# Patient Record
Sex: FEMALE | Race: WHITE | Employment: UNEMPLOYED | URBAN - METROPOLITAN AREA
[De-identification: names, ages, dates, MRNs, and addresses within clinical notes are randomized per-mention and may not be internally consistent; named-entity substitution may affect disease eponyms.]

---

## 2017-02-08 PROBLEM — R00.2 PALPITATIONS: Status: ACTIVE | Noted: 2017-02-08

## 2017-03-21 ENCOUNTER — HOSPITAL ENCOUNTER (OUTPATIENT)
Dept: MAMMOGRAPHY | Age: 52
Discharge: HOME | End: 2017-03-21
Attending: FAMILY MEDICINE
Payer: COMMERCIAL

## 2017-03-21 DIAGNOSIS — Z12.31 VISIT FOR SCREENING MAMMOGRAM: ICD-10-CM

## 2017-03-21 PROCEDURE — 77067 SCR MAMMO BI INCL CAD: CPT

## 2017-08-08 ENCOUNTER — HOSPITAL ENCOUNTER (OUTPATIENT)
Dept: PHYSICAL THERAPY | Age: 52
Discharge: HOME | End: 2017-08-08
Payer: COMMERCIAL

## 2017-08-08 PROCEDURE — 97162 PT EVAL MOD COMPLEX 30 MIN: CPT

## 2017-08-08 NOTE — PROGRESS NOTES
Arvind Ramírez  : [facility] [address]  [phone]   [phone]        OUTPATIENT PHYSICAL THERAPY:Initial Assessment and Daily Note 2017    ICD-10: Treatment Diagnosis: low back pain M54.5  ICD-10: Treatment Diagnosis: muscle spasm of back M62.830    Precautions/Allergies:   Penicillin; Aciphex [rabeprazole]; and Augmentin [amoxicillin-pot clavulanate]   Fall Risk Score: 1 (? 5 = High Risk)  MD Orders: self referred MEDICAL/REFERRING DIAGNOSIS:  Low back pain [M54.5]   DATE OF ONSET: 2017 flare up  REFERRING PHYSICIAN: Referred, Self, MD  RETURN PHYSICIAN APPOINTMENT: as needed     INITIAL ASSESSMENT:  Ms. Tal Brunner is a 49 y.o. female who presents to physical therapy with chronic low back pain with a flare up in 2017. She demonstrates increased soft tissue restrictions along her right side of thoracic and lumbar spine and demonstrates limited ROM through thoracic and lumbar spine flexion, extension and rotation. She demonstrates arthrokinematic dysfunctions throughout her lumbar spine and pelvis, soft tissue restrictions through anterior pelvis/hip right greater than left, decreased activation of core stability and weakness through hips and pelvis. She would benefit from skilled physical therapy to improve overall mobility and function. PROBLEM LIST (Impacting functional limitations):  1. Decreased Strength  2. Decreased ADL/Functional Activities  3. Decreased Ambulation Ability/Technique  4. Increased Pain  5. Decreased Activity Tolerance  6. Decreased Flexibility/Joint Mobility INTERVENTIONS PLANNED:  1. Home Exercise Program (HEP)  2. Manual Therapy  3. Neuromuscular Re-education/Strengthening  4. Range of Motion (ROM)  5. Therapeutic Exercise/Strengthening   TREATMENT PLAN:  Effective Dates: 17 TO 17.   Frequency/Duration: 1 time a week for 4 weeks (patient out of town for 2 weeks)  GOALS: (Goals have been discussed and agreed upon with patient.)    Discharge Goals: Time Frame: 4 weeks  1. Patient demonstrates independence with her HEP without verbal cueing from therapist.  2. Patient able to ambulate for 30 minutes without onset of low back pain. 3. Patient able to perform sit to stand transfers from various surfaces without onset of low back pain. 4. Improve Oswestry outcome measure score from 29/50 to 10/50 to perform ADLs  Rehabilitation Potential For Stated Goals: Excellent  Regarding Richi Haywood Malika's therapy, I certify that the treatment plan above will be carried out by a therapist or under their direction. Thank you for this referral,  Dillon Pittman, MARYAM     Referring Physician Signature: Referred, Self, MD              Date                    The information in this section was collected on 8/8/17 (except where otherwise noted). HISTORY:   History of Present Injury/Illness (Reason for Referral):  Chronic low back pain for years and she has been doing really well up until June 2017. Prior to the flare up she was exercising regularly, cycling and having little to no discomfort. Patient notes increased pain and stiffness with sit to stand transfers. She is challenged with bending and lifting, pain with walking and sitting. Past Medical History/Comorbidities: . Ms. Meng Elias  has a past medical history of Allergic rhinitis, cause unspecified; Esophageal reflux; Essential hypertension, benign; Extrinsic asthma, unspecified; Generalized anxiety disorder; Hypothyroidism; and Unspecified sleep apnea. Ms. Meng Elias  has a past surgical history that includes cholecystectomy (1/07); back surgery; and gyn (12/10). Social History/Living Environment:     Lives in a private home with her , no physical barriers present in home setting  Prior Level of Function/Work/Activity:   Independent, currently challenged with sitting prolonged at work and unable to perform normal workout routine secondary to pain.   Dominant Side:         RIGHT    Current Medications:       Current Outpatient Prescriptions:     lisinopril (PRINIVIL, ZESTRIL) 10 mg tablet, TAKE 1 TABLET BY MOUTH DAILY, Disp: 90 Tab, Rfl: 1    fluticasone-vilanterol (BREO ELLIPTA) 200-25 mcg/dose inhaler, Take 1 Puff by inhalation daily. , Disp: 1 Inhaler, Rfl: 5    montelukast (SINGULAIR) 10 mg tablet, TAKE ONE TABLET BY MOUTH EVERY DAY, Disp: 90 Tab, Rfl: 1    olopatadine (PATANOL) 0.1 % ophthalmic solution, Administer 2 Drops to both eyes two (2) times a day., Disp: 5 mL, Rfl: 5    B.infantis-B.ani-B.long-B.bifi (PROBIOTIC 4X) 10-15 mg TbEC, Take  by mouth., Disp: , Rfl:     omeprazole (PRILOSEC) 40 mg capsule, TAKE ONE CAPSULE BY MOUTH EVERY DAY, Disp: 30 Cap, Rfl: 5    PROAIR RESPICLICK 90 mcg/actuation aepb, INHALE ONE PUFF BY MOUTH EVERY 4 HOURS AS NEEDED, Disp: , Rfl: 5    fexofenadine (ALLEGRA) 180 mg tablet, Take  by mouth., Disp: , Rfl:     fluticasone (FLONASE) 50 mcg/actuation nasal spray, take 1 spray(s) intranasally once a day for 30 day(s), Disp: 1 Bottle, Rfl: 11    magnesium carbonate 54 mg/5 mL liqd, Take  by mouth., Disp: , Rfl:     albuterol (PROVENTIL HFA, VENTOLIN HFA, PROAIR HFA) 90 mcg/actuation inhaler, Take 2 Puffs by inhalation every four (4) hours as needed for Wheezing., Disp: 1 Inhaler, Rfl: 11   Date Last Reviewed:  8/8/2017   Number of Personal Factors/Comorbidities that affect the Plan of Care: 0: LOW COMPLEXITY   EXAMINATION:   Observation/Orthostatic Postural Assessment:          Patient denies any LE pain, paresthesia or symptoms. Patient denies any increase of symptoms with cough, sneeze or valsalva. Patient denies any saddle paresthesia or bowel/bladder deficits. Patient exhibits a decreased lumbar lordosis and slight increased thoracic kyphosis with convex right type I curve thoracic/lumbar spine. Lower extremity weight bearing is symmetrical. Shoulder symmetry exhibits bilateral protraction.  Observation of gait indicates decreased pelvic mobility (patient is a hip walker versus an efficient trunk walker). Lower quadrant bony landmarks are right iliac crest elevated compared to left, level greater trochanter, left greater trochanter anterior compared to right. Leg swing for innominate mobility indicates decreased bilaterally innominate extension. Vertical compression test (VCT) for alignment is 2. Elbow flexion test (EFT) for motor activation is 2. Soft tissue observation indicates restrictions in right iliopsoas, QL, thoracic and lumbar spine paraspinals, and piriformis bilaterally. Palpation: Myofascial assessment indicates restrictions through thoracolumbar fascia and around sacrococcygeal junction. Muscle length testing in modified Victor Manuel position exhibits restricted right psoas and rectus femoris. Hamstring flexibility tested supine with straight leg raise (SLR) is WFL. Piriformis length is restricted right greater than left. Quadriceps flexibility tested prone with heel to buttock is restricted right. Beather Richards test is positive right for rectus femoris tightness. Gastrocnemius and soleus flexibility are WFL. Sacrotuberous ligament is restricted right. Sacrococcygeal junction exhibits right rotated. Body of coccyx is flexed. ROM: Passive trunk rotation is 70% available to the R and 70% available to the L. Kinetic testing in standing including forward bend test is positive left. Marcher's test is positive right. Pelvic shear test is standing exhibits decreased excursion of bilateral shear with a hard end feel. Single plane active movements through lumbar spine in standing exhibit decreased lumbar flexion, extension hinge at L3 and restrictions in right side bending. Lumbar positional testing at transverse processes indicates FRS left L4-5 with limitations in extension, rotation and side bending right. Prone knee active flexion test is positive right.  Spring testing of lumbar spine exhibits decreased a/p mobilization at L4-5 and sacral base. AROM (PROM) Right Left   Hip flexion/Innominate flexion 90 90   Hip extension/Innominate extension 5 5   Hip external rotation (ER) 20 20   Hip internal rotation (IR) 15 20   Strength: Lumbar protective mechanism (LPM) are not tested today for initiation. LPM Strength */5   R anterior to posterior diagonal    L anterior to posterior diagonal    R posterior to anterior diagonal    L posterior to anterior diagonal      Manual Muscle Test (*/5) Right Left   Knee extension 5 5   Knee flexion 5 5   Hip flexion 4 4   Hip ER 4 4   Hip IR 4 4   Hip extension 4- 4-   Hip abduction 3+ 3+   Hip adduction 5 5   Ankle DF 5 5   Ankle PF 5 5   Core stability 3+/5     Special Tests:  Odessia Bang test is negative. Scours test is negative  Neurological Screen:  Myotomes: Key muscle strength testing through bilateral LE is intact. Dermatomes: Sensation testing through bilateral lower quadrants for light touch is intact. Reflexes: Patellar (L4) and Achilles (S1) are not tested today. Neural tension tests: Passive straight leg raise (SLR) test is negative. Slump test is negative. .  Functional Mobility:  Challenged with sit to stand transfers, ambulation and rotation movements through thoracic and lumbar spine. Body Structures Involved:  1. Nerves  2. Joints  3. Muscles Body Functions Affected:  1. Sensory/Pain  2. Neuromusculoskeletal  3. Movement Related Activities and Participation Affected:  1. General Tasks and Demands  2. Mobility  3. Community, Social and [place]   Number of elements (examined above) that affect the Plan of Care: 3: MODERATE COMPLEXITY   CLINICAL PRESENTATION:   Presentation: Evolving clinical presentation with changing clinical characteristics: MODERATE COMPLEXITY   CLINICAL DECISION MAKING:   Outcome Measure:    Tool Used: Modified Oswestry Low Back Pain Questionnaire  Score:  Initial: 29/50  Most Recent: X/50 (Date: -- )   Interpretation of Score: Each section is scored on a 0-5 scale, 5 representing the greatest disability. The scores of each section are added together for a total score of 50. Medical Necessity:   · Patient is expected to demonstrate progress in strength, range of motion, balance and coordination to increase independence with ADLs and ambulation without discomfort. .  Reason for Services/Other Comments:  · Patient continues to require skilled intervention due to increased pain with activity, challenged with daily tasks secondary to discomfort and limited mobility in lumbar spine and pelvis. .   Use of outcome tool(s) and clinical judgement create a POC that gives a: Questionable prediction of patient's progress: MODERATE COMPLEXITY            TREATMENT:   (In addition to Assessment/Re-Assessment sessions the following treatments were rendered)  Pre-treatment Symptoms/Complaints:  Patient notes increased pain over the past two months, is currently being seen by chiropractor. Patient will be out of town for 2 weeks. Pain: Initial: Pain Intensity 1: 8  Pain Location 1: Spine, lumbar  Pain Orientation 1: Right  Post Session:  5/10     Therapeutic Exercise: (5 Minutes):  Exercises per grid below to improve mobility, strength, balance and coordination. Required minimal verbal and manual cues to promote proper body alignment, promote proper body posture and promote proper body mechanics. Progressed resistance, range, repetitions and complexity of movement as indicated. Date:  8/8/2017   Activity/Exercise Parameters   Child pose X 5 reps, 10 sec holds   Abdominal bracing X 10, 5 sec holds                         Manual Therapy (    Soft Tissue Mobilization Duration  Duration: 5 Minutes): Manual techniques to facilitate improved motion and decreased pain.   · Supine anterior right iliopsoas release with FMP of lower trunk rotation  · Prone hip in axis right hip, with FMP and NMR into hip ER/IR  (Used abbreviations: MET - muscle energy technique; PNF - proprioceptive neuromuscular facilitation; NMR - neuromuscular re-education; a/p - anterior to posterior; p/a - posterior to anterior, FMP - functional movement patterns)    Treatment/Session Assessment:    · Response to Treatment:  Improved mobility noted at end of session decreased soft tissue restrictions in right anterior pelvis. .  · Compliance with Program/Exercises: compliant all of the time. · Recommendations/Intent for next treatment session: \"Next visit will focus on advancements to more challenging activities\".   Total Treatment Duration:  PT Patient Time In/Time Out  Time In: 0930  Time Out: [address] [place] Schaumann, PT

## 2017-08-08 NOTE — PROGRESS NOTES
Ambulatory/Rehab Services H2 Model Falls Risk Assessment    Risk Factor Pts. ·   Confusion/Disorientation/Impulsivity  []    4 ·   Symptomatic Depression  []   2 ·   Altered Elimination  []   1 ·   Dizziness/Vertigo  []   1 ·   Gender (Male)  []   1 ·   Any administered antiepileptics (anticonvulsants):  []   2 ·   Any administered benzodiazepines:  []   1 ·   Visual Impairment (specify):  []   1 ·   Portable Oxygen Use  []   1 ·   Orthostatic ? BP  []   1 ·   History of Recent Falls (within 3 mos.)  []   5     Ability to Rise from Chair (choose one) Pts. ·   Ability to rise in a single movement  []   0 ·   Pushes up, successful in one attempt  [x]   1 ·   Multiple attempts, but successful  []   3 ·   Unable to rise without assistance  []   4   Total: (5 or greater = High Risk) 1     Falls Prevention Plan:   []                Physical Limitations to Exercise (specify):   []                Mobility Assistance Device (type):   []                Exercise/Equipment Adaptation (specify):    ©2010 [facility] [address] Patent #[id number].  Federal Law prohibits the replication, distribution or use without written permission from [facility] [employer]

## 2017-08-22 ENCOUNTER — HOSPITAL ENCOUNTER (OUTPATIENT)
Dept: PHYSICAL THERAPY | Age: 52
Discharge: HOME | End: 2017-08-22
Payer: COMMERCIAL

## 2017-08-22 PROCEDURE — 97140 MANUAL THERAPY 1/> REGIONS: CPT

## 2017-08-22 PROCEDURE — 97110 THERAPEUTIC EXERCISES: CPT

## 2017-08-22 NOTE — PROGRESS NOTES
James Marc  : [facility] [address]  [phone]   [phone]        OUTPATIENT PHYSICAL THERAPY:Daily Note 2017    ICD-10: Treatment Diagnosis: low back pain M54.5  ICD-10: Treatment Diagnosis: muscle spasm of back M62.830    Precautions/Allergies:   Penicillin; Aciphex [rabeprazole]; and Augmentin [amoxicillin-pot clavulanate]   Fall Risk Score: 1 (? 5 = High Risk)  MD Orders: self referred MEDICAL/REFERRING DIAGNOSIS:  Low back pain [M54.5]   DATE OF ONSET: 2017 flare up  REFERRING PHYSICIAN: Referred, Self, MD  RETURN PHYSICIAN APPOINTMENT: as needed     INITIAL ASSESSMENT:  Ms. Nasario Hamman is a 49 y.o. female who presents to physical therapy with chronic low back pain with a flare up in 2017. She demonstrates increased soft tissue restrictions along her right side of thoracic and lumbar spine and demonstrates limited ROM through thoracic and lumbar spine flexion, extension and rotation. She demonstrates arthrokinematic dysfunctions throughout her lumbar spine and pelvis, soft tissue restrictions through anterior pelvis/hip right greater than left, decreased activation of core stability and weakness through hips and pelvis. She would benefit from skilled physical therapy to improve overall mobility and function. PROBLEM LIST (Impacting functional limitations):  1. Decreased Strength  2. Decreased ADL/Functional Activities  3. Decreased Ambulation Ability/Technique  4. Increased Pain  5. Decreased Activity Tolerance  6. Decreased Flexibility/Joint Mobility INTERVENTIONS PLANNED:  1. Home Exercise Program (HEP)  2. Manual Therapy  3. Neuromuscular Re-education/Strengthening  4. Range of Motion (ROM)  5. Therapeutic Exercise/Strengthening   TREATMENT PLAN:  Effective Dates: 17 TO 17.   Frequency/Duration: 1 time a week for 4 weeks (patient out of town for 2 weeks)  GOALS: (Goals have been discussed and agreed upon with patient.)    Discharge Goals: Time Frame: 4 weeks  1. Patient demonstrates independence with her HEP without verbal cueing from therapist.  2. Patient able to ambulate for 30 minutes without onset of low back pain. 3. Patient able to perform sit to stand transfers from various surfaces without onset of low back pain. 4. Improve Oswestry outcome measure score from 29/50 to 10/50 to perform ADLs  Rehabilitation Potential For Stated Goals: Excellent  Regarding Bindu Daly's therapy, I certify that the treatment plan above will be carried out by a therapist or under their direction. Thank you for this referral,  Flor Daly PT     Referring Physician Signature: Referred, Self, MD              Date                    The information in this section was collected on 8/8/17 (except where otherwise noted). HISTORY:   History of Present Injury/Illness (Reason for Referral):  Chronic low back pain for years and she has been doing really well up until June 2017. Prior to the flare up she was exercising regularly, cycling and having little to no discomfort. Patient notes increased pain and stiffness with sit to stand transfers. She is challenged with bending and lifting, pain with walking and sitting. Past Medical History/Comorbidities: . Ms. Tyler Portillo  has a past medical history of Allergic rhinitis, cause unspecified; Esophageal reflux; Essential hypertension, benign; Extrinsic asthma, unspecified; Generalized anxiety disorder; Hypothyroidism; and Unspecified sleep apnea. Ms. Tyler Portillo  has a past surgical history that includes cholecystectomy (1/07); back surgery; and gyn (12/10). Social History/Living Environment:     Lives in a private home with her , no physical barriers present in home setting  Prior Level of Function/Work/Activity:   Independent, currently challenged with sitting prolonged at work and unable to perform normal workout routine secondary to pain.   Dominant Side: RIGHT    Current Medications:       Current Outpatient Prescriptions:     lisinopril (PRINIVIL, ZESTRIL) 10 mg tablet, TAKE 1 TABLET BY MOUTH DAILY, Disp: 90 Tab, Rfl: 1    fluticasone-vilanterol (BREO ELLIPTA) 200-25 mcg/dose inhaler, Take 1 Puff by inhalation daily. , Disp: 1 Inhaler, Rfl: 5    montelukast (SINGULAIR) 10 mg tablet, TAKE ONE TABLET BY MOUTH EVERY DAY, Disp: 90 Tab, Rfl: 1    olopatadine (PATANOL) 0.1 % ophthalmic solution, Administer 2 Drops to both eyes two (2) times a day., Disp: 5 mL, Rfl: 5    B.infantis-B.ani-B.long-B.bifi (PROBIOTIC 4X) 10-15 mg TbEC, Take  by mouth., Disp: , Rfl:     omeprazole (PRILOSEC) 40 mg capsule, TAKE ONE CAPSULE BY MOUTH EVERY DAY, Disp: 30 Cap, Rfl: 5    PROAIR RESPICLICK 90 mcg/actuation aepb, INHALE ONE PUFF BY MOUTH EVERY 4 HOURS AS NEEDED, Disp: , Rfl: 5    fexofenadine (ALLEGRA) 180 mg tablet, Take  by mouth., Disp: , Rfl:     fluticasone (FLONASE) 50 mcg/actuation nasal spray, take 1 spray(s) intranasally once a day for 30 day(s), Disp: 1 Bottle, Rfl: 11    magnesium carbonate 54 mg/5 mL liqd, Take  by mouth., Disp: , Rfl:     albuterol (PROVENTIL HFA, VENTOLIN HFA, PROAIR HFA) 90 mcg/actuation inhaler, Take 2 Puffs by inhalation every four (4) hours as needed for Wheezing., Disp: 1 Inhaler, Rfl: 11   Date Last Reviewed:  8/22/2017   Number of Personal Factors/Comorbidities that affect the Plan of Care: 0: LOW COMPLEXITY   EXAMINATION:   Observation/Orthostatic Postural Assessment:          Patient denies any LE pain, paresthesia or symptoms. Patient denies any increase of symptoms with cough, sneeze or valsalva. Patient denies any saddle paresthesia or bowel/bladder deficits. Patient exhibits a decreased lumbar lordosis and slight increased thoracic kyphosis with convex right type I curve thoracic/lumbar spine. Lower extremity weight bearing is symmetrical. Shoulder symmetry exhibits bilateral protraction.  Observation of gait indicates decreased pelvic mobility (patient is a hip walker versus an efficient trunk walker). Lower quadrant bony landmarks are right iliac crest elevated compared to left, level greater trochanter, left greater trochanter anterior compared to right. Leg swing for innominate mobility indicates decreased bilaterally innominate extension. Vertical compression test (VCT) for alignment is 2. Elbow flexion test (EFT) for motor activation is 2. Soft tissue observation indicates restrictions in right iliopsoas, QL, thoracic and lumbar spine paraspinals, and piriformis bilaterally. Palpation: Myofascial assessment indicates restrictions through thoracolumbar fascia and around sacrococcygeal junction. Muscle length testing in modified Victor Manuel position exhibits restricted right psoas and rectus femoris. Hamstring flexibility tested supine with straight leg raise (SLR) is WFL. Piriformis length is restricted right greater than left. Quadriceps flexibility tested prone with heel to buttock is restricted right. Julieanne Mariscal test is positive right for rectus femoris tightness. Gastrocnemius and soleus flexibility are WFL. Sacrotuberous ligament is restricted right. Sacrococcygeal junction exhibits right rotated. Body of coccyx is flexed. ROM: Passive trunk rotation is 70% available to the R and 70% available to the L. Kinetic testing in standing including forward bend test is positive left. Marcher's test is positive right. Pelvic shear test is standing exhibits decreased excursion of bilateral shear with a hard end feel. Single plane active movements through lumbar spine in standing exhibit decreased lumbar flexion, extension hinge at L3 and restrictions in right side bending. Lumbar positional testing at transverse processes indicates FRS left L4-5 with limitations in extension, rotation and side bending right. Prone knee active flexion test is positive right. Spring testing of lumbar spine exhibits decreased a/p mobilization at L4-5 and sacral base. AROM (PROM) Right Left   Hip flexion/Innominate flexion 90 90   Hip extension/Innominate extension 5 5   Hip external rotation (ER) 20 20   Hip internal rotation (IR) 15 20   Strength: Lumbar protective mechanism (LPM) are not tested today for initiation. LPM Strength */5   R anterior to posterior diagonal    L anterior to posterior diagonal    R posterior to anterior diagonal    L posterior to anterior diagonal      Manual Muscle Test (*/5) Right Left   Knee extension 5 5   Knee flexion 5 5   Hip flexion 4 4   Hip ER 4 4   Hip IR 4 4   Hip extension 4- 4-   Hip abduction 3+ 3+   Hip adduction 5 5   Ankle DF 5 5   Ankle PF 5 5   Core stability 3+/5     Special Tests:  Barros Miracle test is negative. Scours test is negative  Neurological Screen:  Myotomes: Key muscle strength testing through bilateral LE is intact. Dermatomes: Sensation testing through bilateral lower quadrants for light touch is intact. Reflexes: Patellar (L4) and Achilles (S1) are not tested today. Neural tension tests: Passive straight leg raise (SLR) test is negative. Slump test is negative. .  Functional Mobility:  Challenged with sit to stand transfers, ambulation and rotation movements through thoracic and lumbar spine. Body Structures Involved:  1. Nerves  2. Joints  3. Muscles Body Functions Affected:  1. Sensory/Pain  2. Neuromusculoskeletal  3. Movement Related Activities and Participation Affected:  1. General Tasks and Demands  2. Mobility  3. Community, Social and [place]   Number of elements (examined above) that affect the Plan of Care: 3: MODERATE COMPLEXITY   CLINICAL PRESENTATION:   Presentation: Evolving clinical presentation with changing clinical characteristics: MODERATE COMPLEXITY   CLINICAL DECISION MAKING:   Outcome Measure:    Tool Used: Modified Oswestry Low Back Pain Questionnaire  Score:  Initial: 29/50  Most Recent: X/50 (Date: -- )   Interpretation of Score: Each section is scored on a 0-5 scale, 5 representing the greatest disability. The scores of each section are added together for a total score of 50. Medical Necessity:   · Patient is expected to demonstrate progress in strength, range of motion, balance and coordination to increase independence with ADLs and ambulation without discomfort. .  Reason for Services/Other Comments:  · Patient continues to require skilled intervention due to increased pain with activity, challenged with daily tasks secondary to discomfort and limited mobility in lumbar spine and pelvis. .   Use of outcome tool(s) and clinical judgement create a POC that gives a: Questionable prediction of patient's progress: MODERATE COMPLEXITY            TREATMENT:   (In addition to Assessment/Re-Assessment sessions the following treatments were rendered)  Pre-treatment Symptoms/Complaints:  Patient notes had myofascial release earlier today and notes improved mobility into her lumbar spine and coccyx. Continues to have right anterior hip impingement with sitting   Pain: Initial: Pain Intensity 1: 5  Pain Location 1: Spine, lumbar  Pain Orientation 1: Right  Post Session:  3/10     Therapeutic Exercise: (15 Minutes):  Exercises per grid below to improve mobility, strength, balance and coordination. Required minimal verbal and manual cues to promote proper body alignment, promote proper body posture and promote proper body mechanics. Progressed resistance, range, repetitions and complexity of movement as indicated. Date:  8/22/2017   Activity/Exercise Parameters   Child pose X 5 reps, 10 sec holds   Abdominal bracing X 10, 5 sec holds   Postural education X 10 minute review of sitting positions in neutral pelvis, hip hinge, weight bearing through LE, awareness of extension through thoracic spine and sternal elevation with her normal posture.    Quadriped arch/sag X 5 reps, 10 sec holds                 Manual Therapy (    Soft Tissue Mobilization Duration  Duration: 45 Minutes): Manual techniques to facilitate improved motion and decreased pain. · Supine anterior right iliopsoas release with FMP of lower trunk rotation  · Supine right cecum soft tissue mobilization   · Prone hip in axis right hip, with FMP and NMR into hip ER/IR  · Prone soft tissue mobilization around bony contours of iliac crest and sacral sulcus  · Side lying left for right QL soft tissue mobilization with pelvic PNF patterns of anterior elevation and posterior depression. (Used abbreviations: MET - muscle energy technique; PNF - proprioceptive neuromuscular facilitation; NMR - neuromuscular re-education; a/p - anterior to posterior; p/a - posterior to anterior, FMP - functional movement patterns)    Treatment/Session Assessment:    · Response to Treatment:  Decreased soft tissue restrictions noted in pelvis and right anterior hip at end of session. · Compliance with Program/Exercises: compliant all of the time. · Recommendations/Intent for next treatment session: \"Next visit will focus on advancements to more challenging activities\".   Total Treatment Duration:  PT Patient Time In/Time Out  Time In: 1330  Time Out: [address] [place] [address] Patra Sandhoff, PT

## 2017-08-30 ENCOUNTER — HOSPITAL ENCOUNTER (OUTPATIENT)
Dept: PHYSICAL THERAPY | Age: 52
Discharge: HOME | End: 2017-08-30
Payer: COMMERCIAL

## 2017-08-30 PROCEDURE — 97140 MANUAL THERAPY 1/> REGIONS: CPT

## 2017-08-30 PROCEDURE — 97110 THERAPEUTIC EXERCISES: CPT

## 2017-08-30 NOTE — PROGRESS NOTES
Vito Frances  : [facility] [address]  [phone]   [phone]        OUTPATIENT PHYSICAL THERAPY:Daily Note 2017    ICD-10: Treatment Diagnosis: low back pain M54.5  ICD-10: Treatment Diagnosis: muscle spasm of back M62.830    Precautions/Allergies:   Penicillin; Aciphex [rabeprazole]; and Augmentin [amoxicillin-pot clavulanate]   Fall Risk Score: 1 (? 5 = High Risk)  MD Orders: self referred MEDICAL/REFERRING DIAGNOSIS:  Low back pain [M54.5]   DATE OF ONSET: 2017 flare up  REFERRING PHYSICIAN: Referred, Self, MD  RETURN PHYSICIAN APPOINTMENT: as needed     INITIAL ASSESSMENT:  Ms. Moise Armas is a 47 y.o. female who presents to physical therapy with chronic low back pain with a flare up in 2017. She demonstrates increased soft tissue restrictions along her right side of thoracic and lumbar spine and demonstrates limited ROM through thoracic and lumbar spine flexion, extension and rotation. She demonstrates arthrokinematic dysfunctions throughout her lumbar spine and pelvis, soft tissue restrictions through anterior pelvis/hip right greater than left, decreased activation of core stability and weakness through hips and pelvis. She would benefit from skilled physical therapy to improve overall mobility and function. PROBLEM LIST (Impacting functional limitations):  1. Decreased Strength  2. Decreased ADL/Functional Activities  3. Decreased Ambulation Ability/Technique  4. Increased Pain  5. Decreased Activity Tolerance  6. Decreased Flexibility/Joint Mobility INTERVENTIONS PLANNED:  1. Home Exercise Program (HEP)  2. Manual Therapy  3. Neuromuscular Re-education/Strengthening  4. Range of Motion (ROM)  5. Therapeutic Exercise/Strengthening   TREATMENT PLAN:  Effective Dates: 17 TO 17.   Frequency/Duration: 1 time a week for 4 weeks (patient out of town for 2 weeks)  GOALS: (Goals have been discussed and agreed upon with patient.)    Discharge Goals: Time Frame: 4 weeks  1. Patient demonstrates independence with her HEP without verbal cueing from therapist.  2. Patient able to ambulate for 30 minutes without onset of low back pain. 3. Patient able to perform sit to stand transfers from various surfaces without onset of low back pain. 4. Improve Oswestry outcome measure score from 29/50 to 10/50 to perform ADLs  Rehabilitation Potential For Stated Goals: Excellent  Regarding Maria Ines Daly's therapy, I certify that the treatment plan above will be carried out by a therapist or under their direction. Thank you for this referral,  An Mars PT     Referring Physician Signature: Referred, Self, MD              Date                    The information in this section was collected on 8/8/17 (except where otherwise noted). HISTORY:   History of Present Injury/Illness (Reason for Referral):  Chronic low back pain for years and she has been doing really well up until June 2017. Prior to the flare up she was exercising regularly, cycling and having little to no discomfort. Patient notes increased pain and stiffness with sit to stand transfers. She is challenged with bending and lifting, pain with walking and sitting. Past Medical History/Comorbidities: . Ms. Vince Kulkarni  has a past medical history of Allergic rhinitis, cause unspecified; Esophageal reflux; Essential hypertension, benign; Extrinsic asthma, unspecified; Generalized anxiety disorder; Hypothyroidism; and Unspecified sleep apnea. Ms. Vince Kulkarni  has a past surgical history that includes cholecystectomy (1/07); back surgery; and gyn (12/10). Social History/Living Environment:     Lives in a private home with her , no physical barriers present in home setting  Prior Level of Function/Work/Activity:   Independent, currently challenged with sitting prolonged at work and unable to perform normal workout routine secondary to pain.   Dominant Side: RIGHT    Current Medications:       Current Outpatient Prescriptions:     lisinopril (PRINIVIL, ZESTRIL) 10 mg tablet, TAKE 1 TABLET BY MOUTH DAILY, Disp: 90 Tab, Rfl: 1    fluticasone-vilanterol (BREO ELLIPTA) 200-25 mcg/dose inhaler, Take 1 Puff by inhalation daily. , Disp: 1 Inhaler, Rfl: 5    montelukast (SINGULAIR) 10 mg tablet, TAKE ONE TABLET BY MOUTH EVERY DAY, Disp: 90 Tab, Rfl: 1    olopatadine (PATANOL) 0.1 % ophthalmic solution, Administer 2 Drops to both eyes two (2) times a day., Disp: 5 mL, Rfl: 5    B.infantis-B.ani-B.long-B.bifi (PROBIOTIC 4X) 10-15 mg TbEC, Take  by mouth., Disp: , Rfl:     omeprazole (PRILOSEC) 40 mg capsule, TAKE ONE CAPSULE BY MOUTH EVERY DAY, Disp: 30 Cap, Rfl: 5    PROAIR RESPICLICK 90 mcg/actuation aepb, INHALE ONE PUFF BY MOUTH EVERY 4 HOURS AS NEEDED, Disp: , Rfl: 5    fexofenadine (ALLEGRA) 180 mg tablet, Take  by mouth., Disp: , Rfl:     fluticasone (FLONASE) 50 mcg/actuation nasal spray, take 1 spray(s) intranasally once a day for 30 day(s), Disp: 1 Bottle, Rfl: 11    magnesium carbonate 54 mg/5 mL liqd, Take  by mouth., Disp: , Rfl:     albuterol (PROVENTIL HFA, VENTOLIN HFA, PROAIR HFA) 90 mcg/actuation inhaler, Take 2 Puffs by inhalation every four (4) hours as needed for Wheezing., Disp: 1 Inhaler, Rfl: 11   Date Last Reviewed:  8/30/2017   Number of Personal Factors/Comorbidities that affect the Plan of Care: 0: LOW COMPLEXITY   EXAMINATION:   Observation/Orthostatic Postural Assessment:          Patient denies any LE pain, paresthesia or symptoms. Patient denies any increase of symptoms with cough, sneeze or valsalva. Patient denies any saddle paresthesia or bowel/bladder deficits. Patient exhibits a decreased lumbar lordosis and slight increased thoracic kyphosis with convex right type I curve thoracic/lumbar spine. Lower extremity weight bearing is symmetrical. Shoulder symmetry exhibits bilateral protraction.  Observation of gait indicates decreased pelvic mobility (patient is a hip walker versus an efficient trunk walker). Lower quadrant bony landmarks are right iliac crest elevated compared to left, level greater trochanter, left greater trochanter anterior compared to right. Leg swing for innominate mobility indicates decreased bilaterally innominate extension. Vertical compression test (VCT) for alignment is 2. Elbow flexion test (EFT) for motor activation is 2. Soft tissue observation indicates restrictions in right iliopsoas, QL, thoracic and lumbar spine paraspinals, and piriformis bilaterally. Palpation: Myofascial assessment indicates restrictions through thoracolumbar fascia and around sacrococcygeal junction. Muscle length testing in modified Victor Manuel position exhibits restricted right psoas and rectus femoris. Hamstring flexibility tested supine with straight leg raise (SLR) is WFL. Piriformis length is restricted right greater than left. Quadriceps flexibility tested prone with heel to buttock is restricted right. Reina Garden test is positive right for rectus femoris tightness. Gastrocnemius and soleus flexibility are WFL. Sacrotuberous ligament is restricted right. Sacrococcygeal junction exhibits right rotated. Body of coccyx is flexed. ROM: Passive trunk rotation is 70% available to the R and 70% available to the L. Kinetic testing in standing including forward bend test is positive left. Marcher's test is positive right. Pelvic shear test is standing exhibits decreased excursion of bilateral shear with a hard end feel. Single plane active movements through lumbar spine in standing exhibit decreased lumbar flexion, extension hinge at L3 and restrictions in right side bending. Lumbar positional testing at transverse processes indicates FRS left L4-5 with limitations in extension, rotation and side bending right. Prone knee active flexion test is positive right. Spring testing of lumbar spine exhibits decreased a/p mobilization at L4-5 and sacral base. AROM (PROM) Right Left   Hip flexion/Innominate flexion 90 90   Hip extension/Innominate extension 5 5   Hip external rotation (ER) 20 20   Hip internal rotation (IR) 15 20   Strength: Lumbar protective mechanism (LPM) are not tested today for initiation. LPM Strength */5   R anterior to posterior diagonal    L anterior to posterior diagonal    R posterior to anterior diagonal    L posterior to anterior diagonal      Manual Muscle Test (*/5) Right Left   Knee extension 5 5   Knee flexion 5 5   Hip flexion 4 4   Hip ER 4 4   Hip IR 4 4   Hip extension 4- 4-   Hip abduction 3+ 3+   Hip adduction 5 5   Ankle DF 5 5   Ankle PF 5 5   Core stability 3+/5     Special Tests:  Francesco Josy test is negative. Scours test is negative  Neurological Screen:  Myotomes: Key muscle strength testing through bilateral LE is intact. Dermatomes: Sensation testing through bilateral lower quadrants for light touch is intact. Reflexes: Patellar (L4) and Achilles (S1) are not tested today. Neural tension tests: Passive straight leg raise (SLR) test is negative. Slump test is negative. .  Functional Mobility:  Challenged with sit to stand transfers, ambulation and rotation movements through thoracic and lumbar spine. Body Structures Involved:  1. Nerves  2. Joints  3. Muscles Body Functions Affected:  1. Sensory/Pain  2. Neuromusculoskeletal  3. Movement Related Activities and Participation Affected:  1. General Tasks and Demands  2. Mobility  3. Community, Social and [place]   Number of elements (examined above) that affect the Plan of Care: 3: MODERATE COMPLEXITY   CLINICAL PRESENTATION:   Presentation: Evolving clinical presentation with changing clinical characteristics: MODERATE COMPLEXITY   CLINICAL DECISION MAKING:   Outcome Measure:    Tool Used: Modified Oswestry Low Back Pain Questionnaire  Score:  Initial: 29/50  Most Recent: X/50 (Date: -- )   Interpretation of Score: Each section is scored on a 0-5 scale, 5 representing the greatest disability. The scores of each section are added together for a total score of 50. Medical Necessity:   · Patient is expected to demonstrate progress in strength, range of motion, balance and coordination to increase independence with ADLs and ambulation without discomfort. .  Reason for Services/Other Comments:  · Patient continues to require skilled intervention due to increased pain with activity, challenged with daily tasks secondary to discomfort and limited mobility in lumbar spine and pelvis. .   Use of outcome tool(s) and clinical judgement create a POC that gives a: Questionable prediction of patient's progress: MODERATE COMPLEXITY            TREATMENT:   (In addition to Assessment/Re-Assessment sessions the following treatments were rendered)  Pre-treatment Symptoms/Complaints:  Patient notes improved mobility after her [address] appointment yesterday, noting more awareness of her posture in sitting and standing. Pain: Initial: Pain Intensity 1: 4  Pain Location 1: Hip, Spine, lumbar  Pain Orientation 1: Right  Post Session:  3/10     Therapeutic Exercise: (25 Minutes):  Exercises per grid below to improve mobility, strength, balance and coordination. Required minimal verbal and manual cues to promote proper body alignment, promote proper body posture and promote proper body mechanics. Progressed resistance, range, repetitions and complexity of movement as indicated. Date:  8/30/2017   Activity/Exercise Parameters   Child pose X 5 reps, 10 sec holds   Abdominal bracing X 10, 5 sec holds  X 10 reps UE/LE, 5 sec holds with distraction   Postural education X 10 minute review of sitting positions in neutral pelvis, hip hinge, weight bearing through LE, awareness of extension through thoracic spine and sternal elevation with her normal posture.   Review of standing positions of thoracic block to pelvic block with recognition of her thoracic spine sitting in extension in her \"normal\" position. Quadriped arch/sag X 5 reps, 10 sec holds   Lower trunk rotation multifidi activation X 5 minute education of core stability and techniques  X 5 reps, slow and controlled bilaterally. Manual Therapy (    Soft Tissue Mobilization Duration  Duration: 35 Minutes): Manual techniques to facilitate improved motion and decreased pain. · Supine anterior right iliopsoas release with FMP of lower trunk rotation  · Supine right cecum soft tissue mobilization   · Prone hip in axis right hip, with FMP and NMR into hip ER/IR  · Prone left sacral mobilization p/a with FMP of left hip ER/IR followed with NMR  · Prone soft tissue mobilization around bony contours of iliac crest and sacral sulcus  · Side lying left for right QL soft tissue mobilization with pelvic PNF patterns of anterior elevation and posterior depression. (Used abbreviations: MET - muscle energy technique; PNF - proprioceptive neuromuscular facilitation; NMR - neuromuscular re-education; a/p - anterior to posterior; p/a - posterior to anterior, FMP - functional movement patterns)    Treatment/Session Assessment:    · Response to Treatment:  Improved overall mobility in lumbar spine and pelvic position at end of session, improving awareness of her overall postural positions. · Compliance with Program/Exercises: compliant all of the time. · Recommendations/Intent for next treatment session: \"Next visit will focus on advancements to more challenging activities\".   Total Treatment Duration:  PT Patient Time In/Time Out  Time In: 0935  Time Out: 1500 [facility] Ashley Castillo, MARYAM

## 2017-09-05 ENCOUNTER — HOSPITAL ENCOUNTER (OUTPATIENT)
Dept: PHYSICAL THERAPY | Age: 52
Discharge: HOME | End: 2017-09-05
Payer: COMMERCIAL

## 2017-09-05 PROCEDURE — 97110 THERAPEUTIC EXERCISES: CPT

## 2017-09-05 PROCEDURE — 97140 MANUAL THERAPY 1/> REGIONS: CPT

## 2017-09-05 NOTE — PROGRESS NOTES
Jorge Body  : [facility] [address]  [phone]   [phone]        OUTPATIENT PHYSICAL THERAPY:Daily Note and Recertification [id number]    ICD-10: Treatment Diagnosis: low back pain M54.5  ICD-10: Treatment Diagnosis: muscle spasm of back M62.830    Precautions/Allergies:   Penicillin; Aciphex [rabeprazole]; and Augmentin [amoxicillin-pot clavulanate]   Fall Risk Score: 1 (? 5 = High Risk)  MD Orders: self referred MEDICAL/REFERRING DIAGNOSIS:  Low back pain [M54.5]   DATE OF ONSET: 2017 flare up  REFERRING PHYSICIAN: Steffi Hernandez 89: as needed     INITIAL ASSESSMENT:  Ms. Everett Morris is a 47 y.o. female who presents to physical therapy with chronic low back pain with a flare up in 2017. She demonstrates increased soft tissue restrictions along her right side of thoracic and lumbar spine and demonstrates limited ROM through thoracic and lumbar spine flexion, extension and rotation. She demonstrates arthrokinematic dysfunctions throughout her lumbar spine and pelvis, soft tissue restrictions through anterior pelvis/hip right greater than left, decreased activation of core stability and weakness through hips and pelvis. She would benefit from skilled physical therapy to improve overall mobility and function. 17: Patient has attended 4 physical therapy sessions, she has demonstrated improved mobility through pelvis and lumbar spine, however, continues to demonstrate joint and soft tissue restrictions, strength deficits and postural deficits. She would benefit from continued physical therapy to improve overall mobility and function with daily activities. PROBLEM LIST (Impacting functional limitations):  1. Decreased Strength  2. Decreased ADL/Functional Activities  3. Decreased Ambulation Ability/Technique  4. Increased Pain  5. Decreased Activity Tolerance  6.  Decreased Flexibility/Joint Mobility INTERVENTIONS PLANNED:  1. Home Exercise Program (HEP)  2. Manual Therapy  3. Neuromuscular Re-education/Strengthening  4. Range of Motion (ROM)  5. Therapeutic Exercise/Strengthening   TREATMENT PLAN:  Effective Dates:  9/5/17 TO 11/28/17. Frequency/Duration: 2x a week for 6 weeks and progress to 1x a week for 6 weeks. (Patient may only be seen 1x a week secondary to therapist availability.)  GOALS: (Goals have been discussed and agreed upon with patient.)    Discharge Goals: Time Frame: 12 weeks  1. Patient demonstrates independence with her HEP without verbal cueing from therapist. - GOAL MET  2. Patient able to ambulate for 30 minutes without onset of low back pain. - ONGOING  3. Patient able to perform sit to stand transfers from various surfaces without onset of low back pain. - ALMOST MET  4. Improve Oswestry outcome measure score from 29/50 to 10/50 to perform ADLs - ONGOING  Rehabilitation Potential For Stated Goals: Excellent  Regarding Do Daly's therapy, I certify that the treatment plan above will be carried out by a therapist or under their direction. Thank you for this referral,  Tee Muñiz, PT     Referring Physician Signature: Anuj Avery, *              Date                    The information in this section was collected on 8/8/17 (except where otherwise noted). HISTORY:   History of Present Injury/Illness (Reason for Referral):  Chronic low back pain for years and she has been doing really well up until June 2017. Prior to the flare up she was exercising regularly, cycling and having little to no discomfort. Patient notes increased pain and stiffness with sit to stand transfers. She is challenged with bending and lifting, pain with walking and sitting. Past Medical History/Comorbidities: . Ms. Nasario Hamman  has a past medical history of Allergic rhinitis, cause unspecified; Esophageal reflux; Essential hypertension, benign;  Extrinsic asthma, unspecified; Generalized anxiety disorder; Hypothyroidism; and Unspecified sleep apnea. Ms. Margareth Blanco  has a past surgical history that includes cholecystectomy (1/07); back surgery; and gyn (12/10). Social History/Living Environment:     Lives in a private home with her , no physical barriers present in home setting  Prior Level of Function/Work/Activity:   Independent, currently challenged with sitting prolonged at work and unable to perform normal workout routine secondary to pain. Dominant Side:         RIGHT    Current Medications:       Current Outpatient Prescriptions:     lisinopril (PRINIVIL, ZESTRIL) 10 mg tablet, TAKE 1 TABLET BY MOUTH DAILY, Disp: 90 Tab, Rfl: 1    fluticasone-vilanterol (BREO ELLIPTA) 200-25 mcg/dose inhaler, Take 1 Puff by inhalation daily. , Disp: 1 Inhaler, Rfl: 5    montelukast (SINGULAIR) 10 mg tablet, TAKE ONE TABLET BY MOUTH EVERY DAY, Disp: 90 Tab, Rfl: 1    olopatadine (PATANOL) 0.1 % ophthalmic solution, Administer 2 Drops to both eyes two (2) times a day., Disp: 5 mL, Rfl: 5    B.infantis-B.ani-B.long-B.bifi (PROBIOTIC 4X) 10-15 mg TbEC, Take  by mouth., Disp: , Rfl:     omeprazole (PRILOSEC) 40 mg capsule, TAKE ONE CAPSULE BY MOUTH EVERY DAY, Disp: 30 Cap, Rfl: 5    PROAIR RESPICLICK 90 mcg/actuation aepb, INHALE ONE PUFF BY MOUTH EVERY 4 HOURS AS NEEDED, Disp: , Rfl: 5    fexofenadine (ALLEGRA) 180 mg tablet, Take  by mouth., Disp: , Rfl:     fluticasone (FLONASE) 50 mcg/actuation nasal spray, take 1 spray(s) intranasally once a day for 30 day(s), Disp: 1 Bottle, Rfl: 11    magnesium carbonate 54 mg/5 mL liqd, Take  by mouth., Disp: , Rfl:     albuterol (PROVENTIL HFA, VENTOLIN HFA, PROAIR HFA) 90 mcg/actuation inhaler, Take 2 Puffs by inhalation every four (4) hours as needed for Wheezing., Disp: 1 Inhaler, Rfl: 11   Date Last Reviewed:  9/5/2017   Number of Personal Factors/Comorbidities that affect the Plan of Care: 0: LOW COMPLEXITY   EXAMINATION: Observation/Orthostatic Postural Assessment:          Patient denies any LE pain, paresthesia or symptoms. Patient denies any increase of symptoms with cough, sneeze or valsalva. Patient denies any saddle paresthesia or bowel/bladder deficits. Patient exhibits a decreased lumbar lordosis and slight increased thoracic kyphosis with convex right type I curve thoracic/lumbar spine. Lower extremity weight bearing is symmetrical. Shoulder symmetry exhibits bilateral protraction. Observation of gait indicates decreased pelvic mobility (patient is a hip walker versus an efficient trunk walker). Lower quadrant bony landmarks are right iliac crest elevated compared to left, level greater trochanter, left greater trochanter anterior compared to right. Leg swing for innominate mobility indicates decreased bilaterally innominate extension. Vertical compression test (VCT) for alignment is 3. Elbow flexion test (EFT) for motor activation is 3. Soft tissue observation indicates restrictions in right iliopsoas, QL, thoracic and lumbar spine paraspinals, and piriformis bilaterally. Palpation: Myofascial assessment indicates restrictions through thoracolumbar fascia and around sacrococcygeal junction. improving Muscle length testing in modified Victor Manuel position exhibits restricted right psoas and rectus femoris  improving. Hamstring flexibility tested supine with straight leg raise (SLR) is WFL. Piriformis length is restricted right greater than left. improving Quadriceps flexibility tested prone with heel to buttock is restricted right. Broderick Sorrow test is positive right for rectus femoris tightness. Gastrocnemius and soleus flexibility are WFL. Sacrotuberous ligament is restricted right. Sacrococcygeal junction exhibits right rotated. Body of coccyx is flexed. ROM: Passive trunk rotation is 75% available to the R and 80% available to the L. Kinetic testing in standing including forward bend test is positive left.  Marcher's test is positive right. Pelvic shear test is standing exhibits decreased excursion of bilateral shear with a hard end feel. Single plane active movements through lumbar spine in standing exhibit decreased lumbar flexion, extension hinge at L3 and restrictions in right side bending. Lumbar positional testing at transverse processes indicates FRS left L4-5 with limitations in extension, rotation and side bending right improving. Prone knee active flexion test is positive right. Spring testing of lumbar spine exhibits decreased a/p mobilization at L4-5 and sacral base. improving  AROM (PROM) Right Left   Hip flexion/Innominate flexion 90 90   Hip extension/Innominate extension 5 5   Hip external rotation (ER) 20 20   Hip internal rotation (IR) 15 20   Strength: Lumbar protective mechanism (LPM) are not tested today for initiation. LPM Strength */5   R anterior to posterior diagonal 2   L anterior to posterior diagonal 2   R posterior to anterior diagonal 3   L posterior to anterior diagonal 3     Manual Muscle Test (*/5) Right Left   Knee extension 5 5   Knee flexion 5 5   Hip flexion 4 4   Hip ER 4 4   Hip IR 4 4   Hip extension 4- 4-   Hip abduction 3+ 3+   Hip adduction 5 5   Ankle DF 5 5   Ankle PF 5 5   Core stability 3+/5     Special Tests:  Nathaniel Bulla test is negative. Scours test is negative  Neurological Screen:  Myotomes: Key muscle strength testing through bilateral LE is intact. Dermatomes: Sensation testing through bilateral lower quadrants for light touch is intact. Reflexes: Patellar (L4) and Achilles (S1) are not tested today. Neural tension tests: Passive straight leg raise (SLR) test is negative. Slump test is negative. .  Functional Mobility:  Challenged with sit to stand transfers, ambulation and rotation movements through thoracic and lumbar spine. Body Structures Involved:  1. Nerves  2. Joints  3. Muscles Body Functions Affected:  1. Sensory/Pain  2. Neuromusculoskeletal  3.  Movement Related Activities and Participation Affected:  1. General Tasks and Demands  2. Mobility  3. Community, Social and [place]   Number of elements (examined above) that affect the Plan of Care: 3: MODERATE COMPLEXITY   CLINICAL PRESENTATION:   Presentation: Evolving clinical presentation with changing clinical characteristics: MODERATE COMPLEXITY   CLINICAL DECISION MAKING:   Outcome Measure: Tool Used: Modified Oswestry Low Back Pain Questionnaire  Score:  Initial: 29/50  Most Recent: 25/50 (Date: 9-5-17 )   Interpretation of Score: Each section is scored on a 0-5 scale, 5 representing the greatest disability. The scores of each section are added together for a total score of 50. Medical Necessity:   · Patient is expected to demonstrate progress in strength, range of motion, balance and coordination to increase independence with ADLs and ambulation without discomfort. .  Reason for Services/Other Comments:  · Patient continues to require skilled intervention due to increased pain with activity, challenged with daily tasks secondary to discomfort and limited mobility in lumbar spine and pelvis. .   Use of outcome tool(s) and clinical judgement create a POC that gives a: Questionable prediction of patient's progress: MODERATE COMPLEXITY            TREATMENT:   (In addition to Assessment/Re-Assessment sessions the following treatments were rendered)  Pre-treatment Symptoms/Complaints:  Patient notes improvements each day, also more aware of her posture and upright positions, able to self correct, however noting more discomfort into legs secondary to new positions. Pain: Initial: Pain Intensity 1: 4  Pain Location 1: Hip, Spine, lumbar  Pain Orientation 1: Right  Post Session:  3/10     Therapeutic Exercise: (15 Minutes):  Exercises per grid below to improve mobility, strength, balance and coordination.   Required minimal verbal and manual cues to promote proper body alignment, promote proper body posture and promote proper body mechanics. Progressed resistance, range, repetitions and complexity of movement as indicated. Date:  9/5/2017   Activity/Exercise Parameters   Child pose X 5 reps, 10 sec holds   Abdominal bracing X 10, 5 sec holds  X 10 reps UE/LE, 5 sec holds with distraction   Postural education X 5 minutes  Review of standing positions of thoracic block to pelvic block with recognition of her thoracic spine sitting in extension in her \"normal\" position. Quadriped arch/sag X 5 reps, 10 sec holds   Lower trunk rotation multifidi activation X 5 minute education of core stability and techniques  X 5 reps, slow and controlled bilaterally. Manual Therapy (    Soft Tissue Mobilization Duration  Duration: 45 Minutes): Manual techniques to facilitate improved motion and decreased pain. · Supine anterior right iliopsoas release with FMP of lower trunk rotation  · Supine right cecum soft tissue mobilization   · Prone hip in axis right hip, with FMP and NMR into hip ER/IR  · Prone left sacral mobilization p/a with FMP of left hip ER/IR followed with NMR  · Prone soft tissue mobilization around bony contours of iliac crest and sacral sulcus  · Side lying left for right QL soft tissue mobilization with pelvic PNF patterns of anterior elevation and posterior depression. (Used abbreviations: MET - muscle energy technique; PNF - proprioceptive neuromuscular facilitation; NMR - neuromuscular re-education; a/p - anterior to posterior; p/a - posterior to anterior, FMP - functional movement patterns)    Treatment/Session Assessment:    · Response to Treatment: continues to demonstrate improved postural awareness in sitting and standing. · Compliance with Program/Exercises: compliant all of the time. · Recommendations/Intent for next treatment session: \"Next visit will focus on advancements to more challenging activities\".   Total Treatment Duration:  PT Patient Time In/Time Out  Time In: 0830  Time Out: One [facility] Bal Hartmann, PT

## 2017-09-07 ENCOUNTER — HOSPITAL ENCOUNTER (OUTPATIENT)
Dept: PHYSICAL THERAPY | Age: 52
Discharge: HOME | End: 2017-09-07
Payer: COMMERCIAL

## 2017-09-07 PROCEDURE — 97140 MANUAL THERAPY 1/> REGIONS: CPT

## 2017-09-07 PROCEDURE — 97110 THERAPEUTIC EXERCISES: CPT

## 2017-09-08 NOTE — PROGRESS NOTES
Anayeli Chi  : [facility] [address]  [phone]   [phone]        OUTPATIENT PHYSICAL THERAPY:Daily Note 2017    ICD-10: Treatment Diagnosis: low back pain M54.5  ICD-10: Treatment Diagnosis: muscle spasm of back M62.830    Precautions/Allergies:   Penicillin; Aciphex [rabeprazole]; and Augmentin [amoxicillin-pot clavulanate]   Fall Risk Score: 1 (? 5 = High Risk)  MD Orders: self referred MEDICAL/REFERRING DIAGNOSIS:  Low back pain [M54.5]   DATE OF ONSET: 2017 flare up  REFERRING PHYSICIAN: Steffi Hernandez 89: as needed     INITIAL ASSESSMENT:  Ms. Deacon Diaz is a 47 y.o. female who presents to physical therapy with chronic low back pain with a flare up in 2017. She demonstrates increased soft tissue restrictions along her right side of thoracic and lumbar spine and demonstrates limited ROM through thoracic and lumbar spine flexion, extension and rotation. She demonstrates arthrokinematic dysfunctions throughout her lumbar spine and pelvis, soft tissue restrictions through anterior pelvis/hip right greater than left, decreased activation of core stability and weakness through hips and pelvis. She would benefit from skilled physical therapy to improve overall mobility and function. 17: Patient has attended 4 physical therapy sessions, she has demonstrated improved mobility through pelvis and lumbar spine, however, continues to demonstrate joint and soft tissue restrictions, strength deficits and postural deficits. She would benefit from continued physical therapy to improve overall mobility and function with daily activities. PROBLEM LIST (Impacting functional limitations):  1. Decreased Strength  2. Decreased ADL/Functional Activities  3. Decreased Ambulation Ability/Technique  4. Increased Pain  5. Decreased Activity Tolerance  6.  Decreased Flexibility/Joint Mobility INTERVENTIONS PLANNED:  1. Home Exercise Program (HEP)  2. Manual Therapy  3. Neuromuscular Re-education/Strengthening  4. Range of Motion (ROM)  5. Therapeutic Exercise/Strengthening   TREATMENT PLAN:  Effective Dates:  9/5/17 TO 11/28/17. Frequency/Duration: 2x a week for 6 weeks and progress to 1x a week for 6 weeks. (Patient may only be seen 1x a week secondary to therapist availability.)  GOALS: (Goals have been discussed and agreed upon with patient.)    Discharge Goals: Time Frame: 12 weeks  1. Patient demonstrates independence with her HEP without verbal cueing from therapist. - GOAL MET  2. Patient able to ambulate for 30 minutes without onset of low back pain. - ONGOING  3. Patient able to perform sit to stand transfers from various surfaces without onset of low back pain. - ALMOST MET  4. Improve Oswestry outcome measure score from 29/50 to 10/50 to perform ADLs - ONGOING  Rehabilitation Potential For Stated Goals: Excellent  Regarding Judith aDly's therapy, I certify that the treatment plan above will be carried out by a therapist or under their direction. Thank you for this referral,  Cal Vallejo, PT     Referring Physician Signature: Levi Mcgraw, *              Date                    The information in this section was collected on 8/8/17 (except where otherwise noted). HISTORY:   History of Present Injury/Illness (Reason for Referral):  Chronic low back pain for years and she has been doing really well up until June 2017. Prior to the flare up she was exercising regularly, cycling and having little to no discomfort. Patient notes increased pain and stiffness with sit to stand transfers. She is challenged with bending and lifting, pain with walking and sitting. Past Medical History/Comorbidities: . Ms. Angie Pittman  has a past medical history of Allergic rhinitis, cause unspecified; Esophageal reflux; Essential hypertension, benign;  Extrinsic asthma, unspecified; Generalized anxiety disorder; Hypothyroidism; and Unspecified sleep apnea. Ms. Aislinn Higginbotham  has a past surgical history that includes cholecystectomy (1/07); back surgery; and gyn (12/10). Social History/Living Environment:     Lives in a private home with her , no physical barriers present in home setting  Prior Level of Function/Work/Activity:   Independent, currently challenged with sitting prolonged at work and unable to perform normal workout routine secondary to pain. Dominant Side:         RIGHT    Current Medications:       Current Outpatient Prescriptions:     lisinopril (PRINIVIL, ZESTRIL) 10 mg tablet, TAKE 1 TABLET BY MOUTH DAILY, Disp: 90 Tab, Rfl: 1    fluticasone-vilanterol (BREO ELLIPTA) 200-25 mcg/dose inhaler, Take 1 Puff by inhalation daily. , Disp: 1 Inhaler, Rfl: 5    montelukast (SINGULAIR) 10 mg tablet, TAKE ONE TABLET BY MOUTH EVERY DAY, Disp: 90 Tab, Rfl: 1    olopatadine (PATANOL) 0.1 % ophthalmic solution, Administer 2 Drops to both eyes two (2) times a day., Disp: 5 mL, Rfl: 5    B.infantis-B.ani-B.long-B.bifi (PROBIOTIC 4X) 10-15 mg TbEC, Take  by mouth., Disp: , Rfl:     omeprazole (PRILOSEC) 40 mg capsule, TAKE ONE CAPSULE BY MOUTH EVERY DAY, Disp: 30 Cap, Rfl: 5    PROAIR RESPICLICK 90 mcg/actuation aepb, INHALE ONE PUFF BY MOUTH EVERY 4 HOURS AS NEEDED, Disp: , Rfl: 5    fexofenadine (ALLEGRA) 180 mg tablet, Take  by mouth., Disp: , Rfl:     fluticasone (FLONASE) 50 mcg/actuation nasal spray, take 1 spray(s) intranasally once a day for 30 day(s), Disp: 1 Bottle, Rfl: 11    magnesium carbonate 54 mg/5 mL liqd, Take  by mouth., Disp: , Rfl:     albuterol (PROVENTIL HFA, VENTOLIN HFA, PROAIR HFA) 90 mcg/actuation inhaler, Take 2 Puffs by inhalation every four (4) hours as needed for Wheezing., Disp: 1 Inhaler, Rfl: 11   Date Last Reviewed:  9/7/2017   Number of Personal Factors/Comorbidities that affect the Plan of Care: 0: LOW COMPLEXITY   EXAMINATION:   Observation/Orthostatic Postural Assessment:          Patient denies any LE pain, paresthesia or symptoms. Patient denies any increase of symptoms with cough, sneeze or valsalva. Patient denies any saddle paresthesia or bowel/bladder deficits. Patient exhibits a decreased lumbar lordosis and slight increased thoracic kyphosis with convex right type I curve thoracic/lumbar spine. Lower extremity weight bearing is symmetrical. Shoulder symmetry exhibits bilateral protraction. Observation of gait indicates decreased pelvic mobility (patient is a hip walker versus an efficient trunk walker). Lower quadrant bony landmarks are right iliac crest elevated compared to left, level greater trochanter, left greater trochanter anterior compared to right. Leg swing for innominate mobility indicates decreased bilaterally innominate extension. Vertical compression test (VCT) for alignment is 3. Elbow flexion test (EFT) for motor activation is 3. Soft tissue observation indicates restrictions in right iliopsoas, QL, thoracic and lumbar spine paraspinals, and piriformis bilaterally. Palpation: Myofascial assessment indicates restrictions through thoracolumbar fascia and around sacrococcygeal junction. improving Muscle length testing in modified Victor Manuel position exhibits restricted right psoas and rectus femoris  improving. Hamstring flexibility tested supine with straight leg raise (SLR) is WFL. Piriformis length is restricted right greater than left. improving Quadriceps flexibility tested prone with heel to buttock is restricted right. Blaine Cheryl test is positive right for rectus femoris tightness. Gastrocnemius and soleus flexibility are WFL. Sacrotuberous ligament is restricted right. Sacrococcygeal junction exhibits right rotated. Body of coccyx is flexed. ROM: Passive trunk rotation is 75% available to the R and 80% available to the L. Kinetic testing in standing including forward bend test is positive left. Marcher's test is positive right.  Pelvic shear test is standing exhibits decreased excursion of bilateral shear with a hard end feel. Single plane active movements through lumbar spine in standing exhibit decreased lumbar flexion, extension hinge at L3 and restrictions in right side bending. Lumbar positional testing at transverse processes indicates FRS left L4-5 with limitations in extension, rotation and side bending right improving. Prone knee active flexion test is positive right. Spring testing of lumbar spine exhibits decreased a/p mobilization at L4-5 and sacral base. improving  AROM (PROM) Right Left   Hip flexion/Innominate flexion 90 90   Hip extension/Innominate extension 5 5   Hip external rotation (ER) 20 20   Hip internal rotation (IR) 15 20   Strength: Lumbar protective mechanism (LPM) are not tested today for initiation. LPM Strength */5   R anterior to posterior diagonal 2   L anterior to posterior diagonal 2   R posterior to anterior diagonal 3   L posterior to anterior diagonal 3     Manual Muscle Test (*/5) Right Left   Knee extension 5 5   Knee flexion 5 5   Hip flexion 4 4   Hip ER 4 4   Hip IR 4 4   Hip extension 4- 4-   Hip abduction 3+ 3+   Hip adduction 5 5   Ankle DF 5 5   Ankle PF 5 5   Core stability 3+/5     Special Tests:  Aftab Gena test is negative. Scours test is negative  Neurological Screen:  Myotomes: Key muscle strength testing through bilateral LE is intact. Dermatomes: Sensation testing through bilateral lower quadrants for light touch is intact. Reflexes: Patellar (L4) and Achilles (S1) are not tested today. Neural tension tests: Passive straight leg raise (SLR) test is negative. Slump test is negative. .  Functional Mobility:  Challenged with sit to stand transfers, ambulation and rotation movements through thoracic and lumbar spine. Body Structures Involved:  1. Nerves  2. Joints  3. Muscles Body Functions Affected:  1. Sensory/Pain  2. Neuromusculoskeletal  3.  Movement Related Activities and Participation Affected:  1. General Tasks and Demands  2. Mobility  3. Community, Social and [place]   Number of elements (examined above) that affect the Plan of Care: 3: MODERATE COMPLEXITY   CLINICAL PRESENTATION:   Presentation: Evolving clinical presentation with changing clinical characteristics: MODERATE COMPLEXITY   CLINICAL DECISION MAKING:   Outcome Measure: Tool Used: Modified Oswestry Low Back Pain Questionnaire  Score:  Initial: 29/50  Most Recent: 25/50 (Date: 9-5-17 )   Interpretation of Score: Each section is scored on a 0-5 scale, 5 representing the greatest disability. The scores of each section are added together for a total score of 50. Medical Necessity:   · Patient is expected to demonstrate progress in strength, range of motion, balance and coordination to increase independence with ADLs and ambulation without discomfort. .  Reason for Services/Other Comments:  · Patient continues to require skilled intervention due to increased pain with activity, challenged with daily tasks secondary to discomfort and limited mobility in lumbar spine and pelvis. .   Use of outcome tool(s) and clinical judgement create a POC that gives a: Questionable prediction of patient's progress: MODERATE COMPLEXITY            TREATMENT:   (In addition to Assessment/Re-Assessment sessions the following treatments were rendered)  Pre-treatment Symptoms/Complaints:  Patient notes continues to have right anterior hip pain and over the past week notes increased left anterior hip/groin pain. Pain: Initial: Pain Intensity 1: 4  Pain Location 1: Hip  Pain Orientation 1: Right  Post Session:  3/10     Therapeutic Exercise: (10 Minutes):  Exercises per grid below to improve mobility, strength, balance and coordination. Required minimal verbal and manual cues to promote proper body alignment, promote proper body posture and promote proper body mechanics.   Progressed resistance, range, repetitions and complexity of movement as indicated. Date:  9/7/2017   Activity/Exercise Parameters   Child pose X 5 reps, 10 sec holds   Abdominal bracing X 10, 5 sec holds  X 10 reps UE/LE, 5 sec holds with distraction   Postural education    Quadriped arch/sag X 5 reps, 10 sec holds   Lower trunk rotation multifidi activation X 5 minute education of core stability and techniques  X 5 reps, slow and controlled bilaterally. 1/2 prone rectus femoris stretch/hip flexors X 5 reps contract relax techniques . Manual Therapy (    Soft Tissue Mobilization Duration  Duration: 50 Minutes): Manual techniques to facilitate improved motion and decreased pain. · Supine anterior right iliopsoas release with FMP of lower trunk rotation  · Supine right cecum soft tissue mobilization   · Prone hip in axis right hip, with FMP and NMR into hip ER/IR  · Prone left sacral mobilization p/a with FMP of left hip ER/IR followed with NMR  · Prone soft tissue mobilization around bony contours of iliac crest and sacral sulcus  · Side lying left for right QL soft tissue mobilization with pelvic PNF patterns of anterior elevation and posterior depression. (Used abbreviations: MET - muscle energy technique; PNF - proprioceptive neuromuscular facilitation; NMR - neuromuscular re-education; a/p - anterior to posterior; p/a - posterior to anterior, FMP - functional movement patterns)    Treatment/Session Assessment:    · Response to Treatment:  Improved mobility in anterior pelvis and hip at end of session today. Continues to present with significant soft tissue restrictions. · Compliance with Program/Exercises: compliant all of the time. · Recommendations/Intent for next treatment session: \"Next visit will focus on advancements to more challenging activities\".   Total Treatment Duration:  PT Patient Time In/Time Out  Time In: 1330  Time Out: [address] [place] [address] Beti Michaels, MARYAM

## 2017-09-11 ENCOUNTER — HOSPITAL ENCOUNTER (OUTPATIENT)
Dept: PHYSICAL THERAPY | Age: 52
Discharge: HOME | End: 2017-09-11
Payer: COMMERCIAL

## 2017-09-11 PROCEDURE — 97110 THERAPEUTIC EXERCISES: CPT

## 2017-09-11 PROCEDURE — 97140 MANUAL THERAPY 1/> REGIONS: CPT

## 2017-09-11 NOTE — PROGRESS NOTES
Rohit Dopp  : [facility] [address]  [phone]   [phone]        OUTPATIENT PHYSICAL THERAPY:Daily Note 2017    ICD-10: Treatment Diagnosis: low back pain M54.5  ICD-10: Treatment Diagnosis: muscle spasm of back M62.830    Precautions/Allergies:   Penicillin; Aciphex [rabeprazole]; and Augmentin [amoxicillin-pot clavulanate]   Fall Risk Score: 1 (? 5 = High Risk)  MD Orders: self referred MEDICAL/REFERRING DIAGNOSIS:  Low back pain [M54.5]   DATE OF ONSET: 2017 flare up  REFERRING PHYSICIAN: Steffi Hernandez 89: as needed     INITIAL ASSESSMENT:  Ms. Francisco Gan is a 47 y.o. female who presents to physical therapy with chronic low back pain with a flare up in 2017. She demonstrates increased soft tissue restrictions along her right side of thoracic and lumbar spine and demonstrates limited ROM through thoracic and lumbar spine flexion, extension and rotation. She demonstrates arthrokinematic dysfunctions throughout her lumbar spine and pelvis, soft tissue restrictions through anterior pelvis/hip right greater than left, decreased activation of core stability and weakness through hips and pelvis. She would benefit from skilled physical therapy to improve overall mobility and function. 17: Patient has attended 4 physical therapy sessions, she has demonstrated improved mobility through pelvis and lumbar spine, however, continues to demonstrate joint and soft tissue restrictions, strength deficits and postural deficits. She would benefit from continued physical therapy to improve overall mobility and function with daily activities. PROBLEM LIST (Impacting functional limitations):  1. Decreased Strength  2. Decreased ADL/Functional Activities  3. Decreased Ambulation Ability/Technique  4. Increased Pain  5. Decreased Activity Tolerance  6.  Decreased Flexibility/Joint Mobility INTERVENTIONS PLANNED:  1. Home Exercise Program (HEP)  2. Manual Therapy  3. Neuromuscular Re-education/Strengthening  4. Range of Motion (ROM)  5. Therapeutic Exercise/Strengthening   TREATMENT PLAN:  Effective Dates:  9/5/17 TO 11/28/17. Frequency/Duration: 2x a week for 6 weeks and progress to 1x a week for 6 weeks. (Patient may only be seen 1x a week secondary to therapist availability.)  GOALS: (Goals have been discussed and agreed upon with patient.)    Discharge Goals: Time Frame: 12 weeks  1. Patient demonstrates independence with her HEP without verbal cueing from therapist. - GOAL MET  2. Patient able to ambulate for 30 minutes without onset of low back pain. - ONGOING  3. Patient able to perform sit to stand transfers from various surfaces without onset of low back pain. - ALMOST MET  4. Improve Oswestry outcome measure score from 29/50 to 10/50 to perform ADLs - ONGOING  Rehabilitation Potential For Stated Goals: Excellent  Regarding Maria Ines Daly's therapy, I certify that the treatment plan above will be carried out by a therapist or under their direction. Thank you for this referral,  An Mars, PT     Referring Physician Signature: Nettie Poole, *              Date                    The information in this section was collected on 8/8/17 (except where otherwise noted). HISTORY:   History of Present Injury/Illness (Reason for Referral):  Chronic low back pain for years and she has been doing really well up until June 2017. Prior to the flare up she was exercising regularly, cycling and having little to no discomfort. Patient notes increased pain and stiffness with sit to stand transfers. She is challenged with bending and lifting, pain with walking and sitting. Past Medical History/Comorbidities: . Ms. Vince Kulkarni  has a past medical history of Allergic rhinitis, cause unspecified; Esophageal reflux; Essential hypertension, benign;  Extrinsic asthma, unspecified; Generalized anxiety disorder; Hypothyroidism; and Unspecified sleep apnea. Ms. Yadi Edgar  has a past surgical history that includes cholecystectomy (1/07); back surgery; and gyn (12/10). Social History/Living Environment:     Lives in a private home with her , no physical barriers present in home setting  Prior Level of Function/Work/Activity:   Independent, currently challenged with sitting prolonged at work and unable to perform normal workout routine secondary to pain. Dominant Side:         RIGHT    Current Medications:       Current Outpatient Prescriptions:     lisinopril (PRINIVIL, ZESTRIL) 10 mg tablet, TAKE 1 TABLET BY MOUTH DAILY, Disp: 90 Tab, Rfl: 1    fluticasone-vilanterol (BREO ELLIPTA) 200-25 mcg/dose inhaler, Take 1 Puff by inhalation daily. , Disp: 1 Inhaler, Rfl: 5    montelukast (SINGULAIR) 10 mg tablet, TAKE ONE TABLET BY MOUTH EVERY DAY, Disp: 90 Tab, Rfl: 1    olopatadine (PATANOL) 0.1 % ophthalmic solution, Administer 2 Drops to both eyes two (2) times a day., Disp: 5 mL, Rfl: 5    B.infantis-B.ani-B.long-B.bifi (PROBIOTIC 4X) 10-15 mg TbEC, Take  by mouth., Disp: , Rfl:     omeprazole (PRILOSEC) 40 mg capsule, TAKE ONE CAPSULE BY MOUTH EVERY DAY, Disp: 30 Cap, Rfl: 5    PROAIR RESPICLICK 90 mcg/actuation aepb, INHALE ONE PUFF BY MOUTH EVERY 4 HOURS AS NEEDED, Disp: , Rfl: 5    fexofenadine (ALLEGRA) 180 mg tablet, Take  by mouth., Disp: , Rfl:     fluticasone (FLONASE) 50 mcg/actuation nasal spray, take 1 spray(s) intranasally once a day for 30 day(s), Disp: 1 Bottle, Rfl: 11    magnesium carbonate 54 mg/5 mL liqd, Take  by mouth., Disp: , Rfl:     albuterol (PROVENTIL HFA, VENTOLIN HFA, PROAIR HFA) 90 mcg/actuation inhaler, Take 2 Puffs by inhalation every four (4) hours as needed for Wheezing., Disp: 1 Inhaler, Rfl: 11   Date Last Reviewed:  9/11/2017   Number of Personal Factors/Comorbidities that affect the Plan of Care: 0: LOW COMPLEXITY   EXAMINATION:   Observation/Orthostatic Postural Assessment:          Patient denies any LE pain, paresthesia or symptoms. Patient denies any increase of symptoms with cough, sneeze or valsalva. Patient denies any saddle paresthesia or bowel/bladder deficits. Patient exhibits a decreased lumbar lordosis and slight increased thoracic kyphosis with convex right type I curve thoracic/lumbar spine. Lower extremity weight bearing is symmetrical. Shoulder symmetry exhibits bilateral protraction. Observation of gait indicates decreased pelvic mobility (patient is a hip walker versus an efficient trunk walker). Lower quadrant bony landmarks are right iliac crest elevated compared to left, level greater trochanter, left greater trochanter anterior compared to right. Leg swing for innominate mobility indicates decreased bilaterally innominate extension. Vertical compression test (VCT) for alignment is 3. Elbow flexion test (EFT) for motor activation is 3. Soft tissue observation indicates restrictions in right iliopsoas, QL, thoracic and lumbar spine paraspinals, and piriformis bilaterally. Palpation: Myofascial assessment indicates restrictions through thoracolumbar fascia and around sacrococcygeal junction. improving Muscle length testing in modified Victor Manuel position exhibits restricted right psoas and rectus femoris  improving. Hamstring flexibility tested supine with straight leg raise (SLR) is WFL. Piriformis length is restricted right greater than left. improving Quadriceps flexibility tested prone with heel to buttock is restricted right. Loletta Sales test is positive right for rectus femoris tightness. Gastrocnemius and soleus flexibility are WFL. Sacrotuberous ligament is restricted right. Sacrococcygeal junction exhibits right rotated. Body of coccyx is flexed. ROM: Passive trunk rotation is 75% available to the R and 80% available to the L. Kinetic testing in standing including forward bend test is positive left. Marcher's test is positive right.  Pelvic shear test is standing exhibits decreased excursion of bilateral shear with a hard end feel. Single plane active movements through lumbar spine in standing exhibit decreased lumbar flexion, extension hinge at L3 and restrictions in right side bending. Lumbar positional testing at transverse processes indicates FRS left L4-5 with limitations in extension, rotation and side bending right improving. Prone knee active flexion test is positive right. Spring testing of lumbar spine exhibits decreased a/p mobilization at L4-5 and sacral base. improving  AROM (PROM) Right Left   Hip flexion/Innominate flexion 90 90   Hip extension/Innominate extension 5 5   Hip external rotation (ER) 20 20   Hip internal rotation (IR) 15 20   Strength: Lumbar protective mechanism (LPM) are not tested today for initiation. LPM Strength */5   R anterior to posterior diagonal 2   L anterior to posterior diagonal 2   R posterior to anterior diagonal 3   L posterior to anterior diagonal 3     Manual Muscle Test (*/5) Right Left   Knee extension 5 5   Knee flexion 5 5   Hip flexion 4 4   Hip ER 4 4   Hip IR 4 4   Hip extension 4- 4-   Hip abduction 3+ 3+   Hip adduction 5 5   Ankle DF 5 5   Ankle PF 5 5   Core stability 3+/5     Special Tests:  Vanetta Blew test is negative. Scours test is negative  Neurological Screen:  Myotomes: Key muscle strength testing through bilateral LE is intact. Dermatomes: Sensation testing through bilateral lower quadrants for light touch is intact. Reflexes: Patellar (L4) and Achilles (S1) are not tested today. Neural tension tests: Passive straight leg raise (SLR) test is negative. Slump test is negative. .  Functional Mobility:  Challenged with sit to stand transfers, ambulation and rotation movements through thoracic and lumbar spine. Body Structures Involved:  1. Nerves  2. Joints  3. Muscles Body Functions Affected:  1. Sensory/Pain  2. Neuromusculoskeletal  3.  Movement Related Activities and Participation Affected:  1. General Tasks and Demands  2. Mobility  3. Community, Social and [place]   Number of elements (examined above) that affect the Plan of Care: 3: MODERATE COMPLEXITY   CLINICAL PRESENTATION:   Presentation: Evolving clinical presentation with changing clinical characteristics: MODERATE COMPLEXITY   CLINICAL DECISION MAKING:   Outcome Measure: Tool Used: Modified Oswestry Low Back Pain Questionnaire  Score:  Initial: 29/50  Most Recent: 25/50 (Date: 9-5-17 )   Interpretation of Score: Each section is scored on a 0-5 scale, 5 representing the greatest disability. The scores of each section are added together for a total score of 50. Medical Necessity:   · Patient is expected to demonstrate progress in strength, range of motion, balance and coordination to increase independence with ADLs and ambulation without discomfort. .  Reason for Services/Other Comments:  · Patient continues to require skilled intervention due to increased pain with activity, challenged with daily tasks secondary to discomfort and limited mobility in lumbar spine and pelvis. .   Use of outcome tool(s) and clinical judgement create a POC that gives a: Questionable prediction of patient's progress: MODERATE COMPLEXITY            TREATMENT:   (In addition to Assessment/Re-Assessment sessions the following treatments were rendered)  Pre-treatment Symptoms/Complaints:  Patient notes improvements noted in her overall mobility in right pelvis with getting out of bed and out of the car, continues to have pain into anterior hip and into lateral torso and right lumbar spine . Pain: Initial: Pain Intensity 1: 3  Pain Location 1: Hip  Pain Orientation 1: Right  Post Session:  2/10     Therapeutic Exercise: (10 Minutes):  Exercises per grid below to improve mobility, strength, balance and coordination.   Required minimal verbal and manual cues to promote proper body alignment, promote proper body posture and promote proper body mechanics. Progressed resistance, range, repetitions and complexity of movement as indicated. Date:  9/11/2017   Activity/Exercise Parameters   Child pose X 5 reps, 10 sec holds   Abdominal bracing X 10, 5 sec holds  X 10 reps UE/LE, 5 sec holds with distraction   Postural education    Quadriped arch/sag X 5 reps, 10 sec holds   Lower trunk rotation multifidi activation X 5 reps, slow and controlled bilaterally. 1/2 prone rectus femoris stretch/hip flexors X 5 reps contract relax techniques . Standing innominate swings X 30 seconds flexion/extension with added hip ER/IR, BLE     Manual Therapy (    Soft Tissue Mobilization Duration  Duration: 50 Minutes): Manual techniques to facilitate improved motion and decreased pain. · Supine anterior right iliopsoas release with FMP of lower trunk rotation  · Supine right cecum soft tissue mobilization   · Prone hip in axis right hip, with FMP and NMR into hip ER/IR  · Prone left sacral mobilization p/a with FMP of left hip ER/IR followed with NMR  · Prone soft tissue mobilization around bony contours of iliac crest and sacral sulcus  · Side lying left for right QL soft tissue mobilization with pelvic PNF patterns of anterior elevation and posterior depression. (Used abbreviations: MET - muscle energy technique; PNF - proprioceptive neuromuscular facilitation; NMR - neuromuscular re-education; a/p - anterior to posterior; p/a - posterior to anterior, FMP - functional movement patterns)    Treatment/Session Assessment:    · Response to Treatment:  Improved mobility noted through pelvis, decreased restrictions noted in anterior hip/pelvis. · Compliance with Program/Exercises: compliant all of the time. · Recommendations/Intent for next treatment session: \"Next visit will focus on advancements to more challenging activities\".   Total Treatment Duration:  PT Patient Time In/Time Out  Time In: 1500  Time Out: [address] Ellen Cummings Lignum File, PT

## 2017-09-21 ENCOUNTER — HOSPITAL ENCOUNTER (OUTPATIENT)
Dept: PHYSICAL THERAPY | Age: 52
Discharge: HOME | End: 2017-09-21
Payer: COMMERCIAL

## 2017-09-21 PROCEDURE — 97140 MANUAL THERAPY 1/> REGIONS: CPT

## 2017-09-21 PROCEDURE — 97110 THERAPEUTIC EXERCISES: CPT

## 2017-09-22 NOTE — PROGRESS NOTES
Claudia Brooke  : [facility] [address]  [phone]   [phone]        OUTPATIENT PHYSICAL THERAPY:Daily Note 2017    ICD-10: Treatment Diagnosis: low back pain M54.5  ICD-10: Treatment Diagnosis: muscle spasm of back M62.830    Precautions/Allergies:   Penicillin; Aciphex [rabeprazole]; and Augmentin [amoxicillin-pot clavulanate]   Fall Risk Score: 1 (? 5 = High Risk)  MD Orders: self referred MEDICAL/REFERRING DIAGNOSIS:  Low back pain [M54.5]   DATE OF ONSET: 2017 flare up  REFERRING PHYSICIAN: Steffi Hernandez 89: as needed     INITIAL ASSESSMENT:  Ms. Giles Ulloa is a 47 y.o. female who presents to physical therapy with chronic low back pain with a flare up in 2017. She demonstrates increased soft tissue restrictions along her right side of thoracic and lumbar spine and demonstrates limited ROM through thoracic and lumbar spine flexion, extension and rotation. She demonstrates arthrokinematic dysfunctions throughout her lumbar spine and pelvis, soft tissue restrictions through anterior pelvis/hip right greater than left, decreased activation of core stability and weakness through hips and pelvis. She would benefit from skilled physical therapy to improve overall mobility and function. 17: Patient has attended 4 physical therapy sessions, she has demonstrated improved mobility through pelvis and lumbar spine, however, continues to demonstrate joint and soft tissue restrictions, strength deficits and postural deficits. She would benefit from continued physical therapy to improve overall mobility and function with daily activities. PROBLEM LIST (Impacting functional limitations):  1. Decreased Strength  2. Decreased ADL/Functional Activities  3. Decreased Ambulation Ability/Technique  4. Increased Pain  5. Decreased Activity Tolerance  6.  Decreased Flexibility/Joint Mobility INTERVENTIONS PLANNED:  1. Home Exercise Program (HEP)  2. Manual Therapy  3. Neuromuscular Re-education/Strengthening  4. Range of Motion (ROM)  5. Therapeutic Exercise/Strengthening   TREATMENT PLAN:  Effective Dates:  9/5/17 TO 11/28/17. Frequency/Duration: 2x a week for 6 weeks and progress to 1x a week for 6 weeks. (Patient may only be seen 1x a week secondary to therapist availability.)  GOALS: (Goals have been discussed and agreed upon with patient.)    Discharge Goals: Time Frame: 12 weeks  1. Patient demonstrates independence with her HEP without verbal cueing from therapist. - GOAL MET  2. Patient able to ambulate for 30 minutes without onset of low back pain. - ONGOING  3. Patient able to perform sit to stand transfers from various surfaces without onset of low back pain. - ALMOST MET  4. Improve Oswestry outcome measure score from 29/50 to 10/50 to perform ADLs - ONGOING  Rehabilitation Potential For Stated Goals: Excellent  Regarding Sienna Daly's therapy, I certify that the treatment plan above will be carried out by a therapist or under their direction. Thank you for this referral,  Mecca June, PT     Referring Physician Signature: Edwin Cutler, *              Date                    The information in this section was collected on 8/8/17 (except where otherwise noted). HISTORY:   History of Present Injury/Illness (Reason for Referral):  Chronic low back pain for years and she has been doing really well up until June 2017. Prior to the flare up she was exercising regularly, cycling and having little to no discomfort. Patient notes increased pain and stiffness with sit to stand transfers. She is challenged with bending and lifting, pain with walking and sitting. Past Medical History/Comorbidities: . Ms. Kendall Pitt  has a past medical history of Allergic rhinitis, cause unspecified; Esophageal reflux; Essential hypertension, benign;  Extrinsic asthma, unspecified; Generalized anxiety disorder; Hypothyroidism; and Unspecified sleep apnea. Ms. Azar Francois  has a past surgical history that includes cholecystectomy (1/07); back surgery; and gyn (12/10). Social History/Living Environment:     Lives in a private home with her , no physical barriers present in home setting  Prior Level of Function/Work/Activity:   Independent, currently challenged with sitting prolonged at work and unable to perform normal workout routine secondary to pain. Dominant Side:         RIGHT    Current Medications:       Current Outpatient Prescriptions:     lisinopril (PRINIVIL, ZESTRIL) 10 mg tablet, TAKE 1 TABLET BY MOUTH DAILY, Disp: 90 Tab, Rfl: 1    fluticasone-vilanterol (BREO ELLIPTA) 200-25 mcg/dose inhaler, Take 1 Puff by inhalation daily. , Disp: 1 Inhaler, Rfl: 5    montelukast (SINGULAIR) 10 mg tablet, TAKE ONE TABLET BY MOUTH EVERY DAY, Disp: 90 Tab, Rfl: 1    olopatadine (PATANOL) 0.1 % ophthalmic solution, Administer 2 Drops to both eyes two (2) times a day., Disp: 5 mL, Rfl: 5    B.infantis-B.ani-B.long-B.bifi (PROBIOTIC 4X) 10-15 mg TbEC, Take  by mouth., Disp: , Rfl:     omeprazole (PRILOSEC) 40 mg capsule, TAKE ONE CAPSULE BY MOUTH EVERY DAY, Disp: 30 Cap, Rfl: 5    PROAIR RESPICLICK 90 mcg/actuation aepb, INHALE ONE PUFF BY MOUTH EVERY 4 HOURS AS NEEDED, Disp: , Rfl: 5    fexofenadine (ALLEGRA) 180 mg tablet, Take  by mouth., Disp: , Rfl:     fluticasone (FLONASE) 50 mcg/actuation nasal spray, take 1 spray(s) intranasally once a day for 30 day(s), Disp: 1 Bottle, Rfl: 11    magnesium carbonate 54 mg/5 mL liqd, Take  by mouth., Disp: , Rfl:     albuterol (PROVENTIL HFA, VENTOLIN HFA, PROAIR HFA) 90 mcg/actuation inhaler, Take 2 Puffs by inhalation every four (4) hours as needed for Wheezing., Disp: 1 Inhaler, Rfl: 11   Date Last Reviewed:  9/21/2017   Number of Personal Factors/Comorbidities that affect the Plan of Care: 0: LOW COMPLEXITY   EXAMINATION:   Observation/Orthostatic Postural Assessment:          Patient denies any LE pain, paresthesia or symptoms. Patient denies any increase of symptoms with cough, sneeze or valsalva. Patient denies any saddle paresthesia or bowel/bladder deficits. Patient exhibits a decreased lumbar lordosis and slight increased thoracic kyphosis with convex right type I curve thoracic/lumbar spine. Lower extremity weight bearing is symmetrical. Shoulder symmetry exhibits bilateral protraction. Observation of gait indicates decreased pelvic mobility (patient is a hip walker versus an efficient trunk walker). Lower quadrant bony landmarks are right iliac crest elevated compared to left, level greater trochanter, left greater trochanter anterior compared to right. Leg swing for innominate mobility indicates decreased bilaterally innominate extension. Vertical compression test (VCT) for alignment is 3. Elbow flexion test (EFT) for motor activation is 3. Soft tissue observation indicates restrictions in right iliopsoas, QL, thoracic and lumbar spine paraspinals, and piriformis bilaterally. Palpation: Myofascial assessment indicates restrictions through thoracolumbar fascia and around sacrococcygeal junction. improving Muscle length testing in modified Victor Manuel position exhibits restricted right psoas and rectus femoris  improving. Hamstring flexibility tested supine with straight leg raise (SLR) is WFL. Piriformis length is restricted right greater than left. improving Quadriceps flexibility tested prone with heel to buttock is restricted right. [place] test is positive right for rectus femoris tightness. Gastrocnemius and soleus flexibility are WFL. Sacrotuberous ligament is restricted right. Sacrococcygeal junction exhibits right rotated. Body of coccyx is flexed. ROM: Passive trunk rotation is 75% available to the R and 80% available to the L. Kinetic testing in standing including forward bend test is positive left. Marcher's test is positive right.  Pelvic shear test is standing exhibits decreased excursion of bilateral shear with a hard end feel. Single plane active movements through lumbar spine in standing exhibit decreased lumbar flexion, extension hinge at L3 and restrictions in right side bending. Lumbar positional testing at transverse processes indicates FRS left L4-5 with limitations in extension, rotation and side bending right improving. Prone knee active flexion test is positive right. Spring testing of lumbar spine exhibits decreased a/p mobilization at L4-5 and sacral base. improving  AROM (PROM) Right Left   Hip flexion/Innominate flexion 90 90   Hip extension/Innominate extension 5 5   Hip external rotation (ER) 20 20   Hip internal rotation (IR) 15 20   Strength: Lumbar protective mechanism (LPM) are not tested today for initiation. LPM Strength */5   R anterior to posterior diagonal 2   L anterior to posterior diagonal 2   R posterior to anterior diagonal 3   L posterior to anterior diagonal 3     Manual Muscle Test (*/5) Right Left   Knee extension 5 5   Knee flexion 5 5   Hip flexion 4 4   Hip ER 4 4   Hip IR 4 4   Hip extension 4- 4-   Hip abduction 3+ 3+   Hip adduction 5 5   Ankle DF 5 5   Ankle PF 5 5   Core stability 3+/5     Special Tests:  Mp Saner test is negative. Scours test is negative  Neurological Screen:  Myotomes: Key muscle strength testing through bilateral LE is intact. Dermatomes: Sensation testing through bilateral lower quadrants for light touch is intact. Reflexes: Patellar (L4) and Achilles (S1) are not tested today. Neural tension tests: Passive straight leg raise (SLR) test is negative. Slump test is negative. .  Functional Mobility:  Challenged with sit to stand transfers, ambulation and rotation movements through thoracic and lumbar spine. Body Structures Involved:  1. Nerves  2. Joints  3. Muscles Body Functions Affected:  1. Sensory/Pain  2. Neuromusculoskeletal  3.  Movement Related Activities and Participation Affected:  1. General Tasks and Demands  2. Mobility  3. Community, Social and [place]   Number of elements (examined above) that affect the Plan of Care: 3: MODERATE COMPLEXITY   CLINICAL PRESENTATION:   Presentation: Evolving clinical presentation with changing clinical characteristics: MODERATE COMPLEXITY   CLINICAL DECISION MAKING:   Outcome Measure: Tool Used: Modified Oswestry Low Back Pain Questionnaire  Score:  Initial: 29/50  Most Recent: 25/50 (Date: 9-5-17 )   Interpretation of Score: Each section is scored on a 0-5 scale, 5 representing the greatest disability. The scores of each section are added together for a total score of 50. Medical Necessity:   · Patient is expected to demonstrate progress in strength, range of motion, balance and coordination to increase independence with ADLs and ambulation without discomfort. .  Reason for Services/Other Comments:  · Patient continues to require skilled intervention due to increased pain with activity, challenged with daily tasks secondary to discomfort and limited mobility in lumbar spine and pelvis. .   Use of outcome tool(s) and clinical judgement create a POC that gives a: Questionable prediction of patient's progress: MODERATE COMPLEXITY            TREATMENT:   (In addition to Assessment/Re-Assessment sessions the following treatments were rendered)  Pre-treatment Symptoms/Complaints:  Patient followed up with her MD this week secondary to increased sharp pain in her right pelvic/abdomen. She is scheduled for a scan on Monday to assess abdomen and pelvis. Pain: Initial: Pain Intensity 1: 5  Pain Location 1: Hip  Pain Orientation 1: Right  Post Session:  2/10     Therapeutic Exercise: (10 Minutes):  Exercises per grid below to improve mobility, strength, balance and coordination. Required minimal verbal and manual cues to promote proper body alignment, promote proper body posture and promote proper body mechanics.   Progressed resistance, range, repetitions and complexity of movement as indicated. Date:  9/21/2017   Activity/Exercise Parameters   Child pose X 5 reps, 10 sec holds   Abdominal bracing X 10, 5 sec holds  X 10 reps UE/LE, 5 sec holds with distraction   Postural education    Quadriped arch/sag X 5 reps, 10 sec holds   Lower trunk rotation multifidi activation X 5 reps, slow and controlled bilaterally. 1/2 prone rectus femoris stretch/hip flexors    Standing innominate swings      Manual Therapy (    Soft Tissue Mobilization Duration  Duration: 50 Minutes): Manual techniques to facilitate improved motion and decreased pain. · Supine anterior right iliopsoas release with FMP of lower trunk rotation  · Supine right cecum soft tissue mobilization   · Prone hip in axis right hip, with FMP and NMR into hip ER/IR  · Prone left sacral mobilization p/a with FMP of left hip ER/IR followed with NMR  · Prone soft tissue mobilization around bony contours of iliac crest and sacral sulcus  · Side lying left for right QL soft tissue mobilization with pelvic PNF patterns of anterior elevation and posterior depression. (Used abbreviations: MET - muscle energy technique; PNF - proprioceptive neuromuscular facilitation; NMR - neuromuscular re-education; a/p - anterior to posterior; p/a - posterior to anterior, FMP - functional movement patterns)    Treatment/Session Assessment:    · Response to Treatment:  Improving left pelvic mobility, improve hip flexor mobility at end of session, improved ability to perform self stretches without discomfort. · Compliance with Program/Exercises: compliant all of the time. · Recommendations/Intent for next treatment session: \"Next visit will focus on advancements to more challenging activities\".   Total Treatment Duration:  PT Patient Time In/Time Out  Time In: 1135  Time Out: [id number] [facility] Jessenia Bal PT

## 2017-09-25 ENCOUNTER — HOSPITAL ENCOUNTER (OUTPATIENT)
Dept: LAB | Age: 52
Discharge: HOME | End: 2017-09-25
Attending: FAMILY MEDICINE
Payer: COMMERCIAL

## 2017-09-25 ENCOUNTER — HOSPITAL ENCOUNTER (OUTPATIENT)
Dept: CT IMAGING | Age: 52
Discharge: HOME | End: 2017-09-25
Attending: FAMILY MEDICINE
Payer: COMMERCIAL

## 2017-09-25 ENCOUNTER — HOSPITAL ENCOUNTER (OUTPATIENT)
Dept: PHYSICAL THERAPY | Age: 52
Discharge: HOME | End: 2017-09-25
Payer: COMMERCIAL

## 2017-09-25 DIAGNOSIS — R10.31 RIGHT LOWER QUADRANT ABDOMINAL PAIN: ICD-10-CM

## 2017-09-25 LAB — CREAT SERPL-MCNC: 0.9 MG/DL (ref 0.6–1)

## 2017-09-25 PROCEDURE — 97140 MANUAL THERAPY 1/> REGIONS: CPT

## 2017-09-25 PROCEDURE — 97110 THERAPEUTIC EXERCISES: CPT

## 2017-09-25 RX ORDER — SODIUM CHLORIDE 0.9 % (FLUSH) 0.9 %
10 SYRINGE (ML) INJECTION
Status: COMPLETED | OUTPATIENT
Start: 2017-09-25 | End: 2017-09-25

## 2017-09-25 RX ADMIN — Medication 10 ML: at 11:29

## 2017-09-25 RX ADMIN — IOPAMIDOL 100 ML: 755 INJECTION, SOLUTION INTRAVENOUS at 11:29

## 2017-09-25 RX ADMIN — DIATRIZOATE MEGLUMINE AND DIATRIZOATE SODIUM 15 ML: 660; 100 LIQUID ORAL; RECTAL at 11:29

## 2017-09-25 RX ADMIN — SODIUM CHLORIDE 100 ML: 900 INJECTION, SOLUTION INTRAVENOUS at 11:29

## 2017-09-26 NOTE — PROGRESS NOTES
Hayden Penn  : [facility] [address]  [phone]   [phone]        OUTPATIENT PHYSICAL THERAPY:Daily Note 2017    ICD-10: Treatment Diagnosis: low back pain M54.5  ICD-10: Treatment Diagnosis: muscle spasm of back M62.830    Precautions/Allergies:   Penicillin; Aciphex [rabeprazole]; and Augmentin [amoxicillin-pot clavulanate]   Fall Risk Score: 1 (? 5 = High Risk)  MD Orders: self referred MEDICAL/REFERRING DIAGNOSIS:  Low back pain [M54.5]   DATE OF ONSET: 2017 flare up  REFERRING PHYSICIAN: Steffi Hernandez 89: as needed     INITIAL ASSESSMENT:  Ms. Sai Pink is a 47 y.o. female who presents to physical therapy with chronic low back pain with a flare up in 2017. She demonstrates increased soft tissue restrictions along her right side of thoracic and lumbar spine and demonstrates limited ROM through thoracic and lumbar spine flexion, extension and rotation. She demonstrates arthrokinematic dysfunctions throughout her lumbar spine and pelvis, soft tissue restrictions through anterior pelvis/hip right greater than left, decreased activation of core stability and weakness through hips and pelvis. She would benefit from skilled physical therapy to improve overall mobility and function. 17: Patient has attended 4 physical therapy sessions, she has demonstrated improved mobility through pelvis and lumbar spine, however, continues to demonstrate joint and soft tissue restrictions, strength deficits and postural deficits. She would benefit from continued physical therapy to improve overall mobility and function with daily activities. PROBLEM LIST (Impacting functional limitations):  1. Decreased Strength  2. Decreased ADL/Functional Activities  3. Decreased Ambulation Ability/Technique  4. Increased Pain  5. Decreased Activity Tolerance  6.  Decreased Flexibility/Joint Mobility INTERVENTIONS PLANNED:  1. Home Exercise Program (HEP)  2. Manual Therapy  3. Neuromuscular Re-education/Strengthening  4. Range of Motion (ROM)  5. Therapeutic Exercise/Strengthening   TREATMENT PLAN:  Effective Dates:  9/5/17 TO 11/28/17. Frequency/Duration: 2x a week for 6 weeks and progress to 1x a week for 6 weeks. (Patient may only be seen 1x a week secondary to therapist availability.)  GOALS: (Goals have been discussed and agreed upon with patient.)    Discharge Goals: Time Frame: 12 weeks  1. Patient demonstrates independence with her HEP without verbal cueing from therapist. - GOAL MET  2. Patient able to ambulate for 30 minutes without onset of low back pain. - ONGOING  3. Patient able to perform sit to stand transfers from various surfaces without onset of low back pain. - ALMOST MET  4. Improve Oswestry outcome measure score from 29/50 to 10/50 to perform ADLs - ONGOING  Rehabilitation Potential For Stated Goals: Excellent  Regarding Yamila Daly's therapy, I certify that the treatment plan above will be carried out by a therapist or under their direction. Thank you for this referral,  Radha Lemon, PT     Referring Physician Signature: [facility], *              Date                    The information in this section was collected on 8/8/17 (except where otherwise noted). HISTORY:   History of Present Injury/Illness (Reason for Referral):  Chronic low back pain for years and she has been doing really well up until June 2017. Prior to the flare up she was exercising regularly, cycling and having little to no discomfort. Patient notes increased pain and stiffness with sit to stand transfers. She is challenged with bending and lifting, pain with walking and sitting. Past Medical History/Comorbidities: . Ms. Azar Francois  has a past medical history of Allergic rhinitis, cause unspecified; Esophageal reflux; Essential hypertension, benign;  Extrinsic asthma, unspecified; Generalized anxiety disorder; Hypothyroidism; and Unspecified sleep apnea. Ms. Tyler Portillo  has a past surgical history that includes cholecystectomy (1/07); back surgery; and gyn (12/10). Social History/Living Environment:     Lives in a private home with her , no physical barriers present in home setting  Prior Level of Function/Work/Activity:   Independent, currently challenged with sitting prolonged at work and unable to perform normal workout routine secondary to pain. Dominant Side:         RIGHT    Current Medications:       Current Outpatient Prescriptions:     lisinopril (PRINIVIL, ZESTRIL) 10 mg tablet, TAKE 1 TABLET BY MOUTH DAILY, Disp: 90 Tab, Rfl: 1    fluticasone-vilanterol (BREO ELLIPTA) 200-25 mcg/dose inhaler, Take 1 Puff by inhalation daily. , Disp: 1 Inhaler, Rfl: 5    montelukast (SINGULAIR) 10 mg tablet, TAKE ONE TABLET BY MOUTH EVERY DAY, Disp: 90 Tab, Rfl: 1    olopatadine (PATANOL) 0.1 % ophthalmic solution, Administer 2 Drops to both eyes two (2) times a day., Disp: 5 mL, Rfl: 5    B.infantis-B.ani-B.long-B.bifi (PROBIOTIC 4X) 10-15 mg TbEC, Take  by mouth., Disp: , Rfl:     omeprazole (PRILOSEC) 40 mg capsule, TAKE ONE CAPSULE BY MOUTH EVERY DAY, Disp: 30 Cap, Rfl: 5    PROAIR RESPICLICK 90 mcg/actuation aepb, INHALE ONE PUFF BY MOUTH EVERY 4 HOURS AS NEEDED, Disp: , Rfl: 5    fexofenadine (ALLEGRA) 180 mg tablet, Take  by mouth., Disp: , Rfl:     fluticasone (FLONASE) 50 mcg/actuation nasal spray, take 1 spray(s) intranasally once a day for 30 day(s), Disp: 1 Bottle, Rfl: 11    magnesium carbonate 54 mg/5 mL liqd, Take  by mouth., Disp: , Rfl:     albuterol (PROVENTIL HFA, VENTOLIN HFA, PROAIR HFA) 90 mcg/actuation inhaler, Take 2 Puffs by inhalation every four (4) hours as needed for Wheezing., Disp: 1 Inhaler, Rfl: 11  No current facility-administered medications for this encounter.     Date Last Reviewed:  9/25/2017   Number of Personal Factors/Comorbidities that affect the Plan of Care: 0: LOW COMPLEXITY   EXAMINATION:   Observation/Orthostatic Postural Assessment:          Patient denies any LE pain, paresthesia or symptoms. Patient denies any increase of symptoms with cough, sneeze or valsalva. Patient denies any saddle paresthesia or bowel/bladder deficits. Patient exhibits a decreased lumbar lordosis and slight increased thoracic kyphosis with convex right type I curve thoracic/lumbar spine. Lower extremity weight bearing is symmetrical. Shoulder symmetry exhibits bilateral protraction. Observation of gait indicates decreased pelvic mobility (patient is a hip walker versus an efficient trunk walker). Lower quadrant bony landmarks are right iliac crest elevated compared to left, level greater trochanter, left greater trochanter anterior compared to right. Leg swing for innominate mobility indicates decreased bilaterally innominate extension. Vertical compression test (VCT) for alignment is 3. Elbow flexion test (EFT) for motor activation is 3. Soft tissue observation indicates restrictions in right iliopsoas, QL, thoracic and lumbar spine paraspinals, and piriformis bilaterally. Palpation: Myofascial assessment indicates restrictions through thoracolumbar fascia and around sacrococcygeal junction. improving Muscle length testing in modified Victor Manuel position exhibits restricted right psoas and rectus femoris  improving. Hamstring flexibility tested supine with straight leg raise (SLR) is WFL. Piriformis length is restricted right greater than left. improving Quadriceps flexibility tested prone with heel to buttock is restricted right. Monica Certain test is positive right for rectus femoris tightness. Gastrocnemius and soleus flexibility are WFL. Sacrotuberous ligament is restricted right. Sacrococcygeal junction exhibits right rotated. Body of coccyx is flexed.   ROM: Passive trunk rotation is 75% available to the R and 80% available to the L. Kinetic testing in standing including forward bend test is positive left. Marcher's test is positive right. Pelvic shear test is standing exhibits decreased excursion of bilateral shear with a hard end feel. Single plane active movements through lumbar spine in standing exhibit decreased lumbar flexion, extension hinge at L3 and restrictions in right side bending. Lumbar positional testing at transverse processes indicates FRS left L4-5 with limitations in extension, rotation and side bending right improving. Prone knee active flexion test is positive right. Spring testing of lumbar spine exhibits decreased a/p mobilization at L4-5 and sacral base. improving  AROM (PROM) Right Left   Hip flexion/Innominate flexion 90 90   Hip extension/Innominate extension 5 5   Hip external rotation (ER) 20 20   Hip internal rotation (IR) 15 20   Strength: Lumbar protective mechanism (LPM) are not tested today for initiation. LPM Strength */5   R anterior to posterior diagonal 2   L anterior to posterior diagonal 2   R posterior to anterior diagonal 3   L posterior to anterior diagonal 3     Manual Muscle Test (*/5) Right Left   Knee extension 5 5   Knee flexion 5 5   Hip flexion 4 4   Hip ER 4 4   Hip IR 4 4   Hip extension 4- 4-   Hip abduction 3+ 3+   Hip adduction 5 5   Ankle DF 5 5   Ankle PF 5 5   Core stability 3+/5     Special Tests:  Manus Baba test is negative. Scours test is negative  Neurological Screen:  Myotomes: Key muscle strength testing through bilateral LE is intact. Dermatomes: Sensation testing through bilateral lower quadrants for light touch is intact. Reflexes: Patellar (L4) and Achilles (S1) are not tested today. Neural tension tests: Passive straight leg raise (SLR) test is negative. Slump test is negative. .  Functional Mobility:  Challenged with sit to stand transfers, ambulation and rotation movements through thoracic and lumbar spine. Body Structures Involved:  1. Nerves  2. Joints  3.  Muscles Body Functions Affected:  1. Sensory/Pain  2. Neuromusculoskeletal  3. Movement Related Activities and Participation Affected:  1. General Tasks and Demands  2. Mobility  3. Community, Social and [place]   Number of elements (examined above) that affect the Plan of Care: 3: MODERATE COMPLEXITY   CLINICAL PRESENTATION:   Presentation: Evolving clinical presentation with changing clinical characteristics: MODERATE COMPLEXITY   CLINICAL DECISION MAKING:   Outcome Measure: Tool Used: Modified Oswestry Low Back Pain Questionnaire  Score:  Initial: 29/50  Most Recent: 25/50 (Date: 9-5-17 )   Interpretation of Score: Each section is scored on a 0-5 scale, 5 representing the greatest disability. The scores of each section are added together for a total score of 50. Medical Necessity:   · Patient is expected to demonstrate progress in strength, range of motion, balance and coordination to increase independence with ADLs and ambulation without discomfort. .  Reason for Services/Other Comments:  · Patient continues to require skilled intervention due to increased pain with activity, challenged with daily tasks secondary to discomfort and limited mobility in lumbar spine and pelvis. .   Use of outcome tool(s) and clinical judgement create a POC that gives a: Questionable prediction of patient's progress: MODERATE COMPLEXITY            TREATMENT:   (In addition to Assessment/Re-Assessment sessions the following treatments were rendered)  Pre-treatment Symptoms/Complaints:  Patient notes increased discomfort in her right anterior hip and posterior right low back. Had imaging of her abdomen today, waiting for results. Pain: Initial: Pain Intensity 1: 5  Pain Location 1: Hip  Pain Orientation 1: Right  Post Session:  3/10     Therapeutic Exercise: (10 Minutes):  Exercises per grid below to improve mobility, strength, balance and coordination.   Required minimal verbal and manual cues to promote proper body alignment, promote proper body posture and promote proper body mechanics. Progressed resistance, range, repetitions and complexity of movement as indicated. Date:  9/25/2017   Activity/Exercise Parameters   Child pose X 5 reps, 10 sec holds   Abdominal bracing X 10, 5 sec holds  X 10 reps UE/LE, 5 sec holds with distraction   Postural education    Quadriped arch/sag X 5 reps, 10 sec holds   Lower trunk rotation multifidi activation X 5 reps, slow and controlled bilaterally. 1/2 prone rectus femoris stretch/hip flexors    Standing innominate swings X 1 minute   Lateral side steps X 5 reps, green band     Manual Therapy (    Soft Tissue Mobilization Duration  Duration: 50 Minutes): Manual techniques to facilitate improved motion and decreased pain. · Supine anterior right iliopsoas release with FMP of lower trunk rotation  · Supine right cecum soft tissue mobilization   · Prone hip in axis right hip, with FMP and NMR into hip ER/IR  · Prone left sacral mobilization p/a with FMP of left hip ER/IR followed with NMR  · Prone soft tissue mobilization around bony contours of iliac crest and sacral sulcus  · Side lying left for right QL soft tissue mobilization with pelvic PNF patterns of anterior elevation and posterior depression. (Used abbreviations: MET - muscle energy technique; PNF - proprioceptive neuromuscular facilitation; NMR - neuromuscular re-education; a/p - anterior to posterior; p/a - posterior to anterior, FMP - functional movement patterns)    Modalities (10 minutes): Moist heat applied to lumbar spine and anterior hip in supine position. Treatment/Session Assessment:    · Response to Treatment:  Improving right pelvis mobility, decreased soft tissue restrictions in anterior hip and posterior pelvis. · Compliance with Program/Exercises: compliant all of the time. · Recommendations/Intent for next treatment session: \"Next visit will focus on advancements to more challenging activities\".   Total Treatment Duration:  PT Patient Time In/Time Out  Time In: 1630  Time Out: [address] Zoey Bazan, PT

## 2017-10-03 ENCOUNTER — HOSPITAL ENCOUNTER (OUTPATIENT)
Dept: PHYSICAL THERAPY | Age: 52
Discharge: HOME | End: 2017-10-03
Payer: COMMERCIAL

## 2017-10-03 PROCEDURE — 97110 THERAPEUTIC EXERCISES: CPT

## 2017-10-03 PROCEDURE — 97140 MANUAL THERAPY 1/> REGIONS: CPT

## 2017-10-04 NOTE — PROGRESS NOTES
Judy Granados  : [facility] [address]  [phone]   [phone]        OUTPATIENT PHYSICAL THERAPY:Daily Note 10/3/2017    ICD-10: Treatment Diagnosis: low back pain M54.5  ICD-10: Treatment Diagnosis: muscle spasm of back M62.830    Precautions/Allergies:   Penicillin; Aciphex [rabeprazole]; and Augmentin [amoxicillin-pot clavulanate]   Fall Risk Score: 1 (? 5 = High Risk)  MD Orders: self referred MEDICAL/REFERRING DIAGNOSIS:  Low back pain [M54.5]   DATE OF ONSET: 2017 flare up  REFERRING PHYSICIAN: Steffi Hernandez 89: as needed     INITIAL ASSESSMENT:  Ms. Fang Harris is a 47 y.o. female who presents to physical therapy with chronic low back pain with a flare up in 2017. She demonstrates increased soft tissue restrictions along her right side of thoracic and lumbar spine and demonstrates limited ROM through thoracic and lumbar spine flexion, extension and rotation. She demonstrates arthrokinematic dysfunctions throughout her lumbar spine and pelvis, soft tissue restrictions through anterior pelvis/hip right greater than left, decreased activation of core stability and weakness through hips and pelvis. She would benefit from skilled physical therapy to improve overall mobility and function. 17: Patient has attended 4 physical therapy sessions, she has demonstrated improved mobility through pelvis and lumbar spine, however, continues to demonstrate joint and soft tissue restrictions, strength deficits and postural deficits. She would benefit from continued physical therapy to improve overall mobility and function with daily activities. PROBLEM LIST (Impacting functional limitations):  1. Decreased Strength  2. Decreased ADL/Functional Activities  3. Decreased Ambulation Ability/Technique  4. Increased Pain  5. Decreased Activity Tolerance  6.  Decreased Flexibility/Joint Mobility INTERVENTIONS PLANNED:  1. Home Exercise Program (HEP)  2. Manual Therapy  3. Neuromuscular Re-education/Strengthening  4. Range of Motion (ROM)  5. Therapeutic Exercise/Strengthening   TREATMENT PLAN:  Effective Dates:  9/5/17 TO 11/28/17. Frequency/Duration: 2x a week for 6 weeks and progress to 1x a week for 6 weeks. (Patient may only be seen 1x a week secondary to therapist availability.)  GOALS: (Goals have been discussed and agreed upon with patient.)    Discharge Goals: Time Frame: 12 weeks  1. Patient demonstrates independence with her HEP without verbal cueing from therapist. - GOAL MET  2. Patient able to ambulate for 30 minutes without onset of low back pain. - ONGOING  3. Patient able to perform sit to stand transfers from various surfaces without onset of low back pain. - ALMOST MET  4. Improve Oswestry outcome measure score from 29/50 to 10/50 to perform ADLs - ONGOING  Rehabilitation Potential For Stated Goals: Excellent  Regarding Brii Daly's therapy, I certify that the treatment plan above will be carried out by a therapist or under their direction. Thank you for this referral,  Olivia Acosta, PT     Referring Physician Signature: Sienna Pod, *              Date                    The information in this section was collected on 8/8/17 (except where otherwise noted). HISTORY:   History of Present Injury/Illness (Reason for Referral):  Chronic low back pain for years and she has been doing really well up until June 2017. Prior to the flare up she was exercising regularly, cycling and having little to no discomfort. Patient notes increased pain and stiffness with sit to stand transfers. She is challenged with bending and lifting, pain with walking and sitting. Past Medical History/Comorbidities: . Ms. Jatinder Branch  has a past medical history of Allergic rhinitis, cause unspecified; Esophageal reflux; Essential hypertension, benign;  Extrinsic asthma, unspecified; Generalized anxiety disorder; Hypothyroidism; and Unspecified sleep apnea. Ms. Noemi Mcgraw  has a past surgical history that includes cholecystectomy (1/07); back surgery; and gyn (12/10). Social History/Living Environment:     Lives in a private home with her , no physical barriers present in home setting  Prior Level of Function/Work/Activity:   Independent, currently challenged with sitting prolonged at work and unable to perform normal workout routine secondary to pain. Dominant Side:         RIGHT    Current Medications:       Current Outpatient Prescriptions:     lisinopril (PRINIVIL, ZESTRIL) 10 mg tablet, TAKE 1 TABLET BY MOUTH DAILY, Disp: 90 Tab, Rfl: 1    fluticasone-vilanterol (BREO ELLIPTA) 200-25 mcg/dose inhaler, Take 1 Puff by inhalation daily. , Disp: 1 Inhaler, Rfl: 5    montelukast (SINGULAIR) 10 mg tablet, TAKE ONE TABLET BY MOUTH EVERY DAY, Disp: 90 Tab, Rfl: 1    olopatadine (PATANOL) 0.1 % ophthalmic solution, Administer 2 Drops to both eyes two (2) times a day., Disp: 5 mL, Rfl: 5    B.infantis-B.ani-B.long-B.bifi (PROBIOTIC 4X) 10-15 mg TbEC, Take  by mouth., Disp: , Rfl:     omeprazole (PRILOSEC) 40 mg capsule, TAKE ONE CAPSULE BY MOUTH EVERY DAY, Disp: 30 Cap, Rfl: 5    PROAIR RESPICLICK 90 mcg/actuation aepb, INHALE ONE PUFF BY MOUTH EVERY 4 HOURS AS NEEDED, Disp: , Rfl: 5    fexofenadine (ALLEGRA) 180 mg tablet, Take  by mouth., Disp: , Rfl:     fluticasone (FLONASE) 50 mcg/actuation nasal spray, take 1 spray(s) intranasally once a day for 30 day(s), Disp: 1 Bottle, Rfl: 11    magnesium carbonate 54 mg/5 mL liqd, Take  by mouth., Disp: , Rfl:     albuterol (PROVENTIL HFA, VENTOLIN HFA, PROAIR HFA) 90 mcg/actuation inhaler, Take 2 Puffs by inhalation every four (4) hours as needed for Wheezing., Disp: 1 Inhaler, Rfl: 11   Date Last Reviewed:  10/3/2017   Number of Personal Factors/Comorbidities that affect the Plan of Care: 0: LOW COMPLEXITY   EXAMINATION:   Observation/Orthostatic Postural Assessment:          Patient denies any LE pain, paresthesia or symptoms. Patient denies any increase of symptoms with cough, sneeze or valsalva. Patient denies any saddle paresthesia or bowel/bladder deficits. Patient exhibits a decreased lumbar lordosis and slight increased thoracic kyphosis with convex right type I curve thoracic/lumbar spine. Lower extremity weight bearing is symmetrical. Shoulder symmetry exhibits bilateral protraction. Observation of gait indicates decreased pelvic mobility (patient is a hip walker versus an efficient trunk walker). Lower quadrant bony landmarks are right iliac crest elevated compared to left, level greater trochanter, left greater trochanter anterior compared to right. Leg swing for innominate mobility indicates decreased bilaterally innominate extension. Vertical compression test (VCT) for alignment is 3. Elbow flexion test (EFT) for motor activation is 3. Soft tissue observation indicates restrictions in right iliopsoas, QL, thoracic and lumbar spine paraspinals, and piriformis bilaterally. Palpation: Myofascial assessment indicates restrictions through thoracolumbar fascia and around sacrococcygeal junction. improving Muscle length testing in modified Victor Manuel position exhibits restricted right psoas and rectus femoris  improving. Hamstring flexibility tested supine with straight leg raise (SLR) is WFL. Piriformis length is restricted right greater than left. improving Quadriceps flexibility tested prone with heel to buttock is restricted right. Nancyann Sharon test is positive right for rectus femoris tightness. Gastrocnemius and soleus flexibility are WFL. Sacrotuberous ligament is restricted right. Sacrococcygeal junction exhibits right rotated. Body of coccyx is flexed. ROM: Passive trunk rotation is 75% available to the R and 80% available to the L. Kinetic testing in standing including forward bend test is positive left. Marcher's test is positive right.  Pelvic shear test is standing exhibits decreased excursion of bilateral shear with a hard end feel. Single plane active movements through lumbar spine in standing exhibit decreased lumbar flexion, extension hinge at L3 and restrictions in right side bending. Lumbar positional testing at transverse processes indicates FRS left L4-5 with limitations in extension, rotation and side bending right improving. Prone knee active flexion test is positive right. Spring testing of lumbar spine exhibits decreased a/p mobilization at L4-5 and sacral base. improving  AROM (PROM) Right Left   Hip flexion/Innominate flexion 90 90   Hip extension/Innominate extension 5 5   Hip external rotation (ER) 20 20   Hip internal rotation (IR) 15 20   Strength: Lumbar protective mechanism (LPM) are not tested today for initiation. LPM Strength */5   R anterior to posterior diagonal 2   L anterior to posterior diagonal 2   R posterior to anterior diagonal 3   L posterior to anterior diagonal 3     Manual Muscle Test (*/5) Right Left   Knee extension 5 5   Knee flexion 5 5   Hip flexion 4 4   Hip ER 4 4   Hip IR 4 4   Hip extension 4- 4-   Hip abduction 3+ 3+   Hip adduction 5 5   Ankle DF 5 5   Ankle PF 5 5   Core stability 3+/5     Special Tests:  Cresenciano Culp test is negative. Scours test is negative  Neurological Screen:  Myotomes: Key muscle strength testing through bilateral LE is intact. Dermatomes: Sensation testing through bilateral lower quadrants for light touch is intact. Reflexes: Patellar (L4) and Achilles (S1) are not tested today. Neural tension tests: Passive straight leg raise (SLR) test is negative. Slump test is negative. .  Functional Mobility:  Challenged with sit to stand transfers, ambulation and rotation movements through thoracic and lumbar spine. Body Structures Involved:  1. Nerves  2. Joints  3. Muscles Body Functions Affected:  1. Sensory/Pain  2. Neuromusculoskeletal  3.  Movement Related Activities and Participation Affected:  1. General Tasks and Demands  2. Mobility  3. Community, Social and [place]   Number of elements (examined above) that affect the Plan of Care: 3: MODERATE COMPLEXITY   CLINICAL PRESENTATION:   Presentation: Evolving clinical presentation with changing clinical characteristics: MODERATE COMPLEXITY   CLINICAL DECISION MAKING:   Outcome Measure: Tool Used: Modified Oswestry Low Back Pain Questionnaire  Score:  Initial: 29/50  Most Recent: 25/50 (Date: 9-5-17 )   Interpretation of Score: Each section is scored on a 0-5 scale, 5 representing the greatest disability. The scores of each section are added together for a total score of 50. Medical Necessity:   · Patient is expected to demonstrate progress in strength, range of motion, balance and coordination to increase independence with ADLs and ambulation without discomfort. .  Reason for Services/Other Comments:  · Patient continues to require skilled intervention due to increased pain with activity, challenged with daily tasks secondary to discomfort and limited mobility in lumbar spine and pelvis. .   Use of outcome tool(s) and clinical judgement create a POC that gives a: Questionable prediction of patient's progress: MODERATE COMPLEXITY            TREATMENT:   (In addition to Assessment/Re-Assessment sessions the following treatments were rendered)  Pre-treatment Symptoms/Complaints:  Patient notes feeling better overall since last PT session, notes less discomfort in right anterior hip, continues to have restrictions in right low lumbar spine. Pain: Initial: Pain Intensity 1: 2  Pain Location 1: Spine, lumbar  Pain Orientation 1: Right  Post Session:  1/10     Therapeutic Exercise: (10 Minutes):  Exercises per grid below to improve mobility, strength, balance and coordination. Required minimal verbal and manual cues to promote proper body alignment, promote proper body posture and promote proper body mechanics.   Progressed resistance, range, repetitions and complexity of movement as indicated. Date:  10/3/2017   Activity/Exercise Parameters   Child pose X 5 reps, 10 sec holds   Abdominal bracing X 10, 5 sec holds  X 10 reps UE/LE, 5 sec holds with distraction   Postural education    Quadriped arch/sag X 5 reps, 10 sec holds   Lower trunk rotation multifidi activation X 5 reps, slow and controlled bilaterally. 1/2 prone rectus femoris stretch/hip flexors    Standing innominate swings X 1 minute   Lateral side steps X 5 reps, green band     Manual Therapy (    Soft Tissue Mobilization Duration  Duration: 50 Minutes): Manual techniques to facilitate improved motion and decreased pain. · Supine anterior right iliopsoas release with FMP of lower trunk rotation  · Supine right cecum soft tissue mobilization   · Prone hip in axis right hip, with FMP and NMR into hip ER/IR  · Prone left sacral mobilization p/a with FMP of left hip ER/IR followed with NMR  · Prone soft tissue mobilization around bony contours of iliac crest and sacral sulcus  · Side lying left for right QL soft tissue mobilization with pelvic PNF patterns of anterior elevation and posterior depression. (Used abbreviations: MET - muscle energy technique; PNF - proprioceptive neuromuscular facilitation; NMR - neuromuscular re-education; a/p - anterior to posterior; p/a - posterior to anterior, FMP - functional movement patterns)      Treatment/Session Assessment:    · Response to Treatment:  Improved pelvic mobility, improving bilateral hip flexor flexibility. · Compliance with Program/Exercises: compliant all of the time. · Recommendations/Intent for next treatment session: \"Next visit will focus on advancements to more challenging activities\".   Total Treatment Duration:  PT Patient Time In/Time Out  Time In: 1030  Time Out: [address] Panchito Glaser, PT

## 2017-10-10 ENCOUNTER — HOSPITAL ENCOUNTER (OUTPATIENT)
Dept: PHYSICAL THERAPY | Age: 52
Discharge: HOME | End: 2017-10-10
Payer: COMMERCIAL

## 2017-10-10 PROCEDURE — 97110 THERAPEUTIC EXERCISES: CPT

## 2017-10-10 PROCEDURE — 97140 MANUAL THERAPY 1/> REGIONS: CPT

## 2017-10-11 ENCOUNTER — APPOINTMENT (RX ONLY)
Dept: URBAN - METROPOLITAN AREA CLINIC 349 | Facility: CLINIC | Age: 52
Setting detail: DERMATOLOGY
End: 2017-10-11

## 2017-10-11 DIAGNOSIS — I78.8 OTHER DISEASES OF CAPILLARIES: ICD-10-CM

## 2017-10-11 DIAGNOSIS — T07XXXA INSECT BITE, NONVENOMOUS, OF OTHER, MULTIPLE, AND UNSPECIFIED SITES, WITHOUT MENTION OF INFECTION: ICD-10-CM

## 2017-10-11 DIAGNOSIS — D22 MELANOCYTIC NEVI: ICD-10-CM | Status: STABLE

## 2017-10-11 DIAGNOSIS — Z87.2 PERSONAL HISTORY OF DISEASES OF THE SKIN AND SUBCUTANEOUS TISSUE: ICD-10-CM

## 2017-10-11 PROBLEM — D22.71 MELANOCYTIC NEVI OF RIGHT LOWER LIMB, INCLUDING HIP: Status: ACTIVE | Noted: 2017-10-11

## 2017-10-11 PROBLEM — D22.62 MELANOCYTIC NEVI OF LEFT UPPER LIMB, INCLUDING SHOULDER: Status: ACTIVE | Noted: 2017-10-11

## 2017-10-11 PROBLEM — S30.860A INSECT BITE (NONVENOMOUS) OF LOWER BACK AND PELVIS, INITIAL ENCOUNTER: Status: ACTIVE | Noted: 2017-10-11

## 2017-10-11 PROBLEM — D22.61 MELANOCYTIC NEVI OF RIGHT UPPER LIMB, INCLUDING SHOULDER: Status: ACTIVE | Noted: 2017-10-11

## 2017-10-11 PROBLEM — D22.72 MELANOCYTIC NEVI OF LEFT LOWER LIMB, INCLUDING HIP: Status: ACTIVE | Noted: 2017-10-11

## 2017-10-11 PROBLEM — D22.5 MELANOCYTIC NEVI OF TRUNK: Status: ACTIVE | Noted: 2017-10-11

## 2017-10-11 PROBLEM — S20.469A INSECT BITE (NONVENOMOUS) OF UNSPECIFIED BACK WALL OF THORAX, INITIAL ENCOUNTER: Status: ACTIVE | Noted: 2017-10-11

## 2017-10-11 PROCEDURE — ? COUNSELING

## 2017-10-11 PROCEDURE — 99213 OFFICE O/P EST LOW 20 MIN: CPT

## 2017-10-11 PROCEDURE — ? MEDICAL PHOTOGRAPHY REVIEW

## 2017-10-11 ASSESSMENT — LOCATION ZONE DERM
LOCATION ZONE: TRUNK
LOCATION ZONE: FACE
LOCATION ZONE: ARM
LOCATION ZONE: LEG

## 2017-10-11 ASSESSMENT — LOCATION SIMPLE DESCRIPTION DERM
LOCATION SIMPLE: RIGHT UPPER BACK
LOCATION SIMPLE: RIGHT POSTERIOR THIGH
LOCATION SIMPLE: LEFT POSTERIOR THIGH
LOCATION SIMPLE: LEFT POSTERIOR UPPER ARM
LOCATION SIMPLE: RIGHT BUTTOCK
LOCATION SIMPLE: LEFT THIGH
LOCATION SIMPLE: RIGHT LOWER BACK
LOCATION SIMPLE: RIGHT POSTERIOR UPPER ARM
LOCATION SIMPLE: ABDOMEN
LOCATION SIMPLE: RIGHT THIGH
LOCATION SIMPLE: LEFT CHEEK

## 2017-10-11 ASSESSMENT — LOCATION DETAILED DESCRIPTION DERM
LOCATION DETAILED: RIGHT INFERIOR MEDIAL UPPER BACK
LOCATION DETAILED: RIGHT PROXIMAL POSTERIOR UPPER ARM
LOCATION DETAILED: LEFT CENTRAL MALAR CHEEK
LOCATION DETAILED: RIGHT ANTERIOR PROXIMAL THIGH
LOCATION DETAILED: RIGHT INFERIOR MEDIAL MIDBACK
LOCATION DETAILED: RIGHT BUTTOCK
LOCATION DETAILED: RIGHT LATERAL ABDOMEN
LOCATION DETAILED: LEFT ANTERIOR PROXIMAL THIGH
LOCATION DETAILED: RIGHT PROXIMAL POSTERIOR THIGH
LOCATION DETAILED: LEFT DISTAL POSTERIOR THIGH
LOCATION DETAILED: LEFT PROXIMAL POSTERIOR UPPER ARM

## 2017-10-11 NOTE — PROCEDURE: MEDICAL PHOTOGRAPHY REVIEW
Number Of Photographs Reviewed: 7
Detail Level: Detailed
Review Findings: no new or changing lesions

## 2017-10-11 NOTE — PROGRESS NOTES
Arnold Gallardo  : [facility] [address]  [phone]   [phone]        OUTPATIENT PHYSICAL THERAPY:Daily Note 10/10/2017    ICD-10: Treatment Diagnosis: low back pain M54.5  ICD-10: Treatment Diagnosis: muscle spasm of back M62.830    Precautions/Allergies:   Penicillin; Aciphex [rabeprazole]; and Augmentin [amoxicillin-pot clavulanate]   Fall Risk Score: 1 (? 5 = High Risk)  MD Orders: self referred MEDICAL/REFERRING DIAGNOSIS:  Low back pain [M54.5]   DATE OF ONSET: 2017 flare up  REFERRING PHYSICIAN: Steffi Hernandez 89: as needed     INITIAL ASSESSMENT:  Ms. Africa Franklin is a 47 y.o. female who presents to physical therapy with chronic low back pain with a flare up in 2017. She demonstrates increased soft tissue restrictions along her right side of thoracic and lumbar spine and demonstrates limited ROM through thoracic and lumbar spine flexion, extension and rotation. She demonstrates arthrokinematic dysfunctions throughout her lumbar spine and pelvis, soft tissue restrictions through anterior pelvis/hip right greater than left, decreased activation of core stability and weakness through hips and pelvis. She would benefit from skilled physical therapy to improve overall mobility and function. 17: Patient has attended 4 physical therapy sessions, she has demonstrated improved mobility through pelvis and lumbar spine, however, continues to demonstrate joint and soft tissue restrictions, strength deficits and postural deficits. She would benefit from continued physical therapy to improve overall mobility and function with daily activities. PROBLEM LIST (Impacting functional limitations):  1. Decreased Strength  2. Decreased ADL/Functional Activities  3. Decreased Ambulation Ability/Technique  4. Increased Pain  5. Decreased Activity Tolerance  6.  Decreased Flexibility/Joint Mobility INTERVENTIONS PLANNED:  1. Home Exercise Program (HEP)  2. Manual Therapy  3. Neuromuscular Re-education/Strengthening  4. Range of Motion (ROM)  5. Therapeutic Exercise/Strengthening   TREATMENT PLAN:  Effective Dates:  9/5/17 TO 11/28/17. Frequency/Duration: 2x a week for 6 weeks and progress to 1x a week for 6 weeks. (Patient may only be seen 1x a week secondary to therapist availability.)  GOALS: (Goals have been discussed and agreed upon with patient.)    Discharge Goals: Time Frame: 12 weeks  1. Patient demonstrates independence with her HEP without verbal cueing from therapist. - GOAL MET  2. Patient able to ambulate for 30 minutes without onset of low back pain. - ONGOING  3. Patient able to perform sit to stand transfers from various surfaces without onset of low back pain. - ALMOST MET  4. Improve Oswestry outcome measure score from 29/50 to 10/50 to perform ADLs - ONGOING  Rehabilitation Potential For Stated Goals: Excellent              The information in this section was collected on 8/8/17 (except where otherwise noted). HISTORY:   History of Present Injury/Illness (Reason for Referral):  Chronic low back pain for years and she has been doing really well up until June 2017. Prior to the flare up she was exercising regularly, cycling and having little to no discomfort. Patient notes increased pain and stiffness with sit to stand transfers. She is challenged with bending and lifting, pain with walking and sitting. Past Medical History/Comorbidities: . Ms. Frida Parker  has a past medical history of Allergic rhinitis, cause unspecified; Esophageal reflux; Essential hypertension, benign; Extrinsic asthma, unspecified; Generalized anxiety disorder; Hypothyroidism; and Unspecified sleep apnea. Ms. Frida Parker  has a past surgical history that includes cholecystectomy (1/07); back surgery; and gyn (12/10).   Social History/Living Environment:     Lives in a private home with her , no physical barriers present in home setting  Prior Level of Function/Work/Activity:   Independent, currently challenged with sitting prolonged at work and unable to perform normal workout routine secondary to pain. Dominant Side:         RIGHT    Current Medications:       Current Outpatient Prescriptions:     lisinopril (PRINIVIL, ZESTRIL) 10 mg tablet, TAKE 1 TABLET BY MOUTH DAILY, Disp: 90 Tab, Rfl: 1    fluticasone-vilanterol (BREO ELLIPTA) 200-25 mcg/dose inhaler, Take 1 Puff by inhalation daily. , Disp: 1 Inhaler, Rfl: 5    montelukast (SINGULAIR) 10 mg tablet, TAKE ONE TABLET BY MOUTH EVERY DAY, Disp: 90 Tab, Rfl: 1    olopatadine (PATANOL) 0.1 % ophthalmic solution, Administer 2 Drops to both eyes two (2) times a day., Disp: 5 mL, Rfl: 5    B.infantis-B.ani-B.long-B.bifi (PROBIOTIC 4X) 10-15 mg TbEC, Take  by mouth., Disp: , Rfl:     omeprazole (PRILOSEC) 40 mg capsule, TAKE ONE CAPSULE BY MOUTH EVERY DAY, Disp: 30 Cap, Rfl: 5    PROAIR RESPICLICK 90 mcg/actuation aepb, INHALE ONE PUFF BY MOUTH EVERY 4 HOURS AS NEEDED, Disp: , Rfl: 5    fexofenadine (ALLEGRA) 180 mg tablet, Take  by mouth., Disp: , Rfl:     fluticasone (FLONASE) 50 mcg/actuation nasal spray, take 1 spray(s) intranasally once a day for 30 day(s), Disp: 1 Bottle, Rfl: 11    magnesium carbonate 54 mg/5 mL liqd, Take  by mouth., Disp: , Rfl:     albuterol (PROVENTIL HFA, VENTOLIN HFA, PROAIR HFA) 90 mcg/actuation inhaler, Take 2 Puffs by inhalation every four (4) hours as needed for Wheezing., Disp: 1 Inhaler, Rfl: 11   Date Last Reviewed:  10/10/2017   Number of Personal Factors/Comorbidities that affect the Plan of Care: 0: LOW COMPLEXITY   EXAMINATION:   Observation/Orthostatic Postural Assessment:          Patient denies any LE pain, paresthesia or symptoms. Patient denies any increase of symptoms with cough, sneeze or valsalva. Patient denies any saddle paresthesia or bowel/bladder deficits.     Patient exhibits a decreased lumbar lordosis and slight increased thoracic kyphosis with convex right type I curve thoracic/lumbar spine. Lower extremity weight bearing is symmetrical. Shoulder symmetry exhibits bilateral protraction. Observation of gait indicates decreased pelvic mobility (patient is a hip walker versus an efficient trunk walker). Lower quadrant bony landmarks are right iliac crest elevated compared to left, level greater trochanter, left greater trochanter anterior compared to right. Leg swing for innominate mobility indicates decreased bilaterally innominate extension. Vertical compression test (VCT) for alignment is 3. Elbow flexion test (EFT) for motor activation is 3. Soft tissue observation indicates restrictions in right iliopsoas, QL, thoracic and lumbar spine paraspinals, and piriformis bilaterally. Palpation: Myofascial assessment indicates restrictions through thoracolumbar fascia and around sacrococcygeal junction. improving Muscle length testing in modified Victor Manuel position exhibits restricted right psoas and rectus femoris  improving. Hamstring flexibility tested supine with straight leg raise (SLR) is WFL. Piriformis length is restricted right greater than left. improving Quadriceps flexibility tested prone with heel to buttock is restricted right. Franca Ananda test is positive right for rectus femoris tightness. Gastrocnemius and soleus flexibility are WFL. Sacrotuberous ligament is restricted right. Sacrococcygeal junction exhibits right rotated. Body of coccyx is flexed. ROM: Passive trunk rotation is 75% available to the R and 80% available to the L. Kinetic testing in standing including forward bend test is positive left. Marcher's test is positive right. Pelvic shear test is standing exhibits decreased excursion of bilateral shear with a hard end feel. Single plane active movements through lumbar spine in standing exhibit decreased lumbar flexion, extension hinge at L3 and restrictions in right side bending.  Lumbar positional testing at transverse processes indicates FRS left L4-5 with limitations in extension, rotation and side bending right improving. Prone knee active flexion test is positive right. Spring testing of lumbar spine exhibits decreased a/p mobilization at L4-5 and sacral base. improving  AROM (PROM) Right Left   Hip flexion/Innominate flexion 90 90   Hip extension/Innominate extension 5 5   Hip external rotation (ER) 20 20   Hip internal rotation (IR) 15 20   Strength: Lumbar protective mechanism (LPM) are not tested today for initiation. LPM Strength */5   R anterior to posterior diagonal 2   L anterior to posterior diagonal 2   R posterior to anterior diagonal 3   L posterior to anterior diagonal 3     Manual Muscle Test (*/5) Right Left   Knee extension 5 5   Knee flexion 5 5   Hip flexion 4 4   Hip ER 4 4   Hip IR 4 4   Hip extension 4- 4-   Hip abduction 3+ 3+   Hip adduction 5 5   Ankle DF 5 5   Ankle PF 5 5   Core stability 3+/5     Special Tests:  Gonzella Running test is negative. Scours test is negative  Neurological Screen:  Myotomes: Key muscle strength testing through bilateral LE is intact. Dermatomes: Sensation testing through bilateral lower quadrants for light touch is intact. Reflexes: Patellar (L4) and Achilles (S1) are not tested today. Neural tension tests: Passive straight leg raise (SLR) test is negative. Slump test is negative. .  Functional Mobility:  Challenged with sit to stand transfers, ambulation and rotation movements through thoracic and lumbar spine. Body Structures Involved:  1. Nerves  2. Joints  3. Muscles Body Functions Affected:  1. Sensory/Pain  2. Neuromusculoskeletal  3. Movement Related Activities and Participation Affected:  1. General Tasks and Demands  2. Mobility  3.  Community, Social and [place]   Number of elements (examined above) that affect the Plan of Care: 3: MODERATE COMPLEXITY   CLINICAL PRESENTATION:   Presentation: Evolving clinical presentation with changing clinical characteristics: MODERATE COMPLEXITY   CLINICAL DECISION MAKING:   Outcome Measure: Tool Used: Modified Oswestry Low Back Pain Questionnaire  Score:  Initial: 29/50  Most Recent: 25/50 (Date: 9-5-17 )   Interpretation of Score: Each section is scored on a 0-5 scale, 5 representing the greatest disability. The scores of each section are added together for a total score of 50. Medical Necessity:   · Patient is expected to demonstrate progress in strength, range of motion, balance and coordination to increase independence with ADLs and ambulation without discomfort. .  Reason for Services/Other Comments:  · Patient continues to require skilled intervention due to increased pain with activity, challenged with daily tasks secondary to discomfort and limited mobility in lumbar spine and pelvis. .   Use of outcome tool(s) and clinical judgement create a POC that gives a: Questionable prediction of patient's progress: MODERATE COMPLEXITY            TREATMENT:   (In addition to Assessment/Re-Assessment sessions the following treatments were rendered)  Pre-treatment Symptoms/Complaints:  Patient notes less discomfort noted over the past week. Continues to note slight low back stiffness, especially in AM.   Pain: Initial: Pain Intensity 1: 2  Pain Location 1: Spine, lumbar  Pain Orientation 1: Right  Post Session:  1/10     Therapeutic Exercise: (10 Minutes):  Exercises per grid below to improve mobility, strength, balance and coordination. Required minimal verbal and manual cues to promote proper body alignment, promote proper body posture and promote proper body mechanics. Progressed resistance, range, repetitions and complexity of movement as indicated.    Date:  10/10/2017   Activity/Exercise Parameters   Child pose X 5 reps, 10 sec holds   Abdominal bracing X 10, 5 sec holds  X 10 reps UE/LE, 5 sec holds with distraction   Postural education    Quadriped arch/sag X 5 reps, 10 sec holds   Lower trunk rotation multifidi activation X 5 reps, slow and controlled bilaterally. 1/2 prone rectus femoris stretch/hip flexors    Standing innominate swings    Lateral side steps    Supine hip ER stretch with block X 5 reps, 15 sec holds     Manual Therapy (    Soft Tissue Mobilization Duration  Duration: 50 Minutes): Manual techniques to facilitate improved motion and decreased pain. · Supine anterior right iliopsoas release with FMP of lower trunk rotation  · Supine right cecum soft tissue mobilization   · Prone hip in axis right hip, with FMP and NMR into hip ER/IR  · Prone left sacral mobilization p/a with FMP of left hip ER/IR followed with NMR  · Prone soft tissue mobilization around bony contours of iliac crest and sacral sulcus  · Side lying left for right QL soft tissue mobilization with pelvic PNF patterns of anterior elevation and posterior depression. (Used abbreviations: MET - muscle energy technique; PNF - proprioceptive neuromuscular facilitation; NMR - neuromuscular re-education; a/p - anterior to posterior; p/a - posterior to anterior, FMP - functional movement patterns)      Treatment/Session Assessment:    · Response to Treatment: decreased soft tissue restrictions in right anterior hip and lateral hip  · Compliance with Program/Exercises: compliant all of the time. · Recommendations/Intent for next treatment session: \"Next visit will focus on advancements to more challenging activities\".   Total Treatment Duration:  PT Patient Time In/Time Out  Time In: 1330  Time Out: [address] [place] [address] Lexy Parker PT

## 2017-10-17 ENCOUNTER — HOSPITAL ENCOUNTER (OUTPATIENT)
Dept: PHYSICAL THERAPY | Age: 52
Discharge: HOME | End: 2017-10-17
Payer: COMMERCIAL

## 2017-10-17 PROCEDURE — 97110 THERAPEUTIC EXERCISES: CPT

## 2017-10-17 PROCEDURE — 97140 MANUAL THERAPY 1/> REGIONS: CPT

## 2017-10-18 NOTE — PROGRESS NOTES
Julita Hillman  : 1965 [facility] [address]  [phone]   [phone]        OUTPATIENT PHYSICAL THERAPY:Daily Note and Progress Report 10/17/2017    ICD-10: Treatment Diagnosis: low back pain M54.5  ICD-10: Treatment Diagnosis: muscle spasm of back M62.830    Precautions/Allergies:   Penicillin; Aciphex [rabeprazole]; and Augmentin [amoxicillin-pot clavulanate]   Fall Risk Score: 1 (? 5 = High Risk)  MD Orders: self referred MEDICAL/REFERRING DIAGNOSIS:  Low back pain [M54.5]   DATE OF ONSET: 2017 flare up  REFERRING PHYSICIAN: Steffi Hernandez 89: as needed     INITIAL ASSESSMENT:  Ms. Wander Farfan is a 47 y.o. female who presents to physical therapy with chronic low back pain with a flare up in 2017. She demonstrates increased soft tissue restrictions along her right side of thoracic and lumbar spine and demonstrates limited ROM through thoracic and lumbar spine flexion, extension and rotation. She demonstrates arthrokinematic dysfunctions throughout her lumbar spine and pelvis, soft tissue restrictions through anterior pelvis/hip right greater than left, decreased activation of core stability and weakness through hips and pelvis. She would benefit from skilled physical therapy to improve overall mobility and function. 17: Patient has demonstrated improved mobility through pelvis and lumbar spine, however, continues to demonstrate joint and soft tissue restrictions, strength deficits and postural deficits. She would benefit from continued physical therapy to improve overall mobility and function with daily activities. 10/17/17: patient has demonstrated improved mobility through right hip and pelvis, decreased restrictions through lumbar spine.  Continues to have good and bad days, however challenged with prolonged weight bearing activities and ambulation secondary to continued low back pain and right anterior hip. She would benefit from continued therapy services to improve overall mobility and function. PROBLEM LIST (Impacting functional limitations):  1. Decreased Strength  2. Decreased ADL/Functional Activities  3. Decreased Ambulation Ability/Technique  4. Increased Pain  5. Decreased Activity Tolerance  6. Decreased Flexibility/Joint Mobility INTERVENTIONS PLANNED:  1. Home Exercise Program (HEP)  2. Manual Therapy  3. Neuromuscular Re-education/Strengthening  4. Range of Motion (ROM)  5. Therapeutic Exercise/Strengthening   TREATMENT PLAN:  Effective Dates:  9/5/17 TO 11/28/17. Frequency/Duration: 2x a week for 6 weeks and progress to 1x a week for 6 weeks. (Patient may only be seen 1x a week secondary to therapist availability.)  GOALS: (Goals have been discussed and agreed upon with patient.)    Discharge Goals: Time Frame: 12 weeks  1. Patient demonstrates independence with her HEP without verbal cueing from therapist. - GOAL MET  2. Patient able to ambulate for 30 minutes without onset of low back pain. - ONGOING  3. Patient able to perform sit to stand transfers from various surfaces without onset of low back pain. - ALMOST MET  4. Improve Oswestry outcome measure score from 29/50 to 10/50 to perform ADLs - ONGOING  Rehabilitation Potential For Stated Goals: Excellent              The information in this section was collected on 8/8/17 (except where otherwise noted). HISTORY:   History of Present Injury/Illness (Reason for Referral):  Chronic low back pain for years and she has been doing really well up until June 2017. Prior to the flare up she was exercising regularly, cycling and having little to no discomfort. Patient notes increased pain and stiffness with sit to stand transfers. She is challenged with bending and lifting, pain with walking and sitting. Past Medical History/Comorbidities: . Ms. Candi Agarwal  has a past medical history of Allergic rhinitis, cause unspecified; Esophageal reflux; Essential hypertension, benign; Extrinsic asthma, unspecified; Generalized anxiety disorder; Hypothyroidism; and Unspecified sleep apnea. Ms. Alf Winter  has a past surgical history that includes cholecystectomy (1/07); back surgery; and gyn (12/10). Social History/Living Environment:     Lives in a private home with her , no physical barriers present in home setting  Prior Level of Function/Work/Activity:   Independent, currently challenged with sitting prolonged at work and unable to perform normal workout routine secondary to pain. Dominant Side:         RIGHT    Current Medications:       Current Outpatient Prescriptions:     lisinopril (PRINIVIL, ZESTRIL) 10 mg tablet, TAKE 1 TABLET BY MOUTH DAILY, Disp: 90 Tab, Rfl: 1    fluticasone-vilanterol (BREO ELLIPTA) 200-25 mcg/dose inhaler, Take 1 Puff by inhalation daily. , Disp: 1 Inhaler, Rfl: 5    montelukast (SINGULAIR) 10 mg tablet, TAKE ONE TABLET BY MOUTH EVERY DAY, Disp: 90 Tab, Rfl: 1    olopatadine (PATANOL) 0.1 % ophthalmic solution, Administer 2 Drops to both eyes two (2) times a day., Disp: 5 mL, Rfl: 5    B.infantis-B.ani-B.long-B.bifi (PROBIOTIC 4X) 10-15 mg TbEC, Take  by mouth., Disp: , Rfl:     omeprazole (PRILOSEC) 40 mg capsule, TAKE ONE CAPSULE BY MOUTH EVERY DAY, Disp: 30 Cap, Rfl: 5    PROAIR RESPICLICK 90 mcg/actuation aepb, INHALE ONE PUFF BY MOUTH EVERY 4 HOURS AS NEEDED, Disp: , Rfl: 5    fexofenadine (ALLEGRA) 180 mg tablet, Take  by mouth., Disp: , Rfl:     fluticasone (FLONASE) 50 mcg/actuation nasal spray, take 1 spray(s) intranasally once a day for 30 day(s), Disp: 1 Bottle, Rfl: 11    magnesium carbonate 54 mg/5 mL liqd, Take  by mouth., Disp: , Rfl:     albuterol (PROVENTIL HFA, VENTOLIN HFA, PROAIR HFA) 90 mcg/actuation inhaler, Take 2 Puffs by inhalation every four (4) hours as needed for Wheezing., Disp: 1 Inhaler, Rfl: 11   Date Last Reviewed:  10/17/2017   Number of Personal Factors/Comorbidities that affect the Plan of Care: 0: LOW COMPLEXITY   EXAMINATION:   Observation/Orthostatic Postural Assessment:          Patient denies any LE pain, paresthesia or symptoms. Patient denies any increase of symptoms with cough, sneeze or valsalva. Patient denies any saddle paresthesia or bowel/bladder deficits. Patient exhibits a decreased lumbar lordosis and slight increased thoracic kyphosis with convex right type I curve thoracic/lumbar spine. Lower extremity weight bearing is symmetrical. Shoulder symmetry exhibits bilateral protraction. Observation of gait indicates decreased pelvic mobility (patient is a hip walker versus an efficient trunk walker). Lower quadrant bony landmarks are right iliac crest elevated compared to left, level greater trochanter, left greater trochanter anterior compared to right. Leg swing for innominate mobility indicates decreased bilaterally innominate extension. Vertical compression test (VCT) for alignment is 3. Elbow flexion test (EFT) for motor activation is 3. Soft tissue observation indicates restrictions in right iliopsoas, QL, thoracic and lumbar spine paraspinals, and piriformis bilaterally. Palpation: Myofascial assessment indicates restrictions through thoracolumbar fascia and around sacrococcygeal junction. improving Muscle length testing in modified Victor Manuel position exhibits restricted right psoas and rectus femoris  improving. Hamstring flexibility tested supine with straight leg raise (SLR) is WFL. Piriformis length is restricted right greater than left. improving Quadriceps flexibility tested prone with heel to buttock is restricted right. Nancyann Sharon test is positive right for rectus femoris tightness. Gastrocnemius and soleus flexibility are WFL. Sacrotuberous ligament is restricted right. Sacrococcygeal junction exhibits right rotated. Body of coccyx is flexed.   improved  ROM: Passive trunk rotation is 80% available to the R and 85% available to the L. Kinetic testing in standing including forward bend test is positive left. Marcher's test is positive right. Pelvic shear test is standing exhibits decreased excursion of bilateral shear with a hard end feel. Single plane active movements through lumbar spine in standing exhibit decreased lumbar flexion, extension hinge at L3 and restrictions in right side bending. Lumbar positional testing at transverse processes indicates FRS left L4-5 with limitations in extension, rotation and side bending right improving. Prone knee active flexion test is positive right. Spring testing of lumbar spine exhibits decreased a/p mobilization at L4-5 and sacral base. improving  AROM (PROM) Right Left   Hip flexion/Innominate flexion 95 100   Hip extension/Innominate extension 5 5   Hip external rotation (ER) 20 20   Hip internal rotation (IR) 15 20   Strength: Lumbar protective mechanism (LPM) are not tested today for initiation. LPM Strength */5   R anterior to posterior diagonal 3   L anterior to posterior diagonal 3   R posterior to anterior diagonal 3+   L posterior to anterior diagonal 3+     Manual Muscle Test (*/5) Right Left   Knee extension 5 5   Knee flexion 5 5   Hip flexion 4 4   Hip ER 4 4   Hip IR 4 4   Hip extension 4- 4-   Hip abduction 3+ 3+   Hip adduction 5 5   Ankle DF 5 5   Ankle PF 5 5   Core stability 4-/5     Special Tests:  Lucy Brain test is negative. Scours test is negative  Neurological Screen:  Myotomes: Key muscle strength testing through bilateral LE is intact. Dermatomes: Sensation testing through bilateral lower quadrants for light touch is intact. Reflexes: Patellar (L4) and Achilles (S1) are not tested today. Neural tension tests: Passive straight leg raise (SLR) test is negative. Slump test is negative. .  Functional Mobility:  Challenged with sit to stand transfers, ambulation and rotation movements through thoracic and lumbar spine. Body Structures Involved:  1. Nerves  2. Joints  3.  Muscles Body Functions Affected:  1. Sensory/Pain  2. Neuromusculoskeletal  3. Movement Related Activities and Participation Affected:  1. General Tasks and Demands  2. Mobility  3. Community, Social and [place]   Number of elements (examined above) that affect the Plan of Care: 3: MODERATE COMPLEXITY   CLINICAL PRESENTATION:   Presentation: Evolving clinical presentation with changing clinical characteristics: MODERATE COMPLEXITY   CLINICAL DECISION MAKING:   Outcome Measure: Tool Used: Modified Oswestry Low Back Pain Questionnaire  Score:  Initial: 29/50  Most Recent: 25/50 (Date: 9-5-17 )                       24/50 (date: 10-17-17)   Interpretation of Score: Each section is scored on a 0-5 scale, 5 representing the greatest disability. The scores of each section are added together for a total score of 50. Medical Necessity:   · Patient is expected to demonstrate progress in strength, range of motion, balance and coordination to increase independence with ADLs and ambulation without discomfort. .  Reason for Services/Other Comments:  · Patient continues to require skilled intervention due to increased pain with activity, challenged with daily tasks secondary to discomfort and limited mobility in lumbar spine and pelvis. .   Use of outcome tool(s) and clinical judgement create a POC that gives a: Questionable prediction of patient's progress: MODERATE COMPLEXITY            TREATMENT:   (In addition to Assessment/Re-Assessment sessions the following treatments were rendered)  Pre-treatment Symptoms/Complaints:  Patient notes increased pain in bilateral posterior thighs over the past three days. Notes she tried to go downtown this past weekend and walk around a festival and had to stop secondary to pain in lumbar spine and right pelvis.    Pain: Initial: Pain Intensity 1: 4  Pain Location 1: Spine, lumbar  Pain Orientation 1: Right  Post Session:  2/10     Therapeutic Exercise: (15 Minutes):  Exercises per grid below to improve mobility, strength, balance and coordination. Required minimal verbal and manual cues to promote proper body alignment, promote proper body posture and promote proper body mechanics. Progressed resistance, range, repetitions and complexity of movement as indicated. Date:  10/17/2017   Activity/Exercise Parameters   Child pose X 5 reps, 10 sec holds   Abdominal bracing X 10, 5 sec holds  X 10 reps UE/LE, 5 sec holds with distraction   Postural education    Quadriped arch/sag X 5 reps, 10 sec holds   Lower trunk rotation multifidi activation X 5 reps, slow and controlled bilaterally. 1/2 prone rectus femoris stretch/hip flexors    Standing innominate swings    Lateral side steps    Supine hip ER stretch with block X 5 reps, 15 sec holds   Supine hip release X 30 seconds each position, 4 part     Manual Therapy (    Soft Tissue Mobilization Duration  Duration: 45 Minutes): Manual techniques to facilitate improved motion and decreased pain. · Supine anterior right iliopsoas release with FMP of lower trunk rotation  · Supine right cecum soft tissue mobilization   · Prone hip in axis right hip, with FMP and NMR into hip ER/IR  · Prone left sacral mobilization p/a with FMP of left hip ER/IR followed with NMR  · Prone soft tissue mobilization around bony contours of iliac crest and sacral sulcus  · Side lying left for right QL soft tissue mobilization with pelvic PNF patterns of anterior elevation and posterior depression. (Used abbreviations: MET - muscle energy technique; PNF - proprioceptive neuromuscular facilitation; NMR - neuromuscular re-education; a/p - anterior to posterior; p/a - posterior to anterior, FMP - functional movement patterns)      Treatment/Session Assessment:    · Response to Treatment: improved awareness of dissociation of hip, pelvis and lumbar spine mobility, decreased restrictions noted in right anterior hip and pelvis.    · Compliance with Program/Exercises: compliant all of the time. · Recommendations/Intent for next treatment session: \"Next visit will focus on advancements to more challenging activities\".   Total Treatment Duration:  PT Patient Time In/Time Out  Time In: 1030  Time Out: [address] Maria Luz Nieto PT

## 2017-11-01 ENCOUNTER — HOSPITAL ENCOUNTER (OUTPATIENT)
Dept: PHYSICAL THERAPY | Age: 52
Discharge: HOME | End: 2017-11-01
Payer: COMMERCIAL

## 2017-11-01 NOTE — PROGRESS NOTES
Branden Donaldson   [phone]) [phone]  at  [facility] [address] [facility]  [phone]  [phone]             DATE: 2017    Patient canceled for appointment today due to work conflict. Will plan to follow up on next scheduled visit.     Alberto Fisher PT, DPT, CFMT

## 2017-11-06 PROBLEM — M54.50 CHRONIC LOW BACK PAIN: Status: ACTIVE | Noted: 2017-11-06

## 2017-11-06 PROBLEM — G89.29 CHRONIC LOW BACK PAIN: Status: ACTIVE | Noted: 2017-11-06

## 2017-11-14 ENCOUNTER — HOSPITAL ENCOUNTER (OUTPATIENT)
Dept: PHYSICAL THERAPY | Age: 52
Discharge: HOME | End: 2017-11-14
Payer: COMMERCIAL

## 2017-11-14 PROCEDURE — 97140 MANUAL THERAPY 1/> REGIONS: CPT

## 2017-11-14 PROCEDURE — 97110 THERAPEUTIC EXERCISES: CPT

## 2017-11-14 NOTE — PROGRESS NOTES
Janet Nascimento  : [facility] [address]  [phone]   [phone]        OUTPATIENT PHYSICAL THERAPY:Daily Note 2017    ICD-10: Treatment Diagnosis: low back pain M54.5  ICD-10: Treatment Diagnosis: muscle spasm of back M62.830    Precautions/Allergies:   Penicillin; Aciphex [rabeprazole]; and Augmentin [amoxicillin-pot clavulanate]   Fall Risk Score: 1 (? 5 = High Risk)  MD Orders: self referred MEDICAL/REFERRING DIAGNOSIS:  Low back pain [M54.5]   DATE OF ONSET: 2017 flare up  REFERRING PHYSICIAN: Steffi Hernandez 89: as needed     INITIAL ASSESSMENT:  Ms. Kenton Chavez is a 47 y.o. female who presents to physical therapy with chronic low back pain with a flare up in 2017. She demonstrates increased soft tissue restrictions along her right side of thoracic and lumbar spine and demonstrates limited ROM through thoracic and lumbar spine flexion, extension and rotation. She demonstrates arthrokinematic dysfunctions throughout her lumbar spine and pelvis, soft tissue restrictions through anterior pelvis/hip right greater than left, decreased activation of core stability and weakness through hips and pelvis. She would benefit from skilled physical therapy to improve overall mobility and function. 17: Patient has demonstrated improved mobility through pelvis and lumbar spine, however, continues to demonstrate joint and soft tissue restrictions, strength deficits and postural deficits. She would benefit from continued physical therapy to improve overall mobility and function with daily activities. 10/17/17: patient has demonstrated improved mobility through right hip and pelvis, decreased restrictions through lumbar spine.  Continues to have good and bad days, however challenged with prolonged weight bearing activities and ambulation secondary to continued low back pain and right anterior hip. She would benefit from continued therapy services to improve overall mobility and function. PROBLEM LIST (Impacting functional limitations):  1. Decreased Strength  2. Decreased ADL/Functional Activities  3. Decreased Ambulation Ability/Technique  4. Increased Pain  5. Decreased Activity Tolerance  6. Decreased Flexibility/Joint Mobility INTERVENTIONS PLANNED:  1. Home Exercise Program (HEP)  2. Manual Therapy  3. Neuromuscular Re-education/Strengthening  4. Range of Motion (ROM)  5. Therapeutic Exercise/Strengthening   TREATMENT PLAN:  Effective Dates:  9/5/17 TO 11/28/17. Frequency/Duration: 2x a week for 6 weeks and progress to 1x a week for 6 weeks. (Patient will be seen every other week)  GOALS: (Goals have been discussed and agreed upon with patient.)    Discharge Goals: Time Frame: 12 weeks  1. Patient demonstrates independence with her HEP without verbal cueing from therapist. - GOAL MET  2. Patient able to ambulate for 30 minutes without onset of low back pain. - ONGOING  3. Patient able to perform sit to stand transfers from various surfaces without onset of low back pain. - ALMOST MET  4. Improve Oswestry outcome measure score from 29/50 to 10/50 to perform ADLs - ONGOING  Rehabilitation Potential For Stated Goals: Excellent              The information in this section was collected on 8/8/17 (except where otherwise noted). HISTORY:   History of Present Injury/Illness (Reason for Referral):  Chronic low back pain for years and she has been doing really well up until June 2017. Prior to the flare up she was exercising regularly, cycling and having little to no discomfort. Patient notes increased pain and stiffness with sit to stand transfers. She is challenged with bending and lifting, pain with walking and sitting. Past Medical History/Comorbidities: . Ms. Shanda Crocker  has a past medical history of Allergic rhinitis, cause unspecified; Esophageal reflux; Essential hypertension, benign; Extrinsic asthma, unspecified; Generalized anxiety disorder; Hypothyroidism; and Unspecified sleep apnea. Ms. Mariia Johnson  has a past surgical history that includes cholecystectomy (1/07); back surgery; and gyn (12/10). Social History/Living Environment:     Lives in a private home with her , no physical barriers present in home setting  Prior Level of Function/Work/Activity:   Independent, currently challenged with sitting prolonged at work and unable to perform normal workout routine secondary to pain. Dominant Side:         RIGHT    Current Medications:       Current Outpatient Prescriptions:     meloxicam (MOBIC) 15 mg tablet, Take 1 Tab by mouth daily. , Disp: 30 Tab, Rfl: 1    lisinopril (PRINIVIL, ZESTRIL) 10 mg tablet, TAKE 1 TABLET BY MOUTH DAILY, Disp: 90 Tab, Rfl: 1    fluticasone-vilanterol (BREO ELLIPTA) 200-25 mcg/dose inhaler, Take 1 Puff by inhalation daily. , Disp: 1 Inhaler, Rfl: 5    montelukast (SINGULAIR) 10 mg tablet, TAKE ONE TABLET BY MOUTH EVERY DAY, Disp: 90 Tab, Rfl: 1    olopatadine (PATANOL) 0.1 % ophthalmic solution, Administer 2 Drops to both eyes two (2) times a day., Disp: 5 mL, Rfl: 5    B.infantis-B.ani-B.long-B.bifi (PROBIOTIC 4X) 10-15 mg TbEC, Take  by mouth., Disp: , Rfl:     omeprazole (PRILOSEC) 40 mg capsule, TAKE ONE CAPSULE BY MOUTH EVERY DAY, Disp: 30 Cap, Rfl: 5    PROAIR RESPICLICK 90 mcg/actuation aepb, INHALE ONE PUFF BY MOUTH EVERY 4 HOURS AS NEEDED, Disp: , Rfl: 5    fexofenadine (ALLEGRA) 180 mg tablet, Take  by mouth., Disp: , Rfl:     fluticasone (FLONASE) 50 mcg/actuation nasal spray, take 1 spray(s) intranasally once a day for 30 day(s), Disp: 1 Bottle, Rfl: 11    magnesium carbonate 54 mg/5 mL liqd, Take  by mouth., Disp: , Rfl:     albuterol (PROVENTIL HFA, VENTOLIN HFA, PROAIR HFA) 90 mcg/actuation inhaler, Take 2 Puffs by inhalation every four (4) hours as needed for Wheezing., Disp: 1 Inhaler, Rfl: 11   Date Last Reviewed:  11/14/2017   Number of Personal Factors/Comorbidities that affect the Plan of Care: 0: LOW COMPLEXITY   EXAMINATION:   Observation/Orthostatic Postural Assessment:          Patient denies any LE pain, paresthesia or symptoms. Patient denies any increase of symptoms with cough, sneeze or valsalva. Patient denies any saddle paresthesia or bowel/bladder deficits. Patient exhibits a decreased lumbar lordosis and slight increased thoracic kyphosis with convex right type I curve thoracic/lumbar spine. Lower extremity weight bearing is symmetrical. Shoulder symmetry exhibits bilateral protraction. Observation of gait indicates decreased pelvic mobility (patient is a hip walker versus an efficient trunk walker). Lower quadrant bony landmarks are right iliac crest elevated compared to left, level greater trochanter, left greater trochanter anterior compared to right. Leg swing for innominate mobility indicates decreased bilaterally innominate extension. Vertical compression test (VCT) for alignment is 3. Elbow flexion test (EFT) for motor activation is 3. Soft tissue observation indicates restrictions in right iliopsoas, QL, thoracic and lumbar spine paraspinals, and piriformis bilaterally. Palpation: Myofascial assessment indicates restrictions through thoracolumbar fascia and around sacrococcygeal junction. improving Muscle length testing in modified Victor Manuel position exhibits restricted right psoas and rectus femoris  improving. Hamstring flexibility tested supine with straight leg raise (SLR) is WFL. Piriformis length is restricted right greater than left. improving Quadriceps flexibility tested prone with heel to buttock is restricted right. Jesica Magalie test is positive right for rectus femoris tightness. Gastrocnemius and soleus flexibility are WFL. Sacrotuberous ligament is restricted right. Sacrococcygeal junction exhibits right rotated. Body of coccyx is flexed.   improved  ROM: Passive trunk rotation is 80% available to the R and 85% available to the L. Kinetic testing in standing including forward bend test is positive left. Marcher's test is positive right. Pelvic shear test is standing exhibits decreased excursion of bilateral shear with a hard end feel. Single plane active movements through lumbar spine in standing exhibit decreased lumbar flexion, extension hinge at L3 and restrictions in right side bending. Lumbar positional testing at transverse processes indicates FRS left L4-5 with limitations in extension, rotation and side bending right improving. Prone knee active flexion test is positive right. Spring testing of lumbar spine exhibits decreased a/p mobilization at L4-5 and sacral base. improving  AROM (PROM) Right Left   Hip flexion/Innominate flexion 95 100   Hip extension/Innominate extension 5 5   Hip external rotation (ER) 20 20   Hip internal rotation (IR) 15 20   Strength: Lumbar protective mechanism (LPM) are not tested today for initiation. LPM Strength */5   R anterior to posterior diagonal 3   L anterior to posterior diagonal 3   R posterior to anterior diagonal 3+   L posterior to anterior diagonal 3+     Manual Muscle Test (*/5) Right Left   Knee extension 5 5   Knee flexion 5 5   Hip flexion 4 4   Hip ER 4 4   Hip IR 4 4   Hip extension 4- 4-   Hip abduction 3+ 3+   Hip adduction 5 5   Ankle DF 5 5   Ankle PF 5 5   Core stability 4-/5     Special Tests:  Dorys Annita test is negative. Scours test is negative  Neurological Screen:  Myotomes: Key muscle strength testing through bilateral LE is intact. Dermatomes: Sensation testing through bilateral lower quadrants for light touch is intact. Reflexes: Patellar (L4) and Achilles (S1) are not tested today. Neural tension tests: Passive straight leg raise (SLR) test is negative. Slump test is negative. .  Functional Mobility:  Challenged with sit to stand transfers, ambulation and rotation movements through thoracic and lumbar spine.          Body Structures Involved:  1. Nerves  2. Joints  3. Muscles Body Functions Affected:  1. Sensory/Pain  2. Neuromusculoskeletal  3. Movement Related Activities and Participation Affected:  1. General Tasks and Demands  2. Mobility  3. Community, Social and [place]   Number of elements (examined above) that affect the Plan of Care: 3: MODERATE COMPLEXITY   CLINICAL PRESENTATION:   Presentation: Evolving clinical presentation with changing clinical characteristics: MODERATE COMPLEXITY   CLINICAL DECISION MAKING:   Outcome Measure: Tool Used: Modified Oswestry Low Back Pain Questionnaire  Score:  Initial: 29/50  Most Recent: 25/50 (Date: 9-5-17 )                       24/50 (date: 10-17-17)   Interpretation of Score: Each section is scored on a 0-5 scale, 5 representing the greatest disability. The scores of each section are added together for a total score of 50. Medical Necessity:   · Patient is expected to demonstrate progress in strength, range of motion, balance and coordination to increase independence with ADLs and ambulation without discomfort. .  Reason for Services/Other Comments:  · Patient continues to require skilled intervention due to increased pain with activity, challenged with daily tasks secondary to discomfort and limited mobility in lumbar spine and pelvis. .   Use of outcome tool(s) and clinical judgement create a POC that gives a: Questionable prediction of patient's progress: MODERATE COMPLEXITY            TREATMENT:   (In addition to Assessment/Re-Assessment sessions the following treatments were rendered)  Pre-treatment Symptoms/Complaints:  Patient notes had a few bad weeks and has noted increased tension in her right anterior hip and groin area. She has been challenged with sitting at work and traveling in a car.    Pain: Initial: Pain Intensity 1: 6  Pain Location 1: Pelvis, Spine, lumbar  Pain Orientation 1: Anterior, Posterior, Right  Post Session:  4/10     Therapeutic Exercise: (15 Minutes): Exercises per grid below to improve mobility, strength, balance and coordination. Required minimal verbal and manual cues to promote proper body alignment, promote proper body posture and promote proper body mechanics. Progressed resistance, range, repetitions and complexity of movement as indicated. Date:  11/14/2017   Activity/Exercise Parameters   Child pose X 5 reps, 10 sec holds   Abdominal bracing X 10, 5 sec holds  X 10 reps UE/LE, 5 sec holds with distraction   Postural education    Quadriped arch/sag X 5 reps, 10 sec holds   Lower trunk rotation multifidi activation X 5 reps, slow and controlled bilaterally. 1/2 prone rectus femoris stretch/hip flexors    Standing innominate swings    Lateral side steps    Supine hip ER stretch with block    Supine hip release    Supine hamstrings stretch X 5 reps, 10 sec holds   Supine innominate depression mobilization X 10 reps, 10 sec holds     Manual Therapy (    Soft Tissue Mobilization Duration  Duration: 45 Minutes): Manual techniques to facilitate improved motion and decreased pain. · Supine anterior right iliopsoas release with FMP of lower trunk rotation  · Supine right cecum soft tissue mobilization   · Supine right inferior glide of pubic ramus, followed with NMR   · Supine right inferior glide of innominate depression followed with NMR  · Side lying left for right QL soft tissue mobilization with pelvic PNF patterns of anterior elevation and posterior depression. · Side lying soft tissue mobilization along groove of spine bilaterally,  (Used abbreviations: MET - muscle energy technique; PNF - proprioceptive neuromuscular facilitation; NMR - neuromuscular re-education; a/p - anterior to posterior; p/a - posterior to anterior, FMP - functional movement patterns)      Treatment/Session Assessment:    · Response to Treatment: decreased soft tissue restrictions noted in right anterior pelvis and hip region.    · Compliance with Program/Exercises: compliant all of the time. · Recommendations/Intent for next treatment session: \"Next visit will focus on advancements to more challenging activities\".   Total Treatment Duration:  PT Patient Time In/Time Out  Time In: 1340  Time Out: 235 [facility] Melody He, PT

## 2017-11-28 ENCOUNTER — HOSPITAL ENCOUNTER (OUTPATIENT)
Dept: PHYSICAL THERAPY | Age: 52
Discharge: HOME | End: 2017-11-28
Payer: COMMERCIAL

## 2017-11-28 PROCEDURE — 97110 THERAPEUTIC EXERCISES: CPT

## 2017-11-28 PROCEDURE — 97140 MANUAL THERAPY 1/> REGIONS: CPT

## 2017-11-29 NOTE — THERAPY RECERTIFICATION
Aleshia Saida  : [facility] [address]  [phone]   [phone]        OUTPATIENT PHYSICAL THERAPY:Daily Note and Recertification     ICD-10: Treatment Diagnosis: low back pain M54.5  ICD-10: Treatment Diagnosis: muscle spasm of back M62.830    Precautions/Allergies:   Penicillin; Aciphex [rabeprazole]; and Augmentin [amoxicillin-pot clavulanate]   Fall Risk Score: 1 (? 5 = High Risk)  MD Orders: self referred MEDICAL/REFERRING DIAGNOSIS:  Low back pain [M54.5]   DATE OF ONSET: 2017 flare up  REFERRING PHYSICIAN: Steffi Hernandez 89: as needed     INITIAL ASSESSMENT:  Ms. Jatinder Branch is a 47 y.o. female who presents to physical therapy with chronic low back pain with a flare up in 2017. She demonstrates increased soft tissue restrictions along her right side of thoracic and lumbar spine and demonstrates limited ROM through thoracic and lumbar spine flexion, extension and rotation. She demonstrates arthrokinematic dysfunctions throughout her lumbar spine and pelvis, soft tissue restrictions through anterior pelvis/hip right greater than left, decreased activation of core stability and weakness through hips and pelvis. She would benefit from skilled physical therapy to improve overall mobility and function. 17: Patient has demonstrated improved mobility through pelvis and lumbar spine, however, continues to demonstrate joint and soft tissue restrictions, strength deficits and postural deficits. She would benefit from continued physical therapy to improve overall mobility and function with daily activities. 10/17/17: patient has demonstrated improved mobility through right hip and pelvis, decreased restrictions through lumbar spine.  Continues to have good and bad days, however challenged with prolonged weight bearing activities and ambulation secondary to continued low back pain and right anterior hip. She would benefit from continued therapy services to improve overall mobility and function. 11/28/17: Patient continues to have increased discomfort in right anterior hip/pelvis. Continues to have low back pain depending on amount of activity. Notes increased stress levels with work activities. She continues to work on weight loss to help with her her current symptoms. She would benefit from continued therapy services to improve overall mobility and function with daily activities. PROBLEM LIST (Impacting functional limitations):  1. Decreased Strength  2. Decreased ADL/Functional Activities  3. Decreased Ambulation Ability/Technique  4. Increased Pain  5. Decreased Activity Tolerance  6. Decreased Flexibility/Joint Mobility INTERVENTIONS PLANNED:  1. Home Exercise Program (HEP)  2. Manual Therapy  3. Neuromuscular Re-education/Strengthening  4. Range of Motion (ROM)  5. Therapeutic Exercise/Strengthening   TREATMENT PLAN:  Effective Dates:  11/28/17 TO 2/20/18. Frequency/Duration: Every other week for 12 weeks. GOALS: (Goals have been discussed and agreed upon with patient.)    Discharge Goals: Time Frame: 12 weeks  1. Patient demonstrates independence with her HEP without verbal cueing from therapist. - GOAL MET  2. Patient able to ambulate for 30 minutes without onset of low back pain. - ONGOING  3. Patient able to perform sit to stand transfers from various surfaces without onset of low back pain. - GOAL MET  4. Improve Oswestry outcome measure score from 29/50 to 10/50 to perform ADLs - ONGOING  Rehabilitation Potential For Stated Goals: Excellent              The information in this section was collected on 8/8/17 (except where otherwise noted). HISTORY:   History of Present Injury/Illness (Reason for Referral):  Chronic low back pain for years and she has been doing really well up until June 2017.  Prior to the flare up she was exercising regularly, cycling and having little to no discomfort. Patient notes increased pain and stiffness with sit to stand transfers. She is challenged with bending and lifting, pain with walking and sitting. Past Medical History/Comorbidities: . Ms. Alf Winter  has a past medical history of Allergic rhinitis, cause unspecified; Esophageal reflux; Essential hypertension, benign; Extrinsic asthma, unspecified; Generalized anxiety disorder; Hypothyroidism; and Unspecified sleep apnea. Ms. Alf Winter  has a past surgical history that includes cholecystectomy (1/07); back surgery; and gyn (12/10). Social History/Living Environment:     Lives in a private home with her , no physical barriers present in home setting  Prior Level of Function/Work/Activity:   Independent, currently challenged with sitting prolonged at work and unable to perform normal workout routine secondary to pain. Challenged with sitting at work with increased pain noted in right anterior hip  Dominant Side:         RIGHT    Current Medications:       Current Outpatient Prescriptions:     meloxicam (MOBIC) 15 mg tablet, Take 1 Tab by mouth daily. , Disp: 30 Tab, Rfl: 1    lisinopril (PRINIVIL, ZESTRIL) 10 mg tablet, TAKE 1 TABLET BY MOUTH DAILY, Disp: 90 Tab, Rfl: 1    fluticasone-vilanterol (BREO ELLIPTA) 200-25 mcg/dose inhaler, Take 1 Puff by inhalation daily. , Disp: 1 Inhaler, Rfl: 5    montelukast (SINGULAIR) 10 mg tablet, TAKE ONE TABLET BY MOUTH EVERY DAY, Disp: 90 Tab, Rfl: 1    olopatadine (PATANOL) 0.1 % ophthalmic solution, Administer 2 Drops to both eyes two (2) times a day., Disp: 5 mL, Rfl: 5    B.infantis-B.ani-B.long-B.bifi (PROBIOTIC 4X) 10-15 mg TbEC, Take  by mouth., Disp: , Rfl:     omeprazole (PRILOSEC) 40 mg capsule, TAKE ONE CAPSULE BY MOUTH EVERY DAY, Disp: 30 Cap, Rfl: 5    PROAIR RESPICLICK 90 mcg/actuation aepb, INHALE ONE PUFF BY MOUTH EVERY 4 HOURS AS NEEDED, Disp: , Rfl: 5    fexofenadine (ALLEGRA) 180 mg tablet, Take  by mouth., Disp: , Rfl:    fluticasone (FLONASE) 50 mcg/actuation nasal spray, take 1 spray(s) intranasally once a day for 30 day(s), Disp: 1 Bottle, Rfl: 11    magnesium carbonate 54 mg/5 mL liqd, Take  by mouth., Disp: , Rfl:     albuterol (PROVENTIL HFA, VENTOLIN HFA, PROAIR HFA) 90 mcg/actuation inhaler, Take 2 Puffs by inhalation every four (4) hours as needed for Wheezing., Disp: 1 Inhaler, Rfl: 11   Date Last Reviewed:  11/28/2017   Number of Personal Factors/Comorbidities that affect the Plan of Care: 1-2: MODERATE COMPLEXITY   EXAMINATION:   Observation/Orthostatic Postural Assessment:          Patient denies any LE pain, paresthesia or symptoms. Patient denies any increase of symptoms with cough, sneeze or valsalva. Patient denies any saddle paresthesia or bowel/bladder deficits. Patient exhibits a decreased lumbar lordosis and slight increased thoracic kyphosis with convex right type I curve thoracic/lumbar spine. Lower extremity weight bearing is symmetrical. Shoulder symmetry exhibits bilateral protraction. Observation of gait indicates decreased pelvic mobility (patient is a hip walker versus an efficient trunk walker). Lower quadrant bony landmarks are right iliac crest elevated compared to left, level greater trochanter, left greater trochanter anterior compared to right. Leg swing for innominate mobility indicates decreased bilaterally innominate extension. Vertical compression test (VCT) for alignment is 3+. Elbow flexion test (EFT) for motor activation is 3+. Soft tissue observation indicates restrictions in right iliopsoas, QL, thoracic and lumbar spine paraspinals, and piriformis bilaterally. Palpation: Myofascial assessment indicates restrictions through thoracolumbar fascia and around sacrococcygeal junction. improving Muscle length testing in modified Victor Manuel position exhibits restricted right psoas and rectus femoris  improving. Hamstring flexibility tested supine with straight leg raise (SLR) is WFL.  Piriformis length is restricted right greater than left. improving Quadriceps flexibility tested prone with heel to buttock is restricted right. Anahi Harder test is positive right for rectus femoris tightness. Gastrocnemius and soleus flexibility are WFL. Sacrotuberous ligament is restricted right. Sacrococcygeal junction exhibits right rotated. Body of coccyx is flexed. improved  ROM: Passive trunk rotation is 85% available to the R and 85% available to the L. Kinetic testing in standing including forward bend test is positive left. Marcher's test is positive right. Pelvic shear test is standing exhibits decreased excursion of bilateral shear with a hard end feel improving. Single plane active movements through lumbar spine in standing exhibit decreased lumbar flexion, extension hinge at L3 and restrictions in right side bending. Lumbar positional testing at transverse processes indicates FRS left L4-5 with limitations in extension, rotation and side bending right improving. Prone knee active flexion test is positive right. Spring testing of lumbar spine exhibits decreased a/p mobilization at L4-5 and sacral base. improving  AROM (PROM) Right Left   Hip flexion/Innominate flexion 100 100   Hip extension/Innominate extension 5 5   Hip external rotation (ER) 20 20   Hip internal rotation (IR) 15 20   Strength: Lumbar protective mechanism (LPM) are not tested today for initiation. LPM Strength */5   R anterior to posterior diagonal 3+   L anterior to posterior diagonal 3+   R posterior to anterior diagonal 3+   L posterior to anterior diagonal 3+     Manual Muscle Test (*/5) Right Left   Knee extension 5 5   Knee flexion 5 5   Hip flexion 4 4   Hip ER 4 4   Hip IR 4 4   Hip extension 4 4   Hip abduction 3+ 3+   Hip adduction 5 5   Ankle DF 5 5   Ankle PF 5 5   Core stability 4-/5     Special Tests:  Leo Adrienne test is negative.   Scours test is negative  Neurological Screen:  Myotomes: Key muscle strength testing through bilateral LE is intact. Dermatomes: Sensation testing through bilateral lower quadrants for light touch is intact. Reflexes: Patellar (L4) and Achilles (S1) are not tested today. Neural tension tests: Passive straight leg raise (SLR) test is negative. Slump test is negative. .  Functional Mobility:  Challenged with sit to stand transfers, ambulation and rotation movements through thoracic and lumbar spine. Body Structures Involved:  1. Nerves  2. Joints  3. Muscles Body Functions Affected:  1. Sensory/Pain  2. Neuromusculoskeletal  3. Movement Related Activities and Participation Affected:  1. General Tasks and Demands  2. Mobility  3. Community, Social and [place]   Number of elements (examined above) that affect the Plan of Care: 3: MODERATE COMPLEXITY   CLINICAL PRESENTATION:   Presentation: Evolving clinical presentation with changing clinical characteristics: MODERATE COMPLEXITY   CLINICAL DECISION MAKING:   Outcome Measure: Tool Used: Modified Oswestry Low Back Pain Questionnaire  Score:  Initial: 29/50  Most Recent: 25/50 (Date: 9-5-17 )                       24/50 (date: 10-17-17)                       22/50 (Date: 11-28-17)   Interpretation of Score: Each section is scored on a 0-5 scale, 5 representing the greatest disability. The scores of each section are added together for a total score of 50. Medical Necessity:   · Patient is expected to demonstrate progress in strength, range of motion, balance and coordination to increase independence with ADLs and ambulation without discomfort. .  Reason for Services/Other Comments:  · Patient continues to require skilled intervention due to increased pain with activity, challenged with daily tasks secondary to discomfort and limited mobility in lumbar spine and pelvis. .   Use of outcome tool(s) and clinical judgement create a POC that gives a: Questionable prediction of patient's progress: MODERATE COMPLEXITY            TREATMENT:   (In addition to Assessment/Re-Assessment sessions the following treatments were rendered)  Pre-treatment Symptoms/Complaints:  Patient continues to note increased discomfort in her right anterior pelvis. She continues to be seen by her chiropractor and myofascial release therapist.   Pain: Initial: Pain Intensity 1: 5  Pain Location 1: Spine, lumbar  Pain Orientation 1: Right  Post Session:  4/10     Therapeutic Exercise: (10 Minutes):  Exercises per grid below to improve mobility, strength, balance and coordination. Required minimal verbal and manual cues to promote proper body alignment, promote proper body posture and promote proper body mechanics. Progressed resistance, range, repetitions and complexity of movement as indicated. Date:  11/28/2017   Activity/Exercise Parameters   Child pose X 5 reps, 10 sec holds   Abdominal bracing X 10, 5 sec holds  X 10 reps UE/LE, 5 sec holds with distraction   Postural education    Quadriped arch/sag X 5 reps, 10 sec holds   Lower trunk rotation multifidi activation X 5 reps, slow and controlled bilaterally. 1/2 prone rectus femoris stretch/hip flexors    Standing innominate swings    Lateral side steps    Supine hip ER stretch with block    Supine hip release    Supine hamstrings stretch X 5 reps, 10 sec holds   Supine innominate depression mobilization      Manual Therapy (    Soft Tissue Mobilization Duration  Duration: 45 Minutes): Manual techniques to facilitate improved motion and decreased pain. · Supine anterior right iliopsoas release with FMP of lower trunk rotation  · Supine right inferior glide of pubic ramus, followed with NMR   · Supine right inferior glide of innominate depression followed with NMR  · Supine bilateral hamstrings and hip adductors soft tissue mobilization with FMP of knee extension  · Side lying left for right QL soft tissue mobilization with pelvic PNF patterns of anterior elevation and posterior depression.     (Used abbreviations: MET - muscle energy technique; PNF - proprioceptive neuromuscular facilitation; NMR - neuromuscular re-education; a/p - anterior to posterior; p/a - posterior to anterior, FMP - functional movement patterns)      Treatment/Session Assessment:    · Response to Treatment: improved hip and innominate flexion at end of session, improved pelvic mobility with gait. · Compliance with Program/Exercises: compliant all of the time. · Recommendations/Intent for next treatment session: \"Next visit will focus on advancements to more challenging activities\".   Total Treatment Duration:  PT Patient Time In/Time Out  Time In: 5135  Time Out: [place], MARYAM

## 2017-12-04 ENCOUNTER — APPOINTMENT (OUTPATIENT)
Dept: PHYSICAL THERAPY | Age: 52
End: 2017-12-04
Payer: COMMERCIAL

## 2017-12-05 ENCOUNTER — APPOINTMENT (OUTPATIENT)
Dept: PHYSICAL THERAPY | Age: 52
End: 2017-12-05
Payer: COMMERCIAL

## 2017-12-13 ENCOUNTER — APPOINTMENT (OUTPATIENT)
Dept: PHYSICAL THERAPY | Age: 52
End: 2017-12-13
Payer: COMMERCIAL

## 2017-12-20 ENCOUNTER — HOSPITAL ENCOUNTER (OUTPATIENT)
Dept: PHYSICAL THERAPY | Age: 52
Discharge: HOME | End: 2017-12-20
Payer: COMMERCIAL

## 2017-12-20 PROCEDURE — 97140 MANUAL THERAPY 1/> REGIONS: CPT

## 2017-12-20 PROCEDURE — 97110 THERAPEUTIC EXERCISES: CPT

## 2017-12-21 NOTE — PROGRESS NOTES
Mackenzie Westbrook  : 1965 [facility] [address]  [phone]   [phone]        OUTPATIENT PHYSICAL THERAPY:Daily Note 2017    ICD-10: Treatment Diagnosis: low back pain M54.5  ICD-10: Treatment Diagnosis: muscle spasm of back M62.830    Precautions/Allergies:   Penicillin; Aciphex [rabeprazole]; and Augmentin [amoxicillin-pot clavulanate]   Fall Risk Score: 1 (? 5 = High Risk)  MD Orders: self referred MEDICAL/REFERRING DIAGNOSIS:  Low back pain [M54.5]   DATE OF ONSET: 2017 flare up  REFERRING PHYSICIAN: Steffi Hernandez 89: as needed     INITIAL ASSESSMENT:  Ms. Mariia Johnson is a 47 y.o. female who presents to physical therapy with chronic low back pain with a flare up in 2017. She demonstrates increased soft tissue restrictions along her right side of thoracic and lumbar spine and demonstrates limited ROM through thoracic and lumbar spine flexion, extension and rotation. She demonstrates arthrokinematic dysfunctions throughout her lumbar spine and pelvis, soft tissue restrictions through anterior pelvis/hip right greater than left, decreased activation of core stability and weakness through hips and pelvis. She would benefit from skilled physical therapy to improve overall mobility and function. 17: Patient has demonstrated improved mobility through pelvis and lumbar spine, however, continues to demonstrate joint and soft tissue restrictions, strength deficits and postural deficits. She would benefit from continued physical therapy to improve overall mobility and function with daily activities. 10/17/17: patient has demonstrated improved mobility through right hip and pelvis, decreased restrictions through lumbar spine.  Continues to have good and bad days, however challenged with prolonged weight bearing activities and ambulation secondary to continued low back pain and right anterior hip. She would benefit from continued therapy services to improve overall mobility and function. 11/28/17: Patient continues to have increased discomfort in right anterior hip/pelvis. Continues to have low back pain depending on amount of activity. Notes increased stress levels with work activities. She continues to work on weight loss to help with her her current symptoms. She would benefit from continued therapy services to improve overall mobility and function with daily activities. PROBLEM LIST (Impacting functional limitations):  1. Decreased Strength  2. Decreased ADL/Functional Activities  3. Decreased Ambulation Ability/Technique  4. Increased Pain  5. Decreased Activity Tolerance  6. Decreased Flexibility/Joint Mobility INTERVENTIONS PLANNED:  1. Home Exercise Program (HEP)  2. Manual Therapy  3. Neuromuscular Re-education/Strengthening  4. Range of Motion (ROM)  5. Therapeutic Exercise/Strengthening   TREATMENT PLAN:  Effective Dates:  11/28/17 TO 2/20/18. Frequency/Duration: Every other week for 12 weeks. GOALS: (Goals have been discussed and agreed upon with patient.)    Discharge Goals: Time Frame: 12 weeks  1. Patient demonstrates independence with her HEP without verbal cueing from therapist. - GOAL MET  2. Patient able to ambulate for 30 minutes without onset of low back pain. - ONGOING  3. Patient able to perform sit to stand transfers from various surfaces without onset of low back pain. - GOAL MET  4. Improve Oswestry outcome measure score from 29/50 to 10/50 to perform ADLs - ONGOING  Rehabilitation Potential For Stated Goals: Excellent              The information in this section was collected on 8/8/17 (except where otherwise noted). HISTORY:   History of Present Injury/Illness (Reason for Referral):  Chronic low back pain for years and she has been doing really well up until June 2017.  Prior to the flare up she was exercising regularly, cycling and having little to no discomfort. Patient notes increased pain and stiffness with sit to stand transfers. She is challenged with bending and lifting, pain with walking and sitting. Past Medical History/Comorbidities: . Ms. Candace Alexandra  has a past medical history of Allergic rhinitis, cause unspecified; Esophageal reflux; Essential hypertension, benign; Extrinsic asthma, unspecified; Generalized anxiety disorder; Hypothyroidism; and Unspecified sleep apnea. Ms. Candace Alexandra  has a past surgical history that includes hx cholecystectomy (1/07); hx back surgery; and hx gyn (12/10). Social History/Living Environment:     Lives in a private home with her , no physical barriers present in home setting  Prior Level of Function/Work/Activity:   Independent, currently challenged with sitting prolonged at work and unable to perform normal workout routine secondary to pain. Challenged with sitting at work with increased pain noted in right anterior hip  Dominant Side:         RIGHT    Current Medications:       Current Outpatient Prescriptions:     meloxicam (MOBIC) 15 mg tablet, Take 1 Tab by mouth daily. , Disp: 30 Tab, Rfl: 1    lisinopril (PRINIVIL, ZESTRIL) 10 mg tablet, TAKE 1 TABLET BY MOUTH DAILY, Disp: 90 Tab, Rfl: 1    fluticasone-vilanterol (BREO ELLIPTA) 200-25 mcg/dose inhaler, Take 1 Puff by inhalation daily. , Disp: 1 Inhaler, Rfl: 5    montelukast (SINGULAIR) 10 mg tablet, TAKE ONE TABLET BY MOUTH EVERY DAY, Disp: 90 Tab, Rfl: 1    olopatadine (PATANOL) 0.1 % ophthalmic solution, Administer 2 Drops to both eyes two (2) times a day., Disp: 5 mL, Rfl: 5    B.infantis-B.ani-B.long-B.bifi (PROBIOTIC 4X) 10-15 mg TbEC, Take  by mouth., Disp: , Rfl:     omeprazole (PRILOSEC) 40 mg capsule, TAKE ONE CAPSULE BY MOUTH EVERY DAY, Disp: 30 Cap, Rfl: 5    PROAIR RESPICLICK 90 mcg/actuation aepb, INHALE ONE PUFF BY MOUTH EVERY 4 HOURS AS NEEDED, Disp: , Rfl: 5    fexofenadine (ALLEGRA) 180 mg tablet, Take  by mouth., Disp: , Rfl:    fluticasone (FLONASE) 50 mcg/actuation nasal spray, take 1 spray(s) intranasally once a day for 30 day(s), Disp: 1 Bottle, Rfl: 11    magnesium carbonate 54 mg/5 mL liqd, Take  by mouth., Disp: , Rfl:     albuterol (PROVENTIL HFA, VENTOLIN HFA, PROAIR HFA) 90 mcg/actuation inhaler, Take 2 Puffs by inhalation every four (4) hours as needed for Wheezing., Disp: 1 Inhaler, Rfl: 11   Date Last Reviewed:  12/20/2017   Number of Personal Factors/Comorbidities that affect the Plan of Care: 1-2: MODERATE COMPLEXITY   EXAMINATION:   Observation/Orthostatic Postural Assessment:          Patient denies any LE pain, paresthesia or symptoms. Patient denies any increase of symptoms with cough, sneeze or valsalva. Patient denies any saddle paresthesia or bowel/bladder deficits. Patient exhibits a decreased lumbar lordosis and slight increased thoracic kyphosis with convex right type I curve thoracic/lumbar spine. Lower extremity weight bearing is symmetrical. Shoulder symmetry exhibits bilateral protraction. Observation of gait indicates decreased pelvic mobility (patient is a hip walker versus an efficient trunk walker). Lower quadrant bony landmarks are right iliac crest elevated compared to left, level greater trochanter, left greater trochanter anterior compared to right. Leg swing for innominate mobility indicates decreased bilaterally innominate extension. Vertical compression test (VCT) for alignment is 3+. Elbow flexion test (EFT) for motor activation is 3+. Soft tissue observation indicates restrictions in right iliopsoas, QL, thoracic and lumbar spine paraspinals, and piriformis bilaterally. Palpation: Myofascial assessment indicates restrictions through thoracolumbar fascia and around sacrococcygeal junction. improving Muscle length testing in modified Victor Manuel position exhibits restricted right psoas and rectus femoris  improving. Hamstring flexibility tested supine with straight leg raise (SLR) is WFL.  Piriformis length is restricted right greater than left. improving Quadriceps flexibility tested prone with heel to buttock is restricted right. Shirley Locus test is positive right for rectus femoris tightness. Gastrocnemius and soleus flexibility are WFL. Sacrotuberous ligament is restricted right. Sacrococcygeal junction exhibits right rotated. Body of coccyx is flexed. improved  ROM: Passive trunk rotation is 85% available to the R and 85% available to the L. Kinetic testing in standing including forward bend test is positive left. Marcher's test is positive right. Pelvic shear test is standing exhibits decreased excursion of bilateral shear with a hard end feel improving. Single plane active movements through lumbar spine in standing exhibit decreased lumbar flexion, extension hinge at L3 and restrictions in right side bending. Lumbar positional testing at transverse processes indicates FRS left L4-5 with limitations in extension, rotation and side bending right improving. Prone knee active flexion test is positive right. Spring testing of lumbar spine exhibits decreased a/p mobilization at L4-5 and sacral base. improving  AROM (PROM) Right Left   Hip flexion/Innominate flexion 100 100   Hip extension/Innominate extension 5 5   Hip external rotation (ER) 20 20   Hip internal rotation (IR) 15 20   Strength: Lumbar protective mechanism (LPM) are not tested today for initiation. LPM Strength */5   R anterior to posterior diagonal 3+   L anterior to posterior diagonal 3+   R posterior to anterior diagonal 3+   L posterior to anterior diagonal 3+     Manual Muscle Test (*/5) Right Left   Knee extension 5 5   Knee flexion 5 5   Hip flexion 4 4   Hip ER 4 4   Hip IR 4 4   Hip extension 4 4   Hip abduction 3+ 3+   Hip adduction 5 5   Ankle DF 5 5   Ankle PF 5 5   Core stability 4-/5     Special Tests:  Sharlette Hayder test is negative.   Scours test is negative  Neurological Screen:  Myotomes: Key muscle strength testing through bilateral LE is intact. Dermatomes: Sensation testing through bilateral lower quadrants for light touch is intact. Reflexes: Patellar (L4) and Achilles (S1) are not tested today. Neural tension tests: Passive straight leg raise (SLR) test is negative. Slump test is negative. .  Functional Mobility:  Challenged with sit to stand transfers, ambulation and rotation movements through thoracic and lumbar spine. Body Structures Involved:  1. Nerves  2. Joints  3. Muscles Body Functions Affected:  1. Sensory/Pain  2. Neuromusculoskeletal  3. Movement Related Activities and Participation Affected:  1. General Tasks and Demands  2. Mobility  3. Community, Social and [place]   Number of elements (examined above) that affect the Plan of Care: 3: MODERATE COMPLEXITY   CLINICAL PRESENTATION:   Presentation: Evolving clinical presentation with changing clinical characteristics: MODERATE COMPLEXITY   CLINICAL DECISION MAKING:   Outcome Measure: Tool Used: Modified Oswestry Low Back Pain Questionnaire  Score:  Initial: 29/50  Most Recent: 25/50 (Date: 9-5-17 )                       24/50 (date: 10-17-17)                       22/50 (Date: 11-28-17)   Interpretation of Score: Each section is scored on a 0-5 scale, 5 representing the greatest disability. The scores of each section are added together for a total score of 50. Medical Necessity:   · Patient is expected to demonstrate progress in strength, range of motion, balance and coordination to increase independence with ADLs and ambulation without discomfort. .  Reason for Services/Other Comments:  · Patient continues to require skilled intervention due to increased pain with activity, challenged with daily tasks secondary to discomfort and limited mobility in lumbar spine and pelvis. .   Use of outcome tool(s) and clinical judgement create a POC that gives a: Questionable prediction of patient's progress: MODERATE COMPLEXITY            TREATMENT:   (In addition to Assessment/Re-Assessment sessions the following treatments were rendered)  Pre-treatment Symptoms/Complaints:  Patient continues to have good and bad days, however challenged with prolonged sitting and increased discomfort noted in right pelvis and posterior hip and pelvis. Pain: Initial: Pain Intensity 1: 6  Pain Location 1: Spine, lumbar  Pain Orientation 1: Right  Post Session:  4/10     Therapeutic Exercise: (10 Minutes):  Exercises per grid below to improve mobility, strength, balance and coordination. Required minimal verbal and manual cues to promote proper body alignment, promote proper body posture and promote proper body mechanics. Progressed resistance, range, repetitions and complexity of movement as indicated. Date:  12/20/2017   Activity/Exercise Parameters   Child pose X 5 reps, 10 sec holds   Abdominal bracing X 10, 5 sec holds  X 10 reps UE/LE, 5 sec holds with distraction  X 3 reps 90/90 abdominal series push, to fatigue. Postural education    Quadriped arch/sag X 5 reps, 10 sec holds   Lower trunk rotation multifidi activation X 5 reps, slow and controlled bilaterally. 1/2 prone rectus femoris stretch/hip flexors    Standing innominate swings    Lateral side steps    Supine hip ER stretch with block    Supine hip release    Supine hamstrings stretch X 5 reps, 10 sec holds   Supine innominate depression mobilization      Manual Therapy (    Soft Tissue Mobilization Duration  Duration: 50 Minutes): Manual techniques to facilitate improved motion and decreased pain.   · Supine anterior right iliopsoas release with FMP of lower trunk rotation  · Supine right inferior glide of pubic ramus, followed with NMR   · Supine right inferior glide of innominate depression followed with NMR  · Supine bilateral hamstrings and hip adductors soft tissue mobilization with FMP of knee extension  · Side lying left for right QL soft tissue mobilization with pelvic PNF patterns of anterior elevation and posterior depression. · Supine right innominate abduction mobilization with NMR into hip adduction and IR  · Prone soft tissue mobilization to lumbar spine and posterior pelvis and sacrum with FMP of lower trunk rotation. (Used abbreviations: MET - muscle energy technique; PNF - proprioceptive neuromuscular facilitation; NMR - neuromuscular re-education; a/p - anterior to posterior; p/a - posterior to anterior, FMP - functional movement patterns)      Treatment/Session Assessment:    · Response to Treatment: decreased soft tissue restrictions noted in pelvis and improved innominate abduction at end of session. · Compliance with Program/Exercises: compliant all of the time. · Recommendations/Intent for next treatment session: \"Next visit will focus on advancements to more challenging activities\".   Total Treatment Duration:  PT Patient Time In/Time Out  Time In: 0930  Time Out: [address] Cathie Bryant PT

## 2018-01-24 ENCOUNTER — HOSPITAL ENCOUNTER (OUTPATIENT)
Dept: PHYSICAL THERAPY | Age: 53
Discharge: HOME | End: 2018-01-24

## 2018-01-31 NOTE — THERAPY DISCHARGE
Mike Ureña  : 1965 [facility] [address]  [phone]   [phone]        OUTPATIENT PHYSICAL THERAPY:Discontinuation Summary 2018    ICD-10: Treatment Diagnosis: low back pain M54.5  ICD-10: Treatment Diagnosis: muscle spasm of back M62.830    Precautions/Allergies:   Penicillin; Aciphex [rabeprazole]; and Augmentin [amoxicillin-pot clavulanate]   Fall Risk Score: 1 (? 5 = High Risk)  MD Orders: self referred MEDICAL/REFERRING DIAGNOSIS:  Low back pain [M54.5]   DATE OF ONSET: 2017 flare up  REFERRING PHYSICIAN: Steffi Hernandez 89: as needed     INITIAL ASSESSMENT:  Ms. Pippa Anthony is a 47 y.o. female who presents to physical therapy with chronic low back pain with a flare up in 2017. She demonstrates increased soft tissue restrictions along her right side of thoracic and lumbar spine and demonstrates limited ROM through thoracic and lumbar spine flexion, extension and rotation. She demonstrates arthrokinematic dysfunctions throughout her lumbar spine and pelvis, soft tissue restrictions through anterior pelvis/hip right greater than left, decreased activation of core stability and weakness through hips and pelvis. She would benefit from skilled physical therapy to improve overall mobility and function. 17: Patient continues to have increased discomfort in right anterior hip/pelvis. Continues to have low back pain depending on amount of activity. Notes increased stress levels with work activities. She continues to work on weight loss to help with her her current symptoms. She would benefit from continued therapy services to improve overall mobility and function with daily activities. 18: Mike Ureña has been seen in physical therapy from 17 to 17 for 13 of 15 scheduled visits.    Treatment has been discontinued at this time due to patient is working independently on her HEP, she plans to follow up 1x a month under our screen program as needed to continue to progress her mobility and function with daily tasks. .  The below goals were met prior to discontinuation. Some goals were not met due to patient is working independently to achieve remaining goals. Thank you for this referral.             TREATMENT PLAN:   GOALS: (Goals have been discussed and agreed upon with patient.)    Discharge Goals: Time Frame: 12 weeks   1. Patient demonstrates independence with her HEP without verbal cueing from therapist - GOAL MET  2. Patient able to ambulate 30 minutes without onset of low back pain - NOT MET  3. Patient able to perform sit to stand transfers from various surfaces without onset of low back pain. - GOAL MET  4.  Improve Oswestry outcome measure score from 29/50 to 10/50 to perform ADLs - GOAL MET  Rehabilitation Potential For Stated Goals: Excellent

## 2018-02-28 ENCOUNTER — APPOINTMENT (OUTPATIENT)
Dept: PHYSICAL THERAPY | Age: 53
End: 2018-02-28

## 2018-04-28 ENCOUNTER — HOSPITAL ENCOUNTER (OUTPATIENT)
Dept: MAMMOGRAPHY | Age: 53
Discharge: HOME | End: 2018-04-28
Attending: FAMILY MEDICINE
Payer: COMMERCIAL

## 2018-04-28 DIAGNOSIS — Z12.31 VISIT FOR SCREENING MAMMOGRAM: ICD-10-CM

## 2018-04-28 PROCEDURE — 77067 SCR MAMMO BI INCL CAD: CPT

## 2018-08-23 ENCOUNTER — HOSPITAL ENCOUNTER (OUTPATIENT)
Dept: PHYSICAL THERAPY | Age: 53
Discharge: HOME | End: 2018-08-23
Payer: COMMERCIAL

## 2018-08-23 PROCEDURE — 97162 PT EVAL MOD COMPLEX 30 MIN: CPT

## 2018-08-24 NOTE — PROGRESS NOTES
Ambulatory/Rehab Services H2 Model Falls Risk Assessment    Risk Factor Pts. ·   Confusion/Disorientation/Impulsivity  []    4 ·   Symptomatic Depression  []   2 ·   Altered Elimination  []   1 ·   Dizziness/Vertigo  []   1 ·   Gender (Male)  []   1 ·   Any administered antiepileptics (anticonvulsants):  []   2 ·   Any administered benzodiazepines:  []   1 ·   Visual Impairment (specify):  []   1 ·   Portable Oxygen Use  []   1 ·   Orthostatic ? BP  []   1 ·   History of Recent Falls (within 3 mos.)  []   5     Ability to Rise from Chair (choose one) Pts. ·   Ability to rise in a single movement  []   0 ·   Pushes up, successful in one attempt  [x]   1 ·   Multiple attempts, but successful  []   3 ·   Unable to rise without assistance  []   4   Total: (5 or greater = High Risk) 1     Falls Prevention Plan:   []                Physical Limitations to Exercise (specify):   []                Mobility Assistance Device (type):   []                Exercise/Equipment Adaptation (specify):    ©2010 [facility] [address] Patent #[id number].  Federal Law prohibits the replication, distribution or use without written permission from [facility]

## 2018-08-24 NOTE — THERAPY EVALUATION
Kimi Fields  : 1965  Primary: [place] [facility]  Secondary:  [facility] at [facility], [address] Located within [facility]  [phone]   [phone]        OUTPATIENT PHYSICAL THERAPY:Initial Assessment 2018   ICD-10: Treatment Diagnosis: Plantar fasciitis (fibromatosis) M72.2  ICD-10: Treatment Diagnosis: Achilles tendonitis, left M76.62  ICD-10: Treatment Diagnosis: difficulty walking, not elsewhere classified R26.2  Precautions/Allergies:   Penicillin; Aciphex [rabeprazole]; and Augmentin [amoxicillin-pot clavulanate]   Fall Risk Score: 1 (? 5 = High Risk)  MD Orders: self referred MEDICAL/REFERRING DIAGNOSIS:  Foot pain [M79.673]   DATE OF ONSET: 2018  REFERRING PHYSICIAN: Referred, Self, MD  RETURN PHYSICIAN APPOINTMENT: as needed     INITIAL ASSESSMENT:  Ms. Sola Krishnan is a 47 y.o. female who presents to physical therapy with insidious onset of left foot pain and tightness since early August. She is challenged with ambulation, sit to stand transfers and significant discomfort with first steps in the AM. She presents with symptoms of plantar fasciitis and minor achilles tendon tendonitis. She is traveling in the next two weeks which involves a significant amount of walking and would benefit from therapy services to improve overall mobility and function. PROBLEM LIST (Impacting functional limitations):  1. Decreased Strength  2. Decreased ADL/Functional Activities  3. Decreased Transfer Abilities  4. Decreased Ambulation Ability/Technique  5. Decreased Balance  6. Increased Pain  7. Decreased Activity Tolerance  8. Decreased Flexibility/Joint Mobility INTERVENTIONS PLANNED:  1. Balance Exercise  2. Gait Training  3. Home Exercise Program (HEP)  4. Manual Therapy  5. Neuromuscular Re-education/Strengthening  6. Range of Motion (ROM)  7. Therapeutic Activites  8.  Therapeutic Exercise/Strengthening   TREATMENT PLAN:  Effective Dates: 8/23/2018 TO 9/22/2018 (30 days). Frequency/Duration: 2 times a week for 30 Days  GOALS: (Goals have been discussed and agreed upon with patient.)  Discharge Goals: Time Frame: 30 days  1. Patient independent with her HEP without verbal cueing from therapist.  2. Patient able to ambulate for 1 hour with pain no more than 0-2/10  3. Patient able to perform weight bearing for 35 minutes with pain no more than 0-2/10. 4. Demonstrates understanding of correct footwear and self taping techniques for her upcoming travel, without verbal cueing from therapist.  Rehabilitation Potential For Stated Goals: Excellent  Regarding Venkatesh Daly's therapy, I certify that the treatment plan above will be carried out by a therapist or under their direction. Thank you for this referral,  Marcia Douglas PT               The information in this section was collected on 8/23/18 (except where otherwise noted). HISTORY:   History of Present Injury/Illness (Reason for Referral): Insidious onset of left foot pain, most discomfort in AM with first steps, sit to stand transfers after sitting prolonged and challenged with gait. If she wears a supportive shoe, less discomfort noted, not able to wear sandals. Patient is traveling to [place] in September and hoping therapy can help with her symptoms. Patient denies dizziness, drop attacks, numbness/tingling, bowel/bladder dysfunction and/or unexplained weight gain/loss. Past Medical History/Comorbidities:   Ms. Elly Leiva  has a past medical history of Allergic rhinitis, cause unspecified; Esophageal reflux; Essential hypertension, benign; Extrinsic asthma, unspecified; Generalized anxiety disorder; Hypothyroidism; Menopause; and Unspecified sleep apnea. Ms. Elly Leiva  has a past surgical history that includes hx cholecystectomy (1/07); hx back surgery; and hx gyn (12/10).      Social History/Living Environment:     lives in a private home with her , challenged with stairs in home, walking barefoot. Prior Level of Function/Work/Activity:  Works 3-4x a week, stairs and walks 1/2 mile distance to work office, recently challenged with left foot pain  Dominant Side:         RIGHT  Current Medications:       Current Outpatient Prescriptions:     lisinopril (PRINIVIL, ZESTRIL) 10 mg tablet, TAKE 1 TABLET BY MOUTH DAILY, Disp: 90 Tab, Rfl: 1    fluticasone-vilanterol (BREO ELLIPTA) 200-25 mcg/dose inhaler, Take 1 Puff by inhalation daily. , Disp: 1 Inhaler, Rfl: 5    montelukast (SINGULAIR) 10 mg tablet, TAKE ONE TABLET BY MOUTH EVERY DAY, Disp: 90 Tab, Rfl: 1    olopatadine (PATANOL) 0.1 % ophthalmic solution, Administer 2 Drops to both eyes two (2) times a day., Disp: 5 mL, Rfl: 5    B.infantis-B.ani-B.long-B.bifi (PROBIOTIC 4X) 10-15 mg TbEC, Take  by mouth., Disp: , Rfl:     omeprazole (PRILOSEC) 40 mg capsule, TAKE ONE CAPSULE BY MOUTH EVERY DAY, Disp: 30 Cap, Rfl: 5    PROAIR RESPICLICK 90 mcg/actuation aepb, INHALE ONE PUFF BY MOUTH EVERY 4 HOURS AS NEEDED, Disp: , Rfl: 5    fexofenadine (ALLEGRA) 180 mg tablet, Take  by mouth., Disp: , Rfl:     fluticasone (FLONASE) 50 mcg/actuation nasal spray, take 1 spray(s) intranasally once a day for 30 day(s), Disp: 1 Bottle, Rfl: 11    magnesium carbonate 54 mg/5 mL liqd, Take  by mouth., Disp: , Rfl:     albuterol (PROVENTIL HFA, VENTOLIN HFA, PROAIR HFA) 90 mcg/actuation inhaler, Take 2 Puffs by inhalation every four (4) hours as needed for Wheezing., Disp: 1 Inhaler, Rfl: 11   Date Last Reviewed:  8/23/2018     Number of Personal Factors/Comorbidities that affect the Plan of Care: 1-2: MODERATE COMPLEXITY   EXAMINATION:   Observation/Orthostatic Postural Assessment:  In WB, medial longitudinal arch is compensated supinated left. In WB, rearfoot appears left calcaneous valgus. Patient exhibits a increased lumbar lordosis and neutral thoracic kyphosis. Single leg stand exhibits challenged with left foot balance, 5 sec holds.  Palpation of lower quarter bony landmarks are left iliac crest elevated, level greater trochanters. Palpation:  Soft tissue mobility through left foot/ankle is restricted in plantar fascia, along borders of Achilles tendon. Muscle length of left greater than right gastrocnemius is restricted in superficial to deep layers. Muscle length of left soleus is restricted. ROM:   Passive Accessory Mobility Testing: Talocrural mobility is restricted posterior glide. Subtalar mobility is restricted left. Midtarsal mobility restricted left. Strength:   Manual Muscle Testing (*/5) Right  Left   Plantarflexion 0-50 0-45   Dorsiflexion 0-10 0-5   Inversion 0-15 0-10   Eversion 0-15 0-10   Functional strength with standing heel raise is 3 inches on the R and 2 inches on the L. Special Tests:  Ligament stress tests including anterior drawer is negative; anterior talofibular is negative; calcaneofibular is negative; posterior talofibular is negative; deltoid ligament is negative; and tibia/fibular compression test is negative. Neurological Screen:  Myotomes: Key muscle strength testing for bilateral LE is intact. Dermatomes: Sensation testing through bilateral LE for light touch is intact. Reflexes: Patellar (L4) and achilles (S1) are 2+ and WFL. Neural tension tests: Slump test is negative   Functional Mobility:  Challenged with weight bearing and ambulation secondary to pain in left foot/leg       Body Structures Involved:  1. Bones  2. Joints  3. Muscles Body Functions Affected:  1. Sensory/Pain  2. Neuromusculoskeletal  3. Movement Related Activities and Participation Affected:  1. General Tasks and Demands  2. Mobility  3. Domestic Life  4.  Community, Social and [place]   Number of elements (examined above) that affect the Plan of Care: 3: MODERATE COMPLEXITY   CLINICAL PRESENTATION:   Presentation: Evolving clinical presentation with changing clinical characteristics: MODERATE COMPLEXITY   CLINICAL DECISION MAKING:   Outcome Measure: Tool Used: PT/OT FOOT AND ANKLE ABILITY MEASURE  Score:  Initial: 54/84 Most Recent: X (Date: -- )   Interpretation of Score: For the \"Activities of Daily Living\", there are 21 questions each scored on a 5 point scale with 0 representing \"Unable to do\" and 4 representing \"No difficulty\". The lower the score, the greater the functional disability. 84/84 represents no disability. Minimal detectable change is 5.7 points. With the addition of the 8 questions in the \"Sports Subscale,\" there are 29 questions, each scored on a 5 point scale with 0 representing \"Unable to do\" and 4 representing \"No difficulty\". The lower the score, the greater the functional disability. 116/116 represents no disability. Minimal detectable change is 12.3 points. Medical Necessity:   · Patient is expected to demonstrate progress in strength, range of motion, balance and coordination to increase independence with ambulation without pain in left foot and perform weight bearing activities without discomfort. .  Reason for Services/Other Comments:  · Patient continues to require skilled intervention due to challenged with ambulation and weight bearing secondary to pain and  discomfort in her left foot/leg. .   Use of outcome tool(s) and clinical judgement create a POC that gives a: Questionable prediction of patient's progress: MODERATE COMPLEXITY            TREATMENT:   (In addition to Assessment/Re-Assessment sessions the following treatments were rendered)  Pre-treatment Symptoms/Complaints:  Patient notes she is traveling to [place] in 3 weeks and hoping she can manage her pain so she can walk while traveling. Pain: Initial:   Pain Intensity 1: 8  Pain Location 1: Foot  Pain Orientation 1: Left  Post Session:  4/10     Therapeutic Exercise: (5 Minutes):  Exercises per grid below to improve mobility, strength, balance and coordination.   Required minimal verbal and manual cues to promote proper body alignment, promote proper body posture, promote proper body mechanics and promote proper body breathing techniques. Progressed resistance, range, repetitions and complexity of movement as indicated. Date:  8/23/2018   Activity/Exercise Parameters   Review self mobilization, stretching and foot ROM exercises X 5 minutes                             Manual Therapy (    Soft Tissue Mobilization Duration  Duration: 5 Minutes): Manual techniques to facilitate improved motion and decreased pain. (Used abbreviations: MET - muscle energy technique; PNF - proprioceptive neuromuscular facilitation; NMR - neuromuscular re-education; a/p - anterior to posterior; p/a - posterior to anterior, FMP: functional movement patterns)   · Supine soft tissue mobilization to left plantar fascial and along borders of achilles tendon  · Prone superficial fascia mobilization to posterior left calf with FMP of ankle DF/PF    [facility] Portal  Treatment/Session Assessment:    · Response to Treatment:  Improved awareness of self mobilization and stretches, will benefit from therapy to improve overall mobility and gait. · Compliance with Program/Exercises: compliant all of the time. · Recommendations/Intent for next treatment session: \"Next visit will focus on advancements to more challenging activities\".   Total Treatment Duration: 60 minutes: 50 minutes evaluation, 10 minutes treatment  PT Patient Time In/Time Out  Time In: 1430  Time Out: [address] [facility] Baljeet Cost, PT

## 2018-08-28 ENCOUNTER — HOSPITAL ENCOUNTER (OUTPATIENT)
Dept: PHYSICAL THERAPY | Age: 53
Discharge: HOME | End: 2018-08-28
Payer: COMMERCIAL

## 2018-08-28 PROCEDURE — 97110 THERAPEUTIC EXERCISES: CPT

## 2018-08-28 PROCEDURE — 97140 MANUAL THERAPY 1/> REGIONS: CPT

## 2018-08-28 NOTE — PROGRESS NOTES
Ladan Jack  : 1965  Primary: [facility]  Secondary:  Therapy Center at [address]  [phone]   [phone]        OUTPATIENT PHYSICAL THERAPY:Daily Note 2018   ICD-10: Treatment Diagnosis: Plantar fasciitis (fibromatosis) M72.2  ICD-10: Treatment Diagnosis: Achilles tendonitis, left M76.62  ICD-10: Treatment Diagnosis: difficulty walking, not elsewhere classified R26.2  Precautions/Allergies:   Penicillin; Aciphex [rabeprazole]; and Augmentin [amoxicillin-pot clavulanate]   Fall Risk Score: 1 (? 5 = High Risk)  MD Orders: self referred MEDICAL/REFERRING DIAGNOSIS:  Foot pain [M79.673]   DATE OF ONSET: 2018  REFERRING PHYSICIAN: Referred, Self, MD  RETURN PHYSICIAN APPOINTMENT: as needed     INITIAL ASSESSMENT:  Ms. Elly Leiva is a 47 y.o. female who presents to physical therapy with insidious onset of left foot pain and tightness since early August. She is challenged with ambulation, sit to stand transfers and significant discomfort with first steps in the AM. She presents with symptoms of plantar fasciitis and minor achilles tendon tendonitis. She is traveling in the next two weeks which involves a significant amount of walking and would benefit from therapy services to improve overall mobility and function. PROBLEM LIST (Impacting functional limitations):  1. Decreased Strength  2. Decreased ADL/Functional Activities  3. Decreased Transfer Abilities  4. Decreased Ambulation Ability/Technique  5. Decreased Balance  6. Increased Pain  7. Decreased Activity Tolerance  8. Decreased Flexibility/Joint Mobility INTERVENTIONS PLANNED:  1. Balance Exercise  2. Gait Training  3. Home Exercise Program (HEP)  4. Manual Therapy  5. Neuromuscular Re-education/Strengthening  6. Range of Motion (ROM)  7. Therapeutic Activites  8.  Therapeutic Exercise/Strengthening   TREATMENT PLAN:  Effective Dates: 2018 TO 9/22/2018 (30 days). Frequency/Duration: 2 times a week for 30 Days  GOALS: (Goals have been discussed and agreed upon with patient.)  Discharge Goals: Time Frame: 30 days  1. Patient independent with her HEP without verbal cueing from therapist.  2. Patient able to ambulate for 1 hour with pain no more than 0-2/10  3. Patient able to perform weight bearing for 35 minutes with pain no more than 0-2/10. 4. Demonstrates understanding of correct footwear and self taping techniques for her upcoming travel, without verbal cueing from therapist.  Rehabilitation Potential For Stated Goals: Excellent              The information in this section was collected on 8/23/18 (except where otherwise noted). HISTORY:   History of Present Injury/Illness (Reason for Referral): Insidious onset of left foot pain, most discomfort in AM with first steps, sit to stand transfers after sitting prolonged and challenged with gait. If she wears a supportive shoe, less discomfort noted, not able to wear sandals. Patient is traveling to [place] in September and hoping therapy can help with her symptoms. Patient denies dizziness, drop attacks, numbness/tingling, bowel/bladder dysfunction and/or unexplained weight gain/loss. Past Medical History/Comorbidities:   Ms. Dylan Lara  has a past medical history of Allergic rhinitis, cause unspecified; Esophageal reflux; Essential hypertension, benign; Extrinsic asthma, unspecified; Generalized anxiety disorder; Hypothyroidism; Menopause; and Unspecified sleep apnea. Ms. Dylan Lara  has a past surgical history that includes hx cholecystectomy (1/07); hx back surgery; and hx gyn (12/10). Social History/Living Environment:     lives in a private home with her , challenged with stairs in home, walking barefoot.   Prior Level of Function/Work/Activity:  Works 3-4x a week, stairs and walks 1/2 mile distance to work office, recently challenged with left foot pain  Dominant Side:         RIGHT  Current Medications:       Current Outpatient Prescriptions:     lisinopril (PRINIVIL, ZESTRIL) 10 mg tablet, TAKE 1 TABLET BY MOUTH DAILY, Disp: 90 Tab, Rfl: 1    fluticasone-vilanterol (BREO ELLIPTA) 200-25 mcg/dose inhaler, Take 1 Puff by inhalation daily. , Disp: 1 Inhaler, Rfl: 5    montelukast (SINGULAIR) 10 mg tablet, TAKE ONE TABLET BY MOUTH EVERY DAY, Disp: 90 Tab, Rfl: 1    olopatadine (PATANOL) 0.1 % ophthalmic solution, Administer 2 Drops to both eyes two (2) times a day., Disp: 5 mL, Rfl: 5    B.infantis-B.ani-B.long-B.bifi (PROBIOTIC 4X) 10-15 mg TbEC, Take  by mouth., Disp: , Rfl:     omeprazole (PRILOSEC) 40 mg capsule, TAKE ONE CAPSULE BY MOUTH EVERY DAY, Disp: 30 Cap, Rfl: 5    PROAIR RESPICLICK 90 mcg/actuation aepb, INHALE ONE PUFF BY MOUTH EVERY 4 HOURS AS NEEDED, Disp: , Rfl: 5    fexofenadine (ALLEGRA) 180 mg tablet, Take  by mouth., Disp: , Rfl:     fluticasone (FLONASE) 50 mcg/actuation nasal spray, take 1 spray(s) intranasally once a day for 30 day(s), Disp: 1 Bottle, Rfl: 11    magnesium carbonate 54 mg/5 mL liqd, Take  by mouth., Disp: , Rfl:     albuterol (PROVENTIL HFA, VENTOLIN HFA, PROAIR HFA) 90 mcg/actuation inhaler, Take 2 Puffs by inhalation every four (4) hours as needed for Wheezing., Disp: 1 Inhaler, Rfl: 11   Date Last Reviewed:  8/28/2018     Number of Personal Factors/Comorbidities that affect the Plan of Care: 1-2: MODERATE COMPLEXITY   EXAMINATION:   Observation/Orthostatic Postural Assessment:  In WB, medial longitudinal arch is compensated supinated left. In WB, rearfoot appears left calcaneous valgus. Patient exhibits a increased lumbar lordosis and neutral thoracic kyphosis. Single leg stand exhibits challenged with left foot balance, 5 sec holds. Palpation of lower quarter bony landmarks are left iliac crest elevated, level greater trochanters. Palpation:  Soft tissue mobility through left foot/ankle is restricted in plantar fascia, along borders of Achilles tendon. Muscle length of left greater than right gastrocnemius is restricted in superficial to deep layers. Muscle length of left soleus is restricted. ROM:   Passive Accessory Mobility Testing: Talocrural mobility is restricted posterior glide. Subtalar mobility is restricted left. Midtarsal mobility restricted left. Strength:   Manual Muscle Testing (*/5) Right  Left   Plantarflexion 0-50 0-45   Dorsiflexion 0-10 0-5   Inversion 0-15 0-10   Eversion 0-15 0-10   Functional strength with standing heel raise is 3 inches on the R and 2 inches on the L. Special Tests:  Ligament stress tests including anterior drawer is negative; anterior talofibular is negative; calcaneofibular is negative; posterior talofibular is negative; deltoid ligament is negative; and tibia/fibular compression test is negative. Neurological Screen:  Myotomes: Key muscle strength testing for bilateral LE is intact. Dermatomes: Sensation testing through bilateral LE for light touch is intact. Reflexes: Patellar (L4) and achilles (S1) are 2+ and WFL. Neural tension tests: Slump test is negative   Functional Mobility:  Challenged with weight bearing and ambulation secondary to pain in left foot/leg       Body Structures Involved:  1. Bones  2. Joints  3. Muscles Body Functions Affected:  1. Sensory/Pain  2. Neuromusculoskeletal  3. Movement Related Activities and Participation Affected:  1. General Tasks and Demands  2. Mobility  3. Domestic Life  4. Community, Social and [place]   Number of elements (examined above) that affect the Plan of Care: 3: MODERATE COMPLEXITY   CLINICAL PRESENTATION:   Presentation: Evolving clinical presentation with changing clinical characteristics: MODERATE COMPLEXITY   CLINICAL DECISION MAKING:   Outcome Measure: Tool Used: PT/OT FOOT AND ANKLE ABILITY MEASURE  Score:  Initial: 54/84 Most Recent: X (Date: -- )   Interpretation of Score:  For the \"Activities of Daily Living\", there are 21 questions each scored on a 5 point scale with 0 representing \"Unable to do\" and 4 representing \"No difficulty\". The lower the score, the greater the functional disability. 84/84 represents no disability. Minimal detectable change is 5.7 points. With the addition of the 8 questions in the \"Sports Subscale,\" there are 29 questions, each scored on a 5 point scale with 0 representing \"Unable to do\" and 4 representing \"No difficulty\". The lower the score, the greater the functional disability. 116/116 represents no disability. Minimal detectable change is 12.3 points. Medical Necessity:   · Patient is expected to demonstrate progress in strength, range of motion, balance and coordination to increase independence with ambulation without pain in left foot and perform weight bearing activities without discomfort. .  Reason for Services/Other Comments:  · Patient continues to require skilled intervention due to challenged with ambulation and weight bearing secondary to pain and  discomfort in her left foot/leg. .   Use of outcome tool(s) and clinical judgement create a POC that gives a: Questionable prediction of patient's progress: MODERATE COMPLEXITY            TREATMENT:   (In addition to Assessment/Re-Assessment sessions the following treatments were rendered)  Pre-treatment Symptoms/Complaints:  Patient notes less tightness in foot since starting self stretches, however increased pain in low back since Friday. Notes she went to the chiropractor and he pushed on an area that did not feel good, also notes she wonders if she may have irritated her back with her walking compensations. Pain: Initial:   Pain Intensity 1: 7  Pain Location 1: Foot, Spine, lumbar  Pain Orientation 1: Left, Posterior  Post Session:  5/10     Therapeutic Exercise: (15 Minutes):  Exercises per grid below to improve mobility, strength, balance and coordination.   Required minimal verbal and manual cues to promote proper body alignment, promote proper body posture, promote proper body mechanics and promote proper body breathing techniques. Progressed resistance, range, repetitions and complexity of movement as indicated. Date:  8/28/2018   Activity/Exercise Parameters   Review self mobilization, stretching and foot ROM exercises X 5 minutes   Child pose X 2 minutes   Quadriped arch/sag X 2 minutes   Kinesiotape  Applied to left plantar fascia and posterior tibialis. Manual Therapy (    Soft Tissue Mobilization Duration  Duration: 45 Minutes): Manual techniques to facilitate improved motion and decreased pain. (Used abbreviations: MET - muscle energy technique; PNF - proprioceptive neuromuscular facilitation; NMR - neuromuscular re-education; a/p - anterior to posterior; p/a - posterior to anterior, FMP: functional movement patterns)   · Supine soft tissue mobilization to left plantar fascial and along borders of achilles tendon  · Prone superficial fascia mobilization to posterior left calf with FMP of ankle DF/PF  · Prone soft tissue mobilization to thoracic and lumbar spine paraspinals and along bony contours of lower border of rib cage with FMP of lower trunk rotation. [facility] Portal  Treatment/Session Assessment:    · Response to Treatment:  Decreased restrictions noted in lumbar spine, improving left foot/ankle mobility. Continues to be challenged with ambulation. · Compliance with Program/Exercises: compliant all of the time. · Recommendations/Intent for next treatment session: \"Next visit will focus on advancements to more challenging activities\".   Total Treatment Duration: 60 minutes  PT Patient Time In/Time Out  Time In: 0730  Time Out: [address] [facility] Svetlana Trivedi PT

## 2018-08-30 ENCOUNTER — HOSPITAL ENCOUNTER (OUTPATIENT)
Dept: PHYSICAL THERAPY | Age: 53
Discharge: HOME | End: 2018-08-30
Payer: COMMERCIAL

## 2018-08-30 PROCEDURE — 97140 MANUAL THERAPY 1/> REGIONS: CPT

## 2018-08-30 NOTE — PROGRESS NOTES
Ladan Jack  : 1965  Primary: [facility]  Secondary:  [facility] at [address]  [phone]   [phone]        OUTPATIENT PHYSICAL THERAPY:Daily Note 2018   ICD-10: Treatment Diagnosis: Plantar fasciitis (fibromatosis) M72.2  ICD-10: Treatment Diagnosis: Achilles tendonitis, left M76.62  ICD-10: Treatment Diagnosis: difficulty walking, not elsewhere classified R26.2  Precautions/Allergies:   Penicillin; Aciphex [rabeprazole]; and Augmentin [amoxicillin-pot clavulanate]   Fall Risk Score: 1 (? 5 = High Risk)  MD Orders: self referred MEDICAL/REFERRING DIAGNOSIS:  Foot pain [M79.673]   DATE OF ONSET: 2018  REFERRING PHYSICIAN: Referred, Self, MD  RETURN PHYSICIAN APPOINTMENT: as needed     INITIAL ASSESSMENT:  Ms. Elly Leiva is a 47 y.o. female who presents to physical therapy with insidious onset of left foot pain and tightness since early August. She is challenged with ambulation, sit to stand transfers and significant discomfort with first steps in the AM. She presents with symptoms of plantar fasciitis and minor achilles tendon tendonitis. She is traveling in the next two weeks which involves a significant amount of walking and would benefit from therapy services to improve overall mobility and function. PROBLEM LIST (Impacting functional limitations):  1. Decreased Strength  2. Decreased ADL/Functional Activities  3. Decreased Transfer Abilities  4. Decreased Ambulation Ability/Technique  5. Decreased Balance  6. Increased Pain  7. Decreased Activity Tolerance  8. Decreased Flexibility/Joint Mobility INTERVENTIONS PLANNED:  1. Balance Exercise  2. Gait Training  3. Home Exercise Program (HEP)  4. Manual Therapy  5. Neuromuscular Re-education/Strengthening  6. Range of Motion (ROM)  7. Therapeutic Activites  8.  Therapeutic Exercise/Strengthening   TREATMENT PLAN:  Effective Dates: 2018 TO 9/22/2018 (30 days). Frequency/Duration: 2 times a week for 30 Days  GOALS: (Goals have been discussed and agreed upon with patient.)  Discharge Goals: Time Frame: 30 days  1. Patient independent with her HEP without verbal cueing from therapist.  2. Patient able to ambulate for 1 hour with pain no more than 0-2/10  3. Patient able to perform weight bearing for 35 minutes with pain no more than 0-2/10. 4. Demonstrates understanding of correct footwear and self taping techniques for her upcoming travel, without verbal cueing from therapist.  Rehabilitation Potential For Stated Goals: Excellent              The information in this section was collected on 8/23/18 (except where otherwise noted). HISTORY:   History of Present Injury/Illness (Reason for Referral): Insidious onset of left foot pain, most discomfort in AM with first steps, sit to stand transfers after sitting prolonged and challenged with gait. If she wears a supportive shoe, less discomfort noted, not able to wear sandals. Patient is traveling to [place] in September and hoping therapy can help with her symptoms. Patient denies dizziness, drop attacks, numbness/tingling, bowel/bladder dysfunction and/or unexplained weight gain/loss. Past Medical History/Comorbidities:   Ms. Rudolph Ward  has a past medical history of Allergic rhinitis, cause unspecified; Esophageal reflux; Essential hypertension, benign; Extrinsic asthma, unspecified; Generalized anxiety disorder; Hypothyroidism; Menopause; and Unspecified sleep apnea. Ms. Rudolph Ward  has a past surgical history that includes hx cholecystectomy (1/07); hx back surgery; and hx gyn (12/10). Social History/Living Environment:     lives in a private home with her , challenged with stairs in home, walking barefoot.   Prior Level of Function/Work/Activity:  Works 3-4x a week, stairs and walks 1/2 mile distance to work office, recently challenged with left foot pain  Dominant Side:         RIGHT  Current Medications:       Current Outpatient Prescriptions:     lisinopril (PRINIVIL, ZESTRIL) 10 mg tablet, TAKE 1 TABLET BY MOUTH DAILY, Disp: 90 Tab, Rfl: 1    fluticasone-vilanterol (BREO ELLIPTA) 200-25 mcg/dose inhaler, Take 1 Puff by inhalation daily. , Disp: 1 Inhaler, Rfl: 5    montelukast (SINGULAIR) 10 mg tablet, TAKE ONE TABLET BY MOUTH EVERY DAY, Disp: 90 Tab, Rfl: 1    olopatadine (PATANOL) 0.1 % ophthalmic solution, Administer 2 Drops to both eyes two (2) times a day., Disp: 5 mL, Rfl: 5    B.infantis-B.ani-B.long-B.bifi (PROBIOTIC 4X) 10-15 mg TbEC, Take  by mouth., Disp: , Rfl:     omeprazole (PRILOSEC) 40 mg capsule, TAKE ONE CAPSULE BY MOUTH EVERY DAY, Disp: 30 Cap, Rfl: 5    PROAIR RESPICLICK 90 mcg/actuation aepb, INHALE ONE PUFF BY MOUTH EVERY 4 HOURS AS NEEDED, Disp: , Rfl: 5    fexofenadine (ALLEGRA) 180 mg tablet, Take  by mouth., Disp: , Rfl:     fluticasone (FLONASE) 50 mcg/actuation nasal spray, take 1 spray(s) intranasally once a day for 30 day(s), Disp: 1 Bottle, Rfl: 11    magnesium carbonate 54 mg/5 mL liqd, Take  by mouth., Disp: , Rfl:     albuterol (PROVENTIL HFA, VENTOLIN HFA, PROAIR HFA) 90 mcg/actuation inhaler, Take 2 Puffs by inhalation every four (4) hours as needed for Wheezing., Disp: 1 Inhaler, Rfl: 11   Date Last Reviewed:  8/30/2018     Number of Personal Factors/Comorbidities that affect the Plan of Care: 1-2: MODERATE COMPLEXITY   EXAMINATION:   Observation/Orthostatic Postural Assessment:  In WB, medial longitudinal arch is compensated supinated left. In WB, rearfoot appears left calcaneous valgus. Patient exhibits a increased lumbar lordosis and neutral thoracic kyphosis. Single leg stand exhibits challenged with left foot balance, 5 sec holds. Palpation of lower quarter bony landmarks are left iliac crest elevated, level greater trochanters. Palpation:  Soft tissue mobility through left foot/ankle is restricted in plantar fascia, along borders of Achilles tendon. Muscle length of left greater than right gastrocnemius is restricted in superficial to deep layers. Muscle length of left soleus is restricted. ROM:   Passive Accessory Mobility Testing: Talocrural mobility is restricted posterior glide. Subtalar mobility is restricted left. Midtarsal mobility restricted left. Strength:   Manual Muscle Testing (*/5) Right  Left   Plantarflexion 0-50 0-45   Dorsiflexion 0-10 0-5   Inversion 0-15 0-10   Eversion 0-15 0-10   Functional strength with standing heel raise is 3 inches on the R and 2 inches on the L. Special Tests:  Ligament stress tests including anterior drawer is negative; anterior talofibular is negative; calcaneofibular is negative; posterior talofibular is negative; deltoid ligament is negative; and tibia/fibular compression test is negative. Neurological Screen:  Myotomes: Key muscle strength testing for bilateral LE is intact. Dermatomes: Sensation testing through bilateral LE for light touch is intact. Reflexes: Patellar (L4) and achilles (S1) are 2+ and WFL. Neural tension tests: Slump test is negative   Functional Mobility:  Challenged with weight bearing and ambulation secondary to pain in left foot/leg       Body Structures Involved:  1. Bones  2. Joints  3. Muscles Body Functions Affected:  1. Sensory/Pain  2. Neuromusculoskeletal  3. Movement Related Activities and Participation Affected:  1. General Tasks and Demands  2. Mobility  3. Domestic Life  4. Community, Social and [place]   Number of elements (examined above) that affect the Plan of Care: 3: MODERATE COMPLEXITY   CLINICAL PRESENTATION:   Presentation: Evolving clinical presentation with changing clinical characteristics: MODERATE COMPLEXITY   CLINICAL DECISION MAKING:   Outcome Measure: Tool Used: PT/OT FOOT AND ANKLE ABILITY MEASURE  Score:  Initial: 54/84 Most Recent: X (Date: -- )   Interpretation of Score:  For the \"Activities of Daily Living\", there are 21 questions each scored on a 5 point scale with 0 representing \"Unable to do\" and 4 representing \"No difficulty\". The lower the score, the greater the functional disability. 84/84 represents no disability. Minimal detectable change is 5.7 points. With the addition of the 8 questions in the \"Sports Subscale,\" there are 29 questions, each scored on a 5 point scale with 0 representing \"Unable to do\" and 4 representing \"No difficulty\". The lower the score, the greater the functional disability. 116/116 represents no disability. Minimal detectable change is 12.3 points. Medical Necessity:   · Patient is expected to demonstrate progress in strength, range of motion, balance and coordination to increase independence with ambulation without pain in left foot and perform weight bearing activities without discomfort. .  Reason for Services/Other Comments:  · Patient continues to require skilled intervention due to challenged with ambulation and weight bearing secondary to pain and  discomfort in her left foot/leg. .   Use of outcome tool(s) and clinical judgement create a POC that gives a: Questionable prediction of patient's progress: MODERATE COMPLEXITY            TREATMENT:   (In addition to Assessment/Re-Assessment sessions the following treatments were rendered)  Pre-treatment Symptoms/Complaints:  Patient late to appointment secondary to traffic. Overall notes less pain in her left foot and wearing more supportive shoes. Pain: Initial:   Pain Intensity 1: 6  Pain Location 1: Foot  Pain Orientation 1: Left  Post Session:  5/10     Therapeutic Exercise: (5 Minutes):  Exercises per grid below to improve mobility, strength, balance and coordination. Required minimal verbal and manual cues to promote proper body alignment, promote proper body posture, promote proper body mechanics and promote proper body breathing techniques. Progressed resistance, range, repetitions and complexity of movement as indicated.    Date:  8/30/2018 Activity/Exercise Parameters   Review self mobilization, stretching and foot ROM exercises    Child pose X 2 minutes   Quadriped arch/sag    Kinesiotape  Applied to left plantar fascia and posterior tibialis. Patient education X 3 minute review of supportive shoes             Manual Therapy (    Soft Tissue Mobilization Duration  Duration: 25 Minutes): Manual techniques to facilitate improved motion and decreased pain. (Used abbreviations: MET - muscle energy technique; PNF - proprioceptive neuromuscular facilitation; NMR - neuromuscular re-education; a/p - anterior to posterior; p/a - posterior to anterior, FMP: functional movement patterns)   · Supine soft tissue mobilization to left plantar fascial and along borders of achilles tendon  · Prone superficial fascia mobilization to posterior left greater than right calf with FMP of ankle DF/PF      [facility] Portal  Treatment/Session Assessment:    · Response to Treatment:  Decreased soft tissue restrictions noted in bilateral calves and improved left ankle mobility with less tension noted in left plantar fascia. · Compliance with Program/Exercises: compliant all of the time. · Recommendations/Intent for next treatment session: \"Next visit will focus on advancements to more challenging activities\".   Total Treatment Duration: 30 minutes  PT Patient Time In/Time Out  Time In: 1600  Time Out: 5115 [address], PT

## 2018-09-05 ENCOUNTER — HOSPITAL ENCOUNTER (OUTPATIENT)
Dept: PHYSICAL THERAPY | Age: 53
Discharge: HOME | End: 2018-09-05
Payer: COMMERCIAL

## 2018-09-05 PROCEDURE — 97140 MANUAL THERAPY 1/> REGIONS: CPT

## 2018-09-05 PROCEDURE — 97110 THERAPEUTIC EXERCISES: CPT

## 2018-09-05 NOTE — PROGRESS NOTES
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 5 = High Risk)  MD Orders: self referred MEDICAL/REFERRING DIAGNOSIS:  Foot pain [M79.673]   DATE OF ONSET: 2018  REFERRING PHYSICIAN: Referred, Self, MD  RETURN PHYSICIAN APPOINTMENT: as needed     INITIAL ASSESSMENT:  Ms. Jatinder Branch is a 47 y.o. female who presents to physical therapy with insidious onset of left foot pain and tightness since early August. She is challenged with ambulation, sit to stand transfers and significant discomfort with first steps in the AM. She presents with symptoms of plantar fasciitis and minor achilles tendon tendonitis. She is traveling in the next two weeks which involves a significant amount of walking and would benefit from therapy services to improve overall mobility and function. PROBLEM LIST (Impacting functional limitations):  1. Decreased Strength  2. Decreased ADL/Functional Activities  3. Decreased Transfer Abilities  4. Decreased Ambulation Ability/Technique  5. Decreased Balance  6. Increased Pain  7. Decreased Activity Tolerance  8. Decreased Flexibility/Joint Mobility INTERVENTIONS PLANNED:  1. Balance Exercise  2. Gait Training  3. Home Exercise Program (HEP)  4. Manual Therapy  5. Neuromuscular Re-education/Strengthening  6. Range of Motion (ROM)  7. Therapeutic Activites  8.  Therapeutic Exercise/Strengthening   TREATMENT PLAN:  Effective Dates: 2018 TO 9/22/2018 (30 days). Frequency/Duration: 2 times a week for 30 Days  GOALS: (Goals have been discussed and agreed upon with patient.)  Discharge Goals: Time Frame: 30 days  1. Patient independent with her HEP without verbal cueing from therapist.  2. Patient able to ambulate for 1 hour with pain no more than 0-2/10  3. Patient able to perform weight bearing for 35 minutes with pain no more than 0-2/10. 4. Demonstrates understanding of correct footwear and self taping techniques for her upcoming travel, without verbal cueing from therapist.  Rehabilitation Potential For Stated Goals: Excellent              The information in this section was collected on 8/23/18 (except where otherwise noted). HISTORY:   History of Present Injury/Illness (Reason for Referral): Insidious onset of left foot pain, most discomfort in AM with first steps, sit to stand transfers after sitting prolonged and challenged with gait. If she wears a supportive shoe, less discomfort noted, not able to wear sandals. Patient is traveling to [place] in September and hoping therapy can help with her symptoms. Patient denies dizziness, drop attacks, numbness/tingling, bowel/bladder dysfunction and/or unexplained weight gain/loss. Past Medical History/Comorbidities:   Ms. Russ Curran  has a past medical history of Allergic rhinitis, cause unspecified; Esophageal reflux; Essential hypertension, benign; Extrinsic asthma, unspecified; Generalized anxiety disorder; Hypothyroidism; Menopause; and Unspecified sleep apnea. Ms. Russ Curran  has a past surgical history that includes hx cholecystectomy (1/07); hx back surgery; and hx gyn (12/10). Social History/Living Environment:     lives in a private home with her , challenged with stairs in home, walking barefoot.   Prior Level of Function/Work/Activity:  Works 3-4x a week, stairs and walks 1/2 mile distance to work office, recently challenged with left foot pain  Dominant Side:         RIGHT  Current Medications:       Current Outpatient Prescriptions:     lisinopril (PRINIVIL, ZESTRIL) 10 mg tablet, TAKE 1 TABLET BY MOUTH DAILY, Disp: 90 Tab, Rfl: 1    fluticasone-vilanterol (BREO ELLIPTA) 200-25 mcg/dose inhaler, Take 1 Puff by inhalation daily. , Disp: 1 Inhaler, Rfl: 5    montelukast (SINGULAIR) 10 mg tablet, TAKE ONE TABLET BY MOUTH EVERY DAY, Disp: 90 Tab, Rfl: 1    olopatadine (PATANOL) 0.1 % ophthalmic solution, Administer 2 Drops to both eyes two (2) times a day., Disp: 5 mL, Rfl: 5    B.infantis-B.ani-B.long-B.bifi (PROBIOTIC 4X) 10-15 mg TbEC, Take  by mouth., Disp: , Rfl:     omeprazole (PRILOSEC) 40 mg capsule, TAKE ONE CAPSULE BY MOUTH EVERY DAY, Disp: 30 Cap, Rfl: 5    PROAIR RESPICLICK 90 mcg/actuation aepb, INHALE ONE PUFF BY MOUTH EVERY 4 HOURS AS NEEDED, Disp: , Rfl: 5    fexofenadine (ALLEGRA) 180 mg tablet, Take  by mouth., Disp: , Rfl:     fluticasone (FLONASE) 50 mcg/actuation nasal spray, take 1 spray(s) intranasally once a day for 30 day(s), Disp: 1 Bottle, Rfl: 11    magnesium carbonate 54 mg/5 mL liqd, Take  by mouth., Disp: , Rfl:     albuterol (PROVENTIL HFA, VENTOLIN HFA, PROAIR HFA) 90 mcg/actuation inhaler, Take 2 Puffs by inhalation every four (4) hours as needed for Wheezing., Disp: 1 Inhaler, Rfl: 11   Date Last Reviewed:  9/5/2018     Number of Personal Factors/Comorbidities that affect the Plan of Care: 1-2: MODERATE COMPLEXITY   EXAMINATION:   Observation/Orthostatic Postural Assessment:  In WB, medial longitudinal arch is compensated supinated left. In WB, rearfoot appears left calcaneous valgus. Patient exhibits a increased lumbar lordosis and neutral thoracic kyphosis. Single leg stand exhibits challenged with left foot balance, 5 sec holds. Palpation of lower quarter bony landmarks are left iliac crest elevated, level greater trochanters. Palpation:  Soft tissue mobility through left foot/ankle is restricted in plantar fascia, along borders of Achilles tendon. Muscle length of left greater than right gastrocnemius is restricted in superficial to deep layers. Muscle length of left soleus is restricted. ROM:   Passive Accessory Mobility Testing: Talocrural mobility is restricted posterior glide. Subtalar mobility is restricted left. Midtarsal mobility restricted left. Strength:   Manual Muscle Testing (*/5) Right  Left   Plantarflexion 0-50 0-45   Dorsiflexion 0-10 0-5   Inversion 0-15 0-10   Eversion 0-15 0-10   Functional strength with standing heel raise is 3 inches on the R and 2 inches on the L. Special Tests:  Ligament stress tests including anterior drawer is negative; anterior talofibular is negative; calcaneofibular is negative; posterior talofibular is negative; deltoid ligament is negative; and tibia/fibular compression test is negative. Neurological Screen:  Myotomes: Key muscle strength testing for bilateral LE is intact. Dermatomes: Sensation testing through bilateral LE for light touch is intact. Reflexes: Patellar (L4) and achilles (S1) are 2+ and WFL. Neural tension tests: Slump test is negative   Functional Mobility:  Challenged with weight bearing and ambulation secondary to pain in left foot/leg       Body Structures Involved:  1. Bones  2. Joints  3. Muscles Body Functions Affected:  1. Sensory/Pain  2. Neuromusculoskeletal  3. Movement Related Activities and Participation Affected:  1. General Tasks and Demands  2. Mobility  3. Domestic Life  4. Community, Social and [place]   Number of elements (examined above) that affect the Plan of Care: 3: MODERATE COMPLEXITY   CLINICAL PRESENTATION:   Presentation: Evolving clinical presentation with changing clinical characteristics: MODERATE COMPLEXITY   CLINICAL DECISION MAKING:   Outcome Measure: Tool Used: PT/OT FOOT AND ANKLE ABILITY MEASURE  Score:  Initial: 54/84 Most Recent: X (Date: -- )   Interpretation of Score:  For the \"Activities of Daily Living\", there are 21 questions each scored on a 5 point scale with 0 representing \"Unable to do\" and 4 representing \"No difficulty\". The lower the score, the greater the functional disability. 84/84 represents no disability. Minimal detectable change is 5.7 points. With the addition of the 8 questions in the \"Sports Subscale,\" there are 29 questions, each scored on a 5 point scale with 0 representing \"Unable to do\" and 4 representing \"No difficulty\". The lower the score, the greater the functional disability. 116/116 represents no disability. Minimal detectable change is 12.3 points. Medical Necessity:   · Patient is expected to demonstrate progress in strength, range of motion, balance and coordination to increase independence with ambulation without pain in left foot and perform weight bearing activities without discomfort. .  Reason for Services/Other Comments:  · Patient continues to require skilled intervention due to challenged with ambulation and weight bearing secondary to pain and  discomfort in her left foot/leg. .   Use of outcome tool(s) and clinical judgement create a POC that gives a: Questionable prediction of patient's progress: MODERATE COMPLEXITY            TREATMENT:   (In addition to Assessment/Re-Assessment sessions the following treatments were rendered)  Pre-treatment Symptoms/Complaints:  Patient notes overall improvements with less irritation with first steps after sitting. Has been consistent with stretching. Pain: Initial:   Pain Intensity 1: 5  Pain Location 1: Foot  Pain Orientation 1: Left  Post Session:  3/10     Therapeutic Exercise: (20 Minutes):  Exercises per grid below to improve mobility, strength, balance and coordination. Required minimal verbal and manual cues to promote proper body alignment, promote proper body posture, promote proper body mechanics and promote proper body breathing techniques. Progressed resistance, range, repetitions and complexity of movement as indicated.    Date:  9/5/2018 Activity/Exercise Parameters   Review self mobilization, stretching and foot ROM exercises    Child pose X 2 minutes   Quadriped arch/sag    Kinesiotape  Applied to left plantar fascia and posterior tibialis. Patient education X 3 minute review of supportive shoes   Standing functional mobilization of tibia/talus X 2 minutes forward lunge. Manual Therapy (    Soft Tissue Mobilization Duration  Duration: 40 Minutes): Manual techniques to facilitate improved motion and decreased pain. (Used abbreviations: MET - muscle energy technique; PNF - proprioceptive neuromuscular facilitation; NMR - neuromuscular re-education; a/p - anterior to posterior; p/a - posterior to anterior, FMP: functional movement patterns)   · Supine soft tissue mobilization to left plantar fascial and along borders of achilles tendon  · Prone superficial fascia mobilization to posterior left greater than right calf with FMP of ankle DF/PF  · Supine subtalar distraction of left LE.         [facility] Portal  Treatment/Session Assessment:    · Response to Treatment:  Improved left foot mechanics and improved left ankle dorsiflexion. · Compliance with Program/Exercises: compliant all of the time. · Recommendations/Intent for next treatment session: \"Next visit will focus on advancements to more challenging activities\".   Total Treatment Duration: 60 minutes  PT Patient Time In/Time Out  Time In: 7395  Time Out: 7112 [address] Claudette List, PT

## 2018-09-06 ENCOUNTER — HOSPITAL ENCOUNTER (OUTPATIENT)
Dept: PHYSICAL THERAPY | Age: 53
Discharge: HOME | End: 2018-09-06
Payer: COMMERCIAL

## 2018-09-06 PROCEDURE — 97140 MANUAL THERAPY 1/> REGIONS: CPT

## 2018-09-06 PROCEDURE — 97110 THERAPEUTIC EXERCISES: CPT

## 2018-09-07 NOTE — PROGRESS NOTES
Janet Nascimento  : 1965  Primary: [facility]  Secondary:  [facility] at [address]  [phone]   [phone]        OUTPATIENT PHYSICAL THERAPY:Daily Note 2018   ICD-10: Treatment Diagnosis: Plantar fasciitis (fibromatosis) M72.2  ICD-10: Treatment Diagnosis: Achilles tendonitis, left M76.62  ICD-10: Treatment Diagnosis: difficulty walking, not elsewhere classified R26.2  Precautions/Allergies:   Penicillin; Aciphex [rabeprazole]; and Augmentin [amoxicillin-pot clavulanate]   Fall Risk Score: 1 (? 5 = High Risk)  MD Orders: self referred MEDICAL/REFERRING DIAGNOSIS:  Foot pain [M79.673]   DATE OF ONSET: 2018  REFERRING PHYSICIAN: Referred, Self, MD  RETURN PHYSICIAN APPOINTMENT: as needed     INITIAL ASSESSMENT:  Ms. Kenton Chavez is a 47 y.o. female who presents to physical therapy with insidious onset of left foot pain and tightness since early August. She is challenged with ambulation, sit to stand transfers and significant discomfort with first steps in the AM. She presents with symptoms of plantar fasciitis and minor achilles tendon tendonitis. She is traveling in the next two weeks which involves a significant amount of walking and would benefit from therapy services to improve overall mobility and function. PROBLEM LIST (Impacting functional limitations):  1. Decreased Strength  2. Decreased ADL/Functional Activities  3. Decreased Transfer Abilities  4. Decreased Ambulation Ability/Technique  5. Decreased Balance  6. Increased Pain  7. Decreased Activity Tolerance  8. Decreased Flexibility/Joint Mobility INTERVENTIONS PLANNED:  1. Balance Exercise  2. Gait Training  3. Home Exercise Program (HEP)  4. Manual Therapy  5. Neuromuscular Re-education/Strengthening  6. Range of Motion (ROM)  7. Therapeutic Activites  8.  Therapeutic Exercise/Strengthening   TREATMENT PLAN:  Effective Dates: 2018 TO 9/22/2018 (30 days). Frequency/Duration: 2 times a week for 30 Days  GOALS: (Goals have been discussed and agreed upon with patient.)  Discharge Goals: Time Frame: 30 days  1. Patient independent with her HEP without verbal cueing from therapist.  2. Patient able to ambulate for 1 hour with pain no more than 0-2/10  3. Patient able to perform weight bearing for 35 minutes with pain no more than 0-2/10. 4. Demonstrates understanding of correct footwear and self taping techniques for her upcoming travel, without verbal cueing from therapist.  Rehabilitation Potential For Stated Goals: Excellent              The information in this section was collected on 8/23/18 (except where otherwise noted). HISTORY:   History of Present Injury/Illness (Reason for Referral): Insidious onset of left foot pain, most discomfort in AM with first steps, sit to stand transfers after sitting prolonged and challenged with gait. If she wears a supportive shoe, less discomfort noted, not able to wear sandals. Patient is traveling to [place] in September and hoping therapy can help with her symptoms. Patient denies dizziness, drop attacks, numbness/tingling, bowel/bladder dysfunction and/or unexplained weight gain/loss. Past Medical History/Comorbidities:   Ms. Dylan Lara  has a past medical history of Allergic rhinitis, cause unspecified; Esophageal reflux; Essential hypertension, benign; Extrinsic asthma, unspecified; Generalized anxiety disorder; Hypothyroidism; Menopause; and Unspecified sleep apnea. Ms. Dylan Lara  has a past surgical history that includes hx cholecystectomy (1/07); hx back surgery; and hx gyn (12/10). Social History/Living Environment:     lives in a private home with her , challenged with stairs in home, walking barefoot.   Prior Level of Function/Work/Activity:  Works 3-4x a week, stairs and walks 1/2 mile distance to work office, recently challenged with left foot pain  Dominant Side:         RIGHT  Current Medications:       Current Outpatient Prescriptions:     lisinopril (PRINIVIL, ZESTRIL) 10 mg tablet, TAKE 1 TABLET BY MOUTH DAILY, Disp: 90 Tab, Rfl: 1    fluticasone-vilanterol (BREO ELLIPTA) 200-25 mcg/dose inhaler, Take 1 Puff by inhalation daily. , Disp: 1 Inhaler, Rfl: 5    montelukast (SINGULAIR) 10 mg tablet, TAKE ONE TABLET BY MOUTH EVERY DAY, Disp: 90 Tab, Rfl: 1    olopatadine (PATANOL) 0.1 % ophthalmic solution, Administer 2 Drops to both eyes two (2) times a day., Disp: 5 mL, Rfl: 5    B.infantis-B.ani-B.long-B.bifi (PROBIOTIC 4X) 10-15 mg TbEC, Take  by mouth., Disp: , Rfl:     omeprazole (PRILOSEC) 40 mg capsule, TAKE ONE CAPSULE BY MOUTH EVERY DAY, Disp: 30 Cap, Rfl: 5    PROAIR RESPICLICK 90 mcg/actuation aepb, INHALE ONE PUFF BY MOUTH EVERY 4 HOURS AS NEEDED, Disp: , Rfl: 5    fexofenadine (ALLEGRA) 180 mg tablet, Take  by mouth., Disp: , Rfl:     fluticasone (FLONASE) 50 mcg/actuation nasal spray, take 1 spray(s) intranasally once a day for 30 day(s), Disp: 1 Bottle, Rfl: 11    magnesium carbonate 54 mg/5 mL liqd, Take  by mouth., Disp: , Rfl:     albuterol (PROVENTIL HFA, VENTOLIN HFA, PROAIR HFA) 90 mcg/actuation inhaler, Take 2 Puffs by inhalation every four (4) hours as needed for Wheezing., Disp: 1 Inhaler, Rfl: 11   Date Last Reviewed:  9/6/2018     Number of Personal Factors/Comorbidities that affect the Plan of Care: 1-2: MODERATE COMPLEXITY   EXAMINATION:   Observation/Orthostatic Postural Assessment:  In WB, medial longitudinal arch is compensated supinated left. In WB, rearfoot appears left calcaneous valgus. Patient exhibits a increased lumbar lordosis and neutral thoracic kyphosis. Single leg stand exhibits challenged with left foot balance, 5 sec holds. Palpation of lower quarter bony landmarks are left iliac crest elevated, level greater trochanters. Palpation:  Soft tissue mobility through left foot/ankle is restricted in plantar fascia, along borders of Achilles tendon. Muscle length of left greater than right gastrocnemius is restricted in superficial to deep layers. Muscle length of left soleus is restricted. ROM:   Passive Accessory Mobility Testing: Talocrural mobility is restricted posterior glide. Subtalar mobility is restricted left. Midtarsal mobility restricted left. Strength:   Manual Muscle Testing (*/5) Right  Left   Plantarflexion 0-50 0-45   Dorsiflexion 0-10 0-5   Inversion 0-15 0-10   Eversion 0-15 0-10   Functional strength with standing heel raise is 3 inches on the R and 2 inches on the L. Special Tests:  Ligament stress tests including anterior drawer is negative; anterior talofibular is negative; calcaneofibular is negative; posterior talofibular is negative; deltoid ligament is negative; and tibia/fibular compression test is negative. Neurological Screen:  Myotomes: Key muscle strength testing for bilateral LE is intact. Dermatomes: Sensation testing through bilateral LE for light touch is intact. Reflexes: Patellar (L4) and achilles (S1) are 2+ and WFL. Neural tension tests: Slump test is negative   Functional Mobility:  Challenged with weight bearing and ambulation secondary to pain in left foot/leg       Body Structures Involved:  1. Bones  2. Joints  3. Muscles Body Functions Affected:  1. Sensory/Pain  2. Neuromusculoskeletal  3. Movement Related Activities and Participation Affected:  1. General Tasks and Demands  2. Mobility  3. Domestic Life  4. Community, Social and [place]   Number of elements (examined above) that affect the Plan of Care: 3: MODERATE COMPLEXITY   CLINICAL PRESENTATION:   Presentation: Evolving clinical presentation with changing clinical characteristics: MODERATE COMPLEXITY   CLINICAL DECISION MAKING:   Outcome Measure: Tool Used: PT/OT FOOT AND ANKLE ABILITY MEASURE  Score:  Initial: 54/84 Most Recent: X (Date: -- )   Interpretation of Score:  For the \"Activities of Daily Living\", there are 21 questions each scored on a 5 point scale with 0 representing \"Unable to do\" and 4 representing \"No difficulty\". The lower the score, the greater the functional disability. 84/84 represents no disability. Minimal detectable change is 5.7 points. With the addition of the 8 questions in the \"Sports Subscale,\" there are 29 questions, each scored on a 5 point scale with 0 representing \"Unable to do\" and 4 representing \"No difficulty\". The lower the score, the greater the functional disability. 116/116 represents no disability. Minimal detectable change is 12.3 points. Medical Necessity:   · Patient is expected to demonstrate progress in strength, range of motion, balance and coordination to increase independence with ambulation without pain in left foot and perform weight bearing activities without discomfort. .  Reason for Services/Other Comments:  · Patient continues to require skilled intervention due to challenged with ambulation and weight bearing secondary to pain and  discomfort in her left foot/leg. .   Use of outcome tool(s) and clinical judgement create a POC that gives a: Questionable prediction of patient's progress: MODERATE COMPLEXITY            TREATMENT:   (In addition to Assessment/Re-Assessment sessions the following treatments were rendered)  Pre-treatment Symptoms/Complaints:  Patient notes she is walking better and has found more supportive shoes to wear. She is traveling over the next two weeks and will return to therapy if needed when she returns. Pain: Initial:   Pain Intensity 1: 5  Pain Location 1: Foot  Pain Orientation 1: Left  Post Session:  3/10     Therapeutic Exercise: (20 Minutes):  Exercises per grid below to improve mobility, strength, balance and coordination. Required minimal verbal and manual cues to promote proper body alignment, promote proper body posture, promote proper body mechanics and promote proper body breathing techniques.   Progressed resistance, range, repetitions and complexity of movement as indicated. Date:  9/6/2018   Activity/Exercise Parameters   Review self mobilization, stretching and foot ROM exercises    Child pose X 2 minutes   Quadriped arch/sag    Kinesiotape  Applied to left plantar fascia and posterior tibialis. Patient education X 3 minute review of supportive shoes  X 5 minute review of HEP and travel tips   Standing functional mobilization of tibia/talus X 2 minutes forward lunge. Manual Therapy (    Soft Tissue Mobilization Duration  Duration: 40 Minutes): Manual techniques to facilitate improved motion and decreased pain. (Used abbreviations: MET - muscle energy technique; PNF - proprioceptive neuromuscular facilitation; NMR - neuromuscular re-education; a/p - anterior to posterior; p/a - posterior to anterior, FMP: functional movement patterns)   · Supine soft tissue mobilization to left plantar fascial and along borders of achilles tendon  · Prone superficial fascia mobilization to posterior left greater than right calf with FMP of ankle DF/PF  · Supine subtalar distraction of left LE.         [facility] Portal  Treatment/Session Assessment:    · Response to Treatment:  Improving mobility in left LE, decreased restrictions noted in plantar fascia and gastroc/soleus. .   · Compliance with Program/Exercises: compliant all of the time. · Recommendations/Intent for next treatment session: \"Next visit will focus on advancements to more challenging activities\".   Total Treatment Duration: 60 minutes  PT Patient Time In/Time Out  Time In: 1430  Time Out: [address] PT

## 2018-09-24 PROBLEM — E66.01 SEVERE OBESITY (BMI 35.0-39.9): Status: ACTIVE | Noted: 2018-09-24

## 2018-09-24 PROBLEM — G47.25: Status: ACTIVE | Noted: 2018-09-24

## 2018-10-10 ENCOUNTER — APPOINTMENT (RX ONLY)
Dept: URBAN - METROPOLITAN AREA CLINIC 349 | Facility: CLINIC | Age: 53
Setting detail: DERMATOLOGY
End: 2018-10-10

## 2018-10-10 DIAGNOSIS — Z87.2 PERSONAL HISTORY OF DISEASES OF THE SKIN AND SUBCUTANEOUS TISSUE: ICD-10-CM

## 2018-10-10 DIAGNOSIS — D22 MELANOCYTIC NEVI: ICD-10-CM | Status: STABLE

## 2018-10-10 PROBLEM — D22.5 MELANOCYTIC NEVI OF TRUNK: Status: ACTIVE | Noted: 2018-10-10

## 2018-10-10 PROCEDURE — ? MEDICAL PHOTOGRAPHY REVIEW

## 2018-10-10 PROCEDURE — ? BODY PHOTOGRAPHY

## 2018-10-10 PROCEDURE — ? COUNSELING

## 2018-10-10 PROCEDURE — 99213 OFFICE O/P EST LOW 20 MIN: CPT

## 2018-10-10 ASSESSMENT — LOCATION DETAILED DESCRIPTION DERM
LOCATION DETAILED: RIGHT ANTERIOR PROXIMAL THIGH
LOCATION DETAILED: RIGHT LATERAL ABDOMEN
LOCATION DETAILED: RIGHT INFERIOR MEDIAL UPPER BACK
LOCATION DETAILED: LEFT ANTERIOR PROXIMAL THIGH
LOCATION DETAILED: LEFT SUPERIOR MEDIAL MIDBACK
LOCATION DETAILED: RIGHT INFERIOR MEDIAL MIDBACK

## 2018-10-10 ASSESSMENT — LOCATION ZONE DERM
LOCATION ZONE: TRUNK
LOCATION ZONE: LEG

## 2018-10-10 ASSESSMENT — LOCATION SIMPLE DESCRIPTION DERM
LOCATION SIMPLE: ABDOMEN
LOCATION SIMPLE: LEFT THIGH
LOCATION SIMPLE: RIGHT UPPER BACK
LOCATION SIMPLE: RIGHT LOWER BACK
LOCATION SIMPLE: RIGHT THIGH
LOCATION SIMPLE: LEFT LOWER BACK

## 2018-10-10 NOTE — PROCEDURE: BODY PHOTOGRAPHY
Reason For Photography: The patient is obtaining body photography to observe existing suspicious moles and or monitor for the appearance of any new lesions.
Detail Level: Generalized
Number Of Photographs (Optional- Will Not Render If 0): 2
Consent: Written consent obtained, risks reviewed for whole body photography. Patient understands that photograph costs may not be covered by insurance, and patient is ultimately responsible for payment.
Whole Body Statement: The whole body was photographed today.
Was The Entire Body Photographed (Cannot Bill Unless Entire Body Photographed)?: No

## 2018-10-19 ENCOUNTER — HOSPITAL ENCOUNTER (OUTPATIENT)
Dept: PHYSICAL THERAPY | Age: 53
Discharge: HOME | End: 2018-10-19
Payer: COMMERCIAL

## 2018-10-19 PROCEDURE — 97140 MANUAL THERAPY 1/> REGIONS: CPT

## 2018-10-19 PROCEDURE — 97110 THERAPEUTIC EXERCISES: CPT

## 2018-10-19 NOTE — THERAPY RECERTIFICATION
Trisha Pink  : 1965  Primary: [facility]  Secondary:  Therapy Center at [address]  [phone]   [phone]        OUTPATIENT PHYSICAL THERAPY:Daily Note and Recertification    ICD-10: Treatment Diagnosis: Plantar fasciitis (fibromatosis) M72.2  ICD-10: Treatment Diagnosis: Achilles tendonitis, left M76.62  ICD-10: Treatment Diagnosis: difficulty walking, not elsewhere classified R26.2  Precautions/Allergies:   Penicillin; Aciphex [rabeprazole]; and Augmentin [amoxicillin-pot clavulanate]   Fall Risk Score: 1 (? 5 = High Risk)  MD Orders: self referred MEDICAL/REFERRING DIAGNOSIS:  Plantar fascial fibromatosis [M72.2]   DATE OF ONSET: 2018  REFERRING PHYSICIAN: Isabela Davenport MD  RETURN PHYSICIAN APPOINTMENT: as needed   RE-CERTIFICATION: /: Ms. Keerthi Mccallum attended therapy before her recent trip to [place] to help with her left plantar fasciitis. She presents with continued soft tissue restrictions in her left LE. She is challenged with ambulation and prolonged weight bearing. She would benefit from continued therapy services to improve overall mobility and ability to perform daily functional tasks. INITIAL ASSESSMENT:  Ms. Keerthi Mccallum is a 47 y.o. female who presents to physical therapy with insidious onset of left foot pain and tightness since early August. She is challenged with ambulation, sit to stand transfers and significant discomfort with first steps in the AM. She presents with symptoms of plantar fasciitis and minor achilles tendon tendonitis. She is traveling in the next two weeks which involves a significant amount of walking and would benefit from therapy services to improve overall mobility and function. PROBLEM LIST (Impacting functional limitations):  1. Decreased Strength  2. Decreased ADL/Functional Activities  3. Decreased Transfer Abilities  4.  Decreased Ambulation Ability/Technique  5. Decreased Balance  6. Increased Pain  7. Decreased Activity Tolerance  8. Decreased Flexibility/Joint Mobility INTERVENTIONS PLANNED:  1. Balance Exercise  2. Gait Training  3. Home Exercise Program (HEP)  4. Manual Therapy  5. Neuromuscular Re-education/Strengthening  6. Range of Motion (ROM)  7. Therapeutic Activites  8. Therapeutic Exercise/Strengthening   TREATMENT PLAN:  Effective Dates: 10/19/18 TO 1/17/2019. Frequency/Duration: 2 times a week for 90 Days  GOALS: (Goals have been discussed and agreed upon with patient.)  Discharge Goals: Time Frame: 90 days  1. Patient independent with her HEP without verbal cueing from therapist. GOAL MET  2. Patient able to ambulate for 1 hour with pain no more than 0-2/10  ONGOING  3. Patient able to perform weight bearing for 35 minutes with pain no more than 0-2/10. ONGOING  4. Demonstrates understanding of correct footwear and self taping techniques for her upcoming travel, without verbal cueing from therapist. GOAL MET  5. Patient demonstrates ability to ambulate 3 miles without onset of left foot pain. - ONGOING  Rehabilitation Potential For Stated Goals: Excellent    Regarding Denita Ellerjarrett Daly's therapy, I certify that the treatment plan above will be carried out by a therapist or under their direction. Thank you for this referral,  Robert Thompson PT, DPT, CFMT    Referring Physician Signature: Shankar Eastman MD         Date:                  The information in this section was collected on 8/23/18 (except where otherwise noted). HISTORY:   History of Present Injury/Illness (Reason for Referral): Insidious onset of left foot pain, most discomfort in AM with first steps, sit to stand transfers after sitting prolonged and challenged with gait. If she wears a supportive shoe, less discomfort noted, not able to wear sandals. Patient is traveling to [place] in September and hoping therapy can help with her symptoms.  Patient denies dizziness, drop attacks, numbness/tingling, bowel/bladder dysfunction and/or unexplained weight gain/loss. Past Medical History/Comorbidities:   Ms. Josh Tyler  has a past medical history of Allergic rhinitis, cause unspecified, Esophageal reflux, Essential hypertension, benign, Extrinsic asthma, unspecified, Generalized anxiety disorder, Hypothyroidism, Menopause, and Unspecified sleep apnea. Ms. Josh Tyler  has a past surgical history that includes hx cholecystectomy (1/07); hx back surgery; and hx gyn (12/10). Social History/Living Environment:     lives in a private home with her , challenged with stairs in home, walking barefoot. Prior Level of Function/Work/Activity:  Works 3-4x a week, stairs and walks 1/2 mile distance to work office, recently challenged with left foot pain  Dominant Side:         RIGHT  Current Medications:       Current Outpatient Medications:     melatonin 3 mg tablet, Take 1 Tab by mouth nightly., Disp: 30 Tab, Rfl: 3    lisinopril (PRINIVIL, ZESTRIL) 10 mg tablet, TAKE 1 TABLET BY MOUTH DAILY, Disp: 90 Tab, Rfl: 1    fluticasone-vilanterol (BREO ELLIPTA) 200-25 mcg/dose inhaler, Take 1 Puff by inhalation daily. , Disp: 1 Inhaler, Rfl: 5    montelukast (SINGULAIR) 10 mg tablet, TAKE ONE TABLET BY MOUTH EVERY DAY, Disp: 90 Tab, Rfl: 1    olopatadine (PATANOL) 0.1 % ophthalmic solution, Administer 2 Drops to both eyes two (2) times a day., Disp: 5 mL, Rfl: 5    B.infantis-B.ani-B.long-B.bifi (PROBIOTIC 4X) 10-15 mg TbEC, Take  by mouth., Disp: , Rfl:     omeprazole (PRILOSEC) 40 mg capsule, TAKE ONE CAPSULE BY MOUTH EVERY DAY, Disp: 30 Cap, Rfl: 5    PROAIR RESPICLICK 90 mcg/actuation aepb, INHALE ONE PUFF BY MOUTH EVERY 4 HOURS AS NEEDED, Disp: , Rfl: 5    fexofenadine (ALLEGRA) 180 mg tablet, Take  by mouth., Disp: , Rfl:     fluticasone (FLONASE) 50 mcg/actuation nasal spray, take 1 spray(s) intranasally once a day for 30 day(s), Disp: 1 Bottle, Rfl: 11    albuterol (PROVENTIL HFA, VENTOLIN HFA, PROAIR HFA) 90 mcg/actuation inhaler, Take 2 Puffs by inhalation every four (4) hours as needed for Wheezing., Disp: 1 Inhaler, Rfl: 11   Date Last Reviewed:  10/19/2018     Number of Personal Factors/Comorbidities that affect the Plan of Care: 1-2: MODERATE COMPLEXITY   EXAMINATION:   Observation/Orthostatic Postural Assessment:  In WB, medial longitudinal arch is compensated supinated left. In WB, rearfoot appears left calcaneous valgus. Patient exhibits a increased lumbar lordosis and neutral thoracic kyphosis. Single leg stand exhibits challenged with left foot balance, 5 sec holds. Palpation of lower quarter bony landmarks are left iliac crest elevated, level greater trochanters. Palpation:  Soft tissue mobility through left foot/ankle is restricted in plantar fascia, along borders of Achilles tendon. Improving 10/19/18 Muscle length of left greater than right gastrocnemius is restricted in superficial to deep layers. Improving 10/19/18 Muscle length of left soleus is restricted. Improving 10/19/18  ROM:   Passive Accessory Mobility Testing: Talocrural mobility is restricted posterior glide. Subtalar mobility is restricted left. Improving 10/19/18 Midtarsal mobility restricted left. Strength:   Manual Muscle Testing (*/5) Right  Left   Plantarflexion 0-50 0-48   Dorsiflexion 0-10 0-8   Inversion 0-15 0-12   Eversion 0-15 0-12   Functional strength with standing heel raise is 3 inches on the R and 2 inches on the L. Special Tests:  Ligament stress tests including anterior drawer is negative; anterior talofibular is negative; calcaneofibular is negative; posterior talofibular is negative; deltoid ligament is negative; and tibia/fibular compression test is negative. Neurological Screen:  Myotomes: Key muscle strength testing for bilateral LE is intact. Dermatomes: Sensation testing through bilateral LE for light touch is intact. Reflexes: Patellar (L4) and achilles (S1) are 2+ and WFL.    Neural tension tests: Slump test is negative   Functional Mobility:  Challenged with weight bearing and ambulation secondary to pain in left foot/leg       Body Structures Involved:  1. Bones  2. Joints  3. Muscles Body Functions Affected:  1. Sensory/Pain  2. Neuromusculoskeletal  3. Movement Related Activities and Participation Affected:  1. General Tasks and Demands  2. Mobility  3. Domestic Life  4. Community, Social and [place]   Number of elements (examined above) that affect the Plan of Care: 3: MODERATE COMPLEXITY   CLINICAL PRESENTATION:   Presentation: Evolving clinical presentation with changing clinical characteristics: MODERATE COMPLEXITY   CLINICAL DECISION MAKING:   Outcome Measure: Tool Used: PT/OT FOOT AND ANKLE ABILITY MEASURE  Score:  Initial: 54/84 Most Recent: X (Date: -- )   Interpretation of Score: For the \"Activities of Daily Living\", there are 21 questions each scored on a 5 point scale with 0 representing \"Unable to do\" and 4 representing \"No difficulty\". The lower the score, the greater the functional disability. 84/84 represents no disability. Minimal detectable change is 5.7 points. With the addition of the 8 questions in the \"Sports Subscale,\" there are 29 questions, each scored on a 5 point scale with 0 representing \"Unable to do\" and 4 representing \"No difficulty\". The lower the score, the greater the functional disability. 116/116 represents no disability. Minimal detectable change is 12.3 points. Medical Necessity:   · Patient is expected to demonstrate progress in strength, range of motion, balance and coordination to increase independence with ambulation without pain in left foot and perform weight bearing activities without discomfort. .  Reason for Services/Other Comments:  · Patient continues to require skilled intervention due to challenged with ambulation and weight bearing secondary to pain and  discomfort in her left foot/leg. .   Use of outcome tool(s) and clinical judgement create a POC that gives a: Questionable prediction of patient's progress: MODERATE COMPLEXITY            TREATMENT:   (In addition to Assessment/Re-Assessment sessions the following treatments were rendered)  Pre-treatment Symptoms/Complaints:  Patient notes she was able to travel with little to no discomfort for two weeks, when she returned, increased pain noted in her left foot again. Has not been able to wear shoes that are comfortable and limited with how much she is able to walk secondary to pain levels. Pain: Initial:   Pain Intensity 1: 6  Pain Location 1: Foot  Pain Orientation 1: Left  Post Session:  3/10     Therapeutic Exercise: (20 Minutes):  Exercises per grid below to improve mobility, strength, balance and coordination. Required minimal verbal and manual cues to promote proper body alignment, promote proper body posture, promote proper body mechanics and promote proper body breathing techniques. Progressed resistance, range, repetitions and complexity of movement as indicated. Date:  10/19/2018   Activity/Exercise Parameters   Review self mobilization, stretching and foot ROM exercises X 5 minutes   Child pose X 2 minutes   Quadriped arch/sag    Kinesiotape  Applied to left plantar fascia and posterior tibialis. Patient education   X 5 minute review of HEP    Standing functional mobilization of tibia/talus X 2 minutes forward lunge. Manual Therapy (    Soft Tissue Mobilization Duration  Duration: 40 Minutes): Manual techniques to facilitate improved motion and decreased pain.  (Used abbreviations: MET - muscle energy technique; PNF - proprioceptive neuromuscular facilitation; NMR - neuromuscular re-education; a/p - anterior to posterior; p/a - posterior to anterior, FMP: functional movement patterns)   · Supine soft tissue mobilization to left plantar fascial and along borders of achilles tendon  · Prone superficial fascia mobilization to posterior left greater than right calf with FMP of ankle DF/PF  · Supine subtalar distraction of left LE.         [employer]  Treatment/Session Assessment:    · Response to Treatment:  Benefits from soft tissue mobilization to left foot and leg, improved flexibility and ankle ROM at end of session. · Compliance with Program/Exercises: compliant all of the time. · Recommendations/Intent for next treatment session: \"Next visit will focus on advancements to more challenging activities\".   Total Treatment Duration: 60 minutes  PT Patient Time In/Time Out  Time In: 1030  Time Out: [address], PT

## 2018-10-23 ENCOUNTER — HOSPITAL ENCOUNTER (OUTPATIENT)
Dept: PHYSICAL THERAPY | Age: 53
Discharge: HOME | End: 2018-10-23
Payer: COMMERCIAL

## 2018-10-23 PROCEDURE — 97140 MANUAL THERAPY 1/> REGIONS: CPT

## 2018-10-23 PROCEDURE — 97110 THERAPEUTIC EXERCISES: CPT

## 2018-10-24 ENCOUNTER — HOSPITAL ENCOUNTER (OUTPATIENT)
Dept: PHYSICAL THERAPY | Age: 53
Discharge: HOME | End: 2018-10-24
Payer: COMMERCIAL

## 2018-10-24 PROCEDURE — 97140 MANUAL THERAPY 1/> REGIONS: CPT

## 2018-10-24 PROCEDURE — 97110 THERAPEUTIC EXERCISES: CPT

## 2018-10-24 NOTE — PROGRESS NOTES
[facility], [place], operated by [facility]  : 1965  Primary: Cand[facility] Spence*  Secondary:  Therapy Center at [address]  [phone]   [phone]        OUTPATIENT PHYSICAL THERAPY:Daily Note 10/23/2018   ICD-10: Treatment Diagnosis: Plantar fasciitis (fibromatosis) M72.2  ICD-10: Treatment Diagnosis: Achilles tendonitis, left M76.62  ICD-10: Treatment Diagnosis: difficulty walking, not elsewhere classified R26.2  Precautions/Allergies:   Penicillin; Aciphex [rabeprazole]; and Augmentin [amoxicillin-pot clavulanate]   Fall Risk Score: 1 (? 5 = High Risk)  MD Orders: self referred MEDICAL/REFERRING DIAGNOSIS:  Plantar fascial fibromatosis [M72.2]   DATE OF ONSET: 2018  REFERRING PHYSICIAN: Everardo Ramos MD  RETURN PHYSICIAN APPOINTMENT: as needed   RE-CERTIFICATION: : Ms. Brent Martinez attended therapy before her recent trip to [place] to help with her left plantar fasciitis. She presents with continued soft tissue restrictions in her left LE. She is challenged with ambulation and prolonged weight bearing. She would benefit from continued therapy services to improve overall mobility and ability to perform daily functional tasks. INITIAL ASSESSMENT:  Ms. Brent Martinez is a 47 y.o. female who presents to physical therapy with insidious onset of left foot pain and tightness since early August. She is challenged with ambulation, sit to stand transfers and significant discomfort with first steps in the AM. She presents with symptoms of plantar fasciitis and minor achilles tendon tendonitis. She is traveling in the next two weeks which involves a significant amount of walking and would benefit from therapy services to improve overall mobility and function. PROBLEM LIST (Impacting functional limitations):  1. Decreased Strength  2. Decreased ADL/Functional Activities  3. Decreased Transfer Abilities  4.  Decreased Ambulation Ability/Technique  5. Decreased Balance  6. Increased Pain  7. Decreased Activity Tolerance  8. Decreased Flexibility/Joint Mobility INTERVENTIONS PLANNED:  1. Balance Exercise  2. Gait Training  3. Home Exercise Program (HEP)  4. Manual Therapy  5. Neuromuscular Re-education/Strengthening  6. Range of Motion (ROM)  7. Therapeutic Activites  8. Therapeutic Exercise/Strengthening   TREATMENT PLAN:  Effective Dates: 10/19/18 TO 1/17/2019. Frequency/Duration: 2 times a week for 90 Days  GOALS: (Goals have been discussed and agreed upon with patient.)  Discharge Goals: Time Frame: 90 days  1. Patient independent with her HEP without verbal cueing from therapist. GOAL MET  2. Patient able to ambulate for 1 hour with pain no more than 0-2/10  ONGOING  3. Patient able to perform weight bearing for 35 minutes with pain no more than 0-2/10. ONGOING  4. Demonstrates understanding of correct footwear and self taping techniques for her upcoming travel, without verbal cueing from therapist. GOAL MET  5. Patient demonstrates ability to ambulate 3 miles without onset of left foot pain. - ONGOING  Rehabilitation Potential For Stated Goals: Excellent              The information in this section was collected on 8/23/18 (except where otherwise noted). HISTORY:   History of Present Injury/Illness (Reason for Referral): Insidious onset of left foot pain, most discomfort in AM with first steps, sit to stand transfers after sitting prolonged and challenged with gait. If she wears a supportive shoe, less discomfort noted, not able to wear sandals. Patient is traveling to [place] in September and hoping therapy can help with her symptoms. Patient denies dizziness, drop attacks, numbness/tingling, bowel/bladder dysfunction and/or unexplained weight gain/loss.     Past Medical History/Comorbidities:   Ms. Justin Hurtado  has a past medical history of Allergic rhinitis, cause unspecified, Esophageal reflux, Essential hypertension, benign, Extrinsic asthma, unspecified, Generalized anxiety disorder, Hypothyroidism, Menopause, and Unspecified sleep apnea. Ms. Keerthi Mccallum  has a past surgical history that includes hx cholecystectomy (1/07); hx back surgery; and hx gyn (12/10). Social History/Living Environment:     lives in a private home with her , challenged with stairs in home, walking barefoot. Prior Level of Function/Work/Activity:  Works 3-4x a week, stairs and walks 1/2 mile distance to work office, recently challenged with left foot pain  Dominant Side:         RIGHT  Current Medications:       Current Outpatient Medications:     melatonin 3 mg tablet, Take 1 Tab by mouth nightly., Disp: 30 Tab, Rfl: 3    lisinopril (PRINIVIL, ZESTRIL) 10 mg tablet, TAKE 1 TABLET BY MOUTH DAILY, Disp: 90 Tab, Rfl: 1    fluticasone-vilanterol (BREO ELLIPTA) 200-25 mcg/dose inhaler, Take 1 Puff by inhalation daily. , Disp: 1 Inhaler, Rfl: 5    montelukast (SINGULAIR) 10 mg tablet, TAKE ONE TABLET BY MOUTH EVERY DAY, Disp: 90 Tab, Rfl: 1    olopatadine (PATANOL) 0.1 % ophthalmic solution, Administer 2 Drops to both eyes two (2) times a day., Disp: 5 mL, Rfl: 5    B.infantis-B.ani-B.long-B.bifi (PROBIOTIC 4X) 10-15 mg TbEC, Take  by mouth., Disp: , Rfl:     omeprazole (PRILOSEC) 40 mg capsule, TAKE ONE CAPSULE BY MOUTH EVERY DAY, Disp: 30 Cap, Rfl: 5    PROAIR RESPICLICK 90 mcg/actuation aepb, INHALE ONE PUFF BY MOUTH EVERY 4 HOURS AS NEEDED, Disp: , Rfl: 5    fexofenadine (ALLEGRA) 180 mg tablet, Take  by mouth., Disp: , Rfl:     fluticasone (FLONASE) 50 mcg/actuation nasal spray, take 1 spray(s) intranasally once a day for 30 day(s), Disp: 1 Bottle, Rfl: 11    albuterol (PROVENTIL HFA, VENTOLIN HFA, PROAIR HFA) 90 mcg/actuation inhaler, Take 2 Puffs by inhalation every four (4) hours as needed for Wheezing., Disp: 1 Inhaler, Rfl: 11   Date Last Reviewed:  10/23/2018     Number of Personal Factors/Comorbidities that affect the Plan of Care: 1-2: MODERATE COMPLEXITY   EXAMINATION:   Observation/Orthostatic Postural Assessment:  In WB, medial longitudinal arch is compensated supinated left. In WB, rearfoot appears left calcaneous valgus. Patient exhibits a increased lumbar lordosis and neutral thoracic kyphosis. Single leg stand exhibits challenged with left foot balance, 5 sec holds. Palpation of lower quarter bony landmarks are left iliac crest elevated, level greater trochanters. Palpation:  Soft tissue mobility through left foot/ankle is restricted in plantar fascia, along borders of Achilles tendon. Improving 10/19/18 Muscle length of left greater than right gastrocnemius is restricted in superficial to deep layers. Improving 10/19/18 Muscle length of left soleus is restricted. Improving 10/19/18  ROM:   Passive Accessory Mobility Testing: Talocrural mobility is restricted posterior glide. Subtalar mobility is restricted left. Improving 10/19/18 Midtarsal mobility restricted left. Strength:   Manual Muscle Testing (*/5) Right  Left   Plantarflexion 0-50 0-48   Dorsiflexion 0-10 0-8   Inversion 0-15 0-12   Eversion 0-15 0-12   Functional strength with standing heel raise is 3 inches on the R and 2 inches on the L. Special Tests:  Ligament stress tests including anterior drawer is negative; anterior talofibular is negative; calcaneofibular is negative; posterior talofibular is negative; deltoid ligament is negative; and tibia/fibular compression test is negative. Neurological Screen:  Myotomes: Key muscle strength testing for bilateral LE is intact. Dermatomes: Sensation testing through bilateral LE for light touch is intact. Reflexes: Patellar (L4) and achilles (S1) are 2+ and WFL. Neural tension tests: Slump test is negative   Functional Mobility:  Challenged with weight bearing and ambulation secondary to pain in left foot/leg       Body Structures Involved:  1. Bones  2. Joints  3.  Muscles Body Functions Affected:  1. Sensory/Pain  2. Neuromusculoskeletal  3. Movement Related Activities and Participation Affected:  1. General Tasks and Demands  2. Mobility  3. Domestic Life  4. Community, Social and [place]   Number of elements (examined above) that affect the Plan of Care: 3: MODERATE COMPLEXITY   CLINICAL PRESENTATION:   Presentation: Evolving clinical presentation with changing clinical characteristics: MODERATE COMPLEXITY   CLINICAL DECISION MAKING:   Outcome Measure: Tool Used: PT/OT FOOT AND ANKLE ABILITY MEASURE  Score:  Initial: 54/84 Most Recent: X (Date: -- )   Interpretation of Score: For the \"Activities of Daily Living\", there are 21 questions each scored on a 5 point scale with 0 representing \"Unable to do\" and 4 representing \"No difficulty\". The lower the score, the greater the functional disability. 84/84 represents no disability. Minimal detectable change is 5.7 points. With the addition of the 8 questions in the \"Sports Subscale,\" there are 29 questions, each scored on a 5 point scale with 0 representing \"Unable to do\" and 4 representing \"No difficulty\". The lower the score, the greater the functional disability. 116/116 represents no disability. Minimal detectable change is 12.3 points. Medical Necessity:   · Patient is expected to demonstrate progress in strength, range of motion, balance and coordination to increase independence with ambulation without pain in left foot and perform weight bearing activities without discomfort. .  Reason for Services/Other Comments:  · Patient continues to require skilled intervention due to challenged with ambulation and weight bearing secondary to pain and  discomfort in her left foot/leg. .   Use of outcome tool(s) and clinical judgement create a POC that gives a: Questionable prediction of patient's progress: MODERATE COMPLEXITY            TREATMENT:   (In addition to Assessment/Re-Assessment sessions the following treatments were rendered)  Pre-treatment Symptoms/Complaints:  Patient notes less pain in left foot over the weekend. Wearing more supportive shoes and taping has helped. Pain: Initial:   Pain Intensity 1: 4  Pain Location 1: Foot  Pain Orientation 1: Left  Post Session:  3/10     Therapeutic Exercise: (15 Minutes):  Exercises per grid below to improve mobility, strength, balance and coordination. Required minimal verbal and manual cues to promote proper body alignment, promote proper body posture, promote proper body mechanics and promote proper body breathing techniques. Progressed resistance, range, repetitions and complexity of movement as indicated. Date:  10/23/2018   Activity/Exercise Parameters   Review self mobilization, stretching and foot ROM exercises X 5 minutes   Child pose X 2 minutes   Quadriped arch/sag    Kinesiotape  Applied to left plantar fascia and posterior tibialis. Patient education   X 5 minute review of HEP    Standing functional mobilization of tibia/talus X 2 minutes forward lunge. Manual Therapy (    Soft Tissue Mobilization Duration  Duration: 45 Minutes): Manual techniques to facilitate improved motion and decreased pain. (Used abbreviations: MET - muscle energy technique; PNF - proprioceptive neuromuscular facilitation; NMR - neuromuscular re-education; a/p - anterior to posterior; p/a - posterior to anterior, FMP: functional movement patterns)   · Supine soft tissue mobilization to left plantar fascial and along borders of achilles tendon  · Prone superficial fascia mobilization to posterior left greater than right calf and heel pad with FMP of ankle DF/PF  · Supine subtalar distraction of left LE.         [facility] Portal  Treatment/Session Assessment:    · Response to Treatment:  Decreased restrictions noted in left plantar fascia and heel pad   · Compliance with Program/Exercises: compliant all of the time. · Recommendations/Intent for next treatment session:  \"Next visit will focus on advancements to more challenging activities\".   Total Treatment Duration: 60 minutes  PT Patient Time In/Time Out  Time In: 0490  Time Out: 1827 [address] Charleen Farrell PT

## 2018-10-25 NOTE — PROGRESS NOTES
Suzy Boss  : 1965  Primary: [facility]*  Secondary:  Therapy Center at [place] [id number] [address], [address]  [phone]   [phone]        OUTPATIENT PHYSICAL THERAPY:Daily Note 10/24/2018   ICD-10: Treatment Diagnosis: Plantar fasciitis (fibromatosis) M72.2  ICD-10: Treatment Diagnosis: Achilles tendonitis, left M76.62  ICD-10: Treatment Diagnosis: difficulty walking, not elsewhere classified R26.2  Precautions/Allergies:   Penicillin; Aciphex [rabeprazole]; and Augmentin [amoxicillin-pot clavulanate]   Fall Risk Score: 1 (? 5 = High Risk)  MD Orders: self referred MEDICAL/REFERRING DIAGNOSIS:  Plantar fascial fibromatosis [M72.2]   DATE OF ONSET: 2018  REFERRING PHYSICIAN: Kanika Maynard MD  RETURN PHYSICIAN APPOINTMENT: as needed   RE-CERTIFICATION: : Ms. Sam Angelo attended therapy before her recent trip to [place] to help with her left plantar fasciitis. She presents with continued soft tissue restrictions in her left LE. She is challenged with ambulation and prolonged weight bearing. She would benefit from continued therapy services to improve overall mobility and ability to perform daily functional tasks. INITIAL ASSESSMENT:  Ms. Sam Angelo is a 47 y.o. female who presents to physical therapy with insidious onset of left foot pain and tightness since early August. She is challenged with ambulation, sit to stand transfers and significant discomfort with first steps in the AM. She presents with symptoms of plantar fasciitis and minor achilles tendon tendonitis. She is traveling in the next two weeks which involves a significant amount of walking and would benefit from therapy services to improve overall mobility and function. PROBLEM LIST (Impacting functional limitations):  1. Decreased Strength  2. Decreased ADL/Functional Activities  3. Decreased Transfer Abilities  4.  Decreased Ambulation Ability/Technique  5. Decreased Balance  6. Increased Pain  7. Decreased Activity Tolerance  8. Decreased Flexibility/Joint Mobility INTERVENTIONS PLANNED:  1. Balance Exercise  2. Gait Training  3. Home Exercise Program (HEP)  4. Manual Therapy  5. Neuromuscular Re-education/Strengthening  6. Range of Motion (ROM)  7. Therapeutic Activites  8. Therapeutic Exercise/Strengthening   TREATMENT PLAN:  Effective Dates: 10/19/18 TO 1/17/2019. Frequency/Duration: 2 times a week for 90 Days  GOALS: (Goals have been discussed and agreed upon with patient.)  Discharge Goals: Time Frame: 90 days  1. Patient independent with her HEP without verbal cueing from therapist. GOAL MET  2. Patient able to ambulate for 1 hour with pain no more than 0-2/10  ONGOING  3. Patient able to perform weight bearing for 35 minutes with pain no more than 0-2/10. ONGOING  4. Demonstrates understanding of correct footwear and self taping techniques for her upcoming travel, without verbal cueing from therapist. GOAL MET  5. Patient demonstrates ability to ambulate 3 miles without onset of left foot pain. - ONGOING  Rehabilitation Potential For Stated Goals: Excellent              The information in this section was collected on 8/23/18 (except where otherwise noted). HISTORY:   History of Present Injury/Illness (Reason for Referral): Insidious onset of left foot pain, most discomfort in AM with first steps, sit to stand transfers after sitting prolonged and challenged with gait. If she wears a supportive shoe, less discomfort noted, not able to wear sandals. Patient is traveling to [place] in September and hoping therapy can help with her symptoms. Patient denies dizziness, drop attacks, numbness/tingling, bowel/bladder dysfunction and/or unexplained weight gain/loss.     Past Medical History/Comorbidities:   Ms. Bunny Samayoa  has a past medical history of Allergic rhinitis, cause unspecified, Esophageal reflux, Essential hypertension, benign, Extrinsic asthma, unspecified, Generalized anxiety disorder, Hypothyroidism, Menopause, and Unspecified sleep apnea. Ms. Thanh Amador  has a past surgical history that includes hx cholecystectomy (1/07); hx back surgery; and hx gyn (12/10). Social History/Living Environment:     lives in a private home with her , challenged with stairs in home, walking barefoot. Prior Level of Function/Work/Activity:  Works 3-4x a week, stairs and walks 1/2 mile distance to work office, recently challenged with left foot pain  Dominant Side:         RIGHT  Current Medications:       Current Outpatient Medications:     melatonin 3 mg tablet, Take 1 Tab by mouth nightly., Disp: 30 Tab, Rfl: 3    lisinopril (PRINIVIL, ZESTRIL) 10 mg tablet, TAKE 1 TABLET BY MOUTH DAILY, Disp: 90 Tab, Rfl: 1    fluticasone-vilanterol (BREO ELLIPTA) 200-25 mcg/dose inhaler, Take 1 Puff by inhalation daily. , Disp: 1 Inhaler, Rfl: 5    montelukast (SINGULAIR) 10 mg tablet, TAKE ONE TABLET BY MOUTH EVERY DAY, Disp: 90 Tab, Rfl: 1    olopatadine (PATANOL) 0.1 % ophthalmic solution, Administer 2 Drops to both eyes two (2) times a day., Disp: 5 mL, Rfl: 5    B.infantis-B.ani-B.long-B.bifi (PROBIOTIC 4X) 10-15 mg TbEC, Take  by mouth., Disp: , Rfl:     omeprazole (PRILOSEC) 40 mg capsule, TAKE ONE CAPSULE BY MOUTH EVERY DAY, Disp: 30 Cap, Rfl: 5    PROAIR RESPICLICK 90 mcg/actuation aepb, INHALE ONE PUFF BY MOUTH EVERY 4 HOURS AS NEEDED, Disp: , Rfl: 5    fexofenadine (ALLEGRA) 180 mg tablet, Take  by mouth., Disp: , Rfl:     fluticasone (FLONASE) 50 mcg/actuation nasal spray, take 1 spray(s) intranasally once a day for 30 day(s), Disp: 1 Bottle, Rfl: 11    albuterol (PROVENTIL HFA, VENTOLIN HFA, PROAIR HFA) 90 mcg/actuation inhaler, Take 2 Puffs by inhalation every four (4) hours as needed for Wheezing., Disp: 1 Inhaler, Rfl: 11   Date Last Reviewed:  10/24/2018     Number of Personal Factors/Comorbidities that affect the Plan of Care: 1-2: MODERATE COMPLEXITY   EXAMINATION:   Observation/Orthostatic Postural Assessment:  In WB, medial longitudinal arch is compensated supinated left. In WB, rearfoot appears left calcaneous valgus. Patient exhibits a increased lumbar lordosis and neutral thoracic kyphosis. Single leg stand exhibits challenged with left foot balance, 5 sec holds. Palpation of lower quarter bony landmarks are left iliac crest elevated, level greater trochanters. Palpation:  Soft tissue mobility through left foot/ankle is restricted in plantar fascia, along borders of Achilles tendon. Improving 10/19/18 Muscle length of left greater than right gastrocnemius is restricted in superficial to deep layers. Improving 10/19/18 Muscle length of left soleus is restricted. Improving 10/19/18  ROM:   Passive Accessory Mobility Testing: Talocrural mobility is restricted posterior glide. Subtalar mobility is restricted left. Improving 10/19/18 Midtarsal mobility restricted left. Strength:   Manual Muscle Testing (*/5) Right  Left   Plantarflexion 0-50 0-48   Dorsiflexion 0-10 0-8   Inversion 0-15 0-12   Eversion 0-15 0-12   Functional strength with standing heel raise is 3 inches on the R and 2 inches on the L. Special Tests:  Ligament stress tests including anterior drawer is negative; anterior talofibular is negative; calcaneofibular is negative; posterior talofibular is negative; deltoid ligament is negative; and tibia/fibular compression test is negative. Neurological Screen:  Myotomes: Key muscle strength testing for bilateral LE is intact. Dermatomes: Sensation testing through bilateral LE for light touch is intact. Reflexes: Patellar (L4) and achilles (S1) are 2+ and WFL. Neural tension tests: Slump test is negative   Functional Mobility:  Challenged with weight bearing and ambulation secondary to pain in left foot/leg       Body Structures Involved:  1. Bones  2. Joints  3.  Muscles Body Functions Affected:  1. Sensory/Pain  2. Neuromusculoskeletal  3. Movement Related Activities and Participation Affected:  1. General Tasks and Demands  2. Mobility  3. Domestic Life  4. Community, Social and [place]   Number of elements (examined above) that affect the Plan of Care: 3: MODERATE COMPLEXITY   CLINICAL PRESENTATION:   Presentation: Evolving clinical presentation with changing clinical characteristics: MODERATE COMPLEXITY   CLINICAL DECISION MAKING:   Outcome Measure: Tool Used: PT/OT FOOT AND ANKLE ABILITY MEASURE  Score:  Initial: 54/84 Most Recent: X (Date: -- )   Interpretation of Score: For the \"Activities of Daily Living\", there are 21 questions each scored on a 5 point scale with 0 representing \"Unable to do\" and 4 representing \"No difficulty\". The lower the score, the greater the functional disability. 84/84 represents no disability. Minimal detectable change is 5.7 points. With the addition of the 8 questions in the \"Sports Subscale,\" there are 29 questions, each scored on a 5 point scale with 0 representing \"Unable to do\" and 4 representing \"No difficulty\". The lower the score, the greater the functional disability. 116/116 represents no disability. Minimal detectable change is 12.3 points. Medical Necessity:   · Patient is expected to demonstrate progress in strength, range of motion, balance and coordination to increase independence with ambulation without pain in left foot and perform weight bearing activities without discomfort. .  Reason for Services/Other Comments:  · Patient continues to require skilled intervention due to challenged with ambulation and weight bearing secondary to pain and  discomfort in her left foot/leg. .   Use of outcome tool(s) and clinical judgement create a POC that gives a: Questionable prediction of patient's progress: MODERATE COMPLEXITY            TREATMENT:   (In addition to Assessment/Re-Assessment sessions the following treatments were rendered)  Pre-treatment Symptoms/Complaints:  Patient notes continues to note less discomfort in left foot, tape continues to help. She will be traveling for the next 10 days. Pain: Initial:   Pain Intensity 1: 4  Pain Location 1: Foot  Pain Orientation 1: Left  Post Session:  3/10     Therapeutic Exercise: (15 Minutes):  Exercises per grid below to improve mobility, strength, balance and coordination. Required minimal verbal and manual cues to promote proper body alignment, promote proper body posture, promote proper body mechanics and promote proper body breathing techniques. Progressed resistance, range, repetitions and complexity of movement as indicated. Date:  10/24/2018   Activity/Exercise Parameters   Review self mobilization, stretching and foot ROM exercises X 5 minutes   Child pose X 2 minutes   Quadriped arch/sag    Kinesiotape  Applied to left plantar fascia and posterior tibialis. Patient education   X 5 minute review of HEP    Standing functional mobilization of tibia/talus X 2 minutes forward lunge. Manual Therapy (    Soft Tissue Mobilization Duration  Duration: 45 Minutes): Manual techniques to facilitate improved motion and decreased pain. (Used abbreviations: MET - muscle energy technique; PNF - proprioceptive neuromuscular facilitation; NMR - neuromuscular re-education; a/p - anterior to posterior; p/a - posterior to anterior, FMP: functional movement patterns)   · Supine soft tissue mobilization to left plantar fascial and along borders of achilles tendon  · Prone superficial fascia mobilization to posterior left greater than right calf and heel pad with FMP of ankle DF/PF  · Supine subtalar distraction of left LE.         [facility] Portal  Treatment/Session Assessment:    · Response to Treatment:  Improving overall mobility in left calf and foot. Decreased restrictions noted in plantar fascia and heel pad. Will follow up with patient when she returns from out of town. · Compliance with Program/Exercises: compliant all of the time. · Recommendations/Intent for next treatment session: \"Next visit will focus on advancements to more challenging activities\".   Total Treatment Duration: 60 minutes  PT Patient Time In/Time Out  Time In: 3890  Time Out: 6460 Maria Luz Renteria PT

## 2018-11-13 ENCOUNTER — APPOINTMENT (OUTPATIENT)
Dept: PHYSICAL THERAPY | Age: 53
End: 2018-11-13
Payer: COMMERCIAL

## 2018-12-18 ENCOUNTER — APPOINTMENT (OUTPATIENT)
Dept: PHYSICAL THERAPY | Age: 53
End: 2018-12-18

## 2018-12-27 ENCOUNTER — HOSPITAL ENCOUNTER (OUTPATIENT)
Dept: PHYSICAL THERAPY | Age: 53
Discharge: HOME | End: 2018-12-27

## 2018-12-27 NOTE — PROGRESS NOTES
Anayeli Chi   [phone]) [id number]  at  [address], [address] [place] [address]  [phone]  [phone]             DATE: 2018    Patient canceled for appointment today due to not feeling well. Will plan to follow up on next scheduled visit.     Josiah Askew PT, DPT, CFMT

## 2019-04-30 ENCOUNTER — HOSPITAL ENCOUNTER (OUTPATIENT)
Dept: MAMMOGRAPHY | Age: 54
Discharge: HOME | End: 2019-04-30
Attending: FAMILY MEDICINE

## 2019-04-30 DIAGNOSIS — Z12.39 SCREENING FOR BREAST CANCER: ICD-10-CM

## 2019-12-23 ENCOUNTER — HOSPITAL ENCOUNTER (OUTPATIENT)
Dept: LAB | Age: 54
Discharge: HOME | End: 2019-12-23

## 2019-12-23 PROCEDURE — 88305 TISSUE EXAM BY PATHOLOGIST: CPT

## 2020-01-06 ENCOUNTER — APPOINTMENT (RX ONLY)
Dept: URBAN - METROPOLITAN AREA CLINIC 349 | Facility: CLINIC | Age: 55
Setting detail: DERMATOLOGY
End: 2020-01-06

## 2020-01-06 DIAGNOSIS — Z87.2 PERSONAL HISTORY OF DISEASES OF THE SKIN AND SUBCUTANEOUS TISSUE: ICD-10-CM

## 2020-01-06 DIAGNOSIS — D22 MELANOCYTIC NEVI: ICD-10-CM | Status: STABLE

## 2020-01-06 DIAGNOSIS — L82.1 OTHER SEBORRHEIC KERATOSIS: ICD-10-CM

## 2020-01-06 DIAGNOSIS — L85.3 XEROSIS CUTIS: ICD-10-CM

## 2020-01-06 PROBLEM — K21.9 GASTRO-ESOPHAGEAL REFLUX DISEASE WITHOUT ESOPHAGITIS: Status: ACTIVE | Noted: 2020-01-06

## 2020-01-06 PROBLEM — D22.5 MELANOCYTIC NEVI OF TRUNK: Status: ACTIVE | Noted: 2020-01-06

## 2020-01-06 PROCEDURE — ? BODY PHOTOGRAPHY

## 2020-01-06 PROCEDURE — ? COUNSELING

## 2020-01-06 PROCEDURE — 99213 OFFICE O/P EST LOW 20 MIN: CPT

## 2020-01-06 PROCEDURE — ? MEDICAL PHOTOGRAPHY REVIEW

## 2020-01-06 ASSESSMENT — LOCATION DETAILED DESCRIPTION DERM
LOCATION DETAILED: LEFT PROXIMAL RADIAL DORSAL FOREARM
LOCATION DETAILED: LEFT ANTERIOR PROXIMAL THIGH
LOCATION DETAILED: RIGHT INFERIOR MEDIAL MIDBACK
LOCATION DETAILED: RIGHT LATERAL ABDOMEN
LOCATION DETAILED: LEFT SUPERIOR MEDIAL MIDBACK
LOCATION DETAILED: RIGHT PROXIMAL DORSAL FOREARM
LOCATION DETAILED: RIGHT ANTERIOR PROXIMAL THIGH
LOCATION DETAILED: LEFT PROXIMAL DORSAL FOREARM
LOCATION DETAILED: RIGHT INFERIOR MEDIAL UPPER BACK

## 2020-01-06 ASSESSMENT — LOCATION ZONE DERM
LOCATION ZONE: LEG
LOCATION ZONE: ARM
LOCATION ZONE: TRUNK

## 2020-01-06 ASSESSMENT — LOCATION SIMPLE DESCRIPTION DERM
LOCATION SIMPLE: RIGHT UPPER BACK
LOCATION SIMPLE: RIGHT LOWER BACK
LOCATION SIMPLE: RIGHT FOREARM
LOCATION SIMPLE: ABDOMEN
LOCATION SIMPLE: LEFT LOWER BACK
LOCATION SIMPLE: LEFT FOREARM
LOCATION SIMPLE: RIGHT THIGH
LOCATION SIMPLE: LEFT THIGH

## 2020-01-06 NOTE — PROCEDURE: MEDICAL PHOTOGRAPHY REVIEW
Number Of Photographs Reviewed: 7
Review Findings: no new or changing lesions
Detail Level: Detailed

## 2020-06-12 ENCOUNTER — HOSPITAL ENCOUNTER (OUTPATIENT)
Dept: MAMMOGRAPHY | Age: 55
Discharge: HOME | End: 2020-06-12
Attending: FAMILY MEDICINE

## 2020-06-12 DIAGNOSIS — Z12.31 SCREENING MAMMOGRAM, ENCOUNTER FOR: ICD-10-CM

## 2021-01-06 ENCOUNTER — APPOINTMENT (RX ONLY)
Dept: URBAN - METROPOLITAN AREA CLINIC 349 | Facility: CLINIC | Age: 56
Setting detail: DERMATOLOGY
End: 2021-01-06

## 2021-01-06 DIAGNOSIS — L81.4 OTHER MELANIN HYPERPIGMENTATION: ICD-10-CM

## 2021-01-06 DIAGNOSIS — L91.8 OTHER HYPERTROPHIC DISORDERS OF THE SKIN: ICD-10-CM

## 2021-01-06 DIAGNOSIS — L57.8 OTHER SKIN CHANGES DUE TO CHRONIC EXPOSURE TO NONIONIZING RADIATION: ICD-10-CM

## 2021-01-06 DIAGNOSIS — L29.89 OTHER PRURITUS: ICD-10-CM

## 2021-01-06 DIAGNOSIS — Z87.2 PERSONAL HISTORY OF DISEASES OF THE SKIN AND SUBCUTANEOUS TISSUE: ICD-10-CM

## 2021-01-06 DIAGNOSIS — L85.3 XEROSIS CUTIS: ICD-10-CM

## 2021-01-06 DIAGNOSIS — L20.89 OTHER ATOPIC DERMATITIS: ICD-10-CM

## 2021-01-06 DIAGNOSIS — L82.0 INFLAMED SEBORRHEIC KERATOSIS: ICD-10-CM

## 2021-01-06 DIAGNOSIS — L29.8 OTHER PRURITUS: ICD-10-CM

## 2021-01-06 DIAGNOSIS — D22 MELANOCYTIC NEVI: ICD-10-CM | Status: STABLE

## 2021-01-06 PROBLEM — L20.84 INTRINSIC (ALLERGIC) ECZEMA: Status: ACTIVE | Noted: 2021-01-06

## 2021-01-06 PROBLEM — I10 ESSENTIAL (PRIMARY) HYPERTENSION: Status: ACTIVE | Noted: 2021-01-06

## 2021-01-06 PROBLEM — D22.5 MELANOCYTIC NEVI OF TRUNK: Status: ACTIVE | Noted: 2021-01-06

## 2021-01-06 PROCEDURE — ? LIQUID NITROGEN

## 2021-01-06 PROCEDURE — ? SKIN TAG REMOVAL (COSMETIC)

## 2021-01-06 PROCEDURE — ? LIQUID NITROGEN (COSMETIC)

## 2021-01-06 PROCEDURE — ? MEDICAL PHOTOGRAPHY REVIEW

## 2021-01-06 PROCEDURE — ? TREATMENT REGIMEN

## 2021-01-06 PROCEDURE — 17110 DESTRUCTION B9 LES UP TO 14: CPT

## 2021-01-06 PROCEDURE — 99214 OFFICE O/P EST MOD 30 MIN: CPT | Mod: 25

## 2021-01-06 PROCEDURE — ? COUNSELING

## 2021-01-06 PROCEDURE — ? PRESCRIPTION

## 2021-01-06 RX ORDER — CLOBETASOL PROPIONATE 0.5 MG/G
CREAM TOPICAL
Qty: 1 | Refills: 2 | Status: ERX | COMMUNITY
Start: 2021-01-06

## 2021-01-06 RX ADMIN — CLOBETASOL PROPIONATE: 0.5 CREAM TOPICAL at 00:00

## 2021-01-06 ASSESSMENT — LOCATION DETAILED DESCRIPTION DERM
LOCATION DETAILED: LEFT ANTERIOR PROXIMAL THIGH
LOCATION DETAILED: RIGHT INFERIOR MEDIAL UPPER BACK
LOCATION DETAILED: RIGHT DISTAL PRETIBIAL REGION
LOCATION DETAILED: RIGHT DORSAL THUMB METACARPOPHALANGEAL JOINT
LOCATION DETAILED: RIGHT LATERAL ABDOMEN
LOCATION DETAILED: RIGHT ANTERIOR PROXIMAL THIGH
LOCATION DETAILED: LEFT POSTERIOR AXILLA
LOCATION DETAILED: LEFT DISTAL PRETIBIAL REGION
LOCATION DETAILED: RIGHT DISTAL RADIAL DORSAL FOREARM
LOCATION DETAILED: RIGHT RADIAL DORSAL HAND
LOCATION DETAILED: LEFT ANTERIOR AXILLA
LOCATION DETAILED: RIGHT INFERIOR MEDIAL MIDBACK

## 2021-01-06 ASSESSMENT — LOCATION ZONE DERM
LOCATION ZONE: AXILLAE
LOCATION ZONE: FINGER
LOCATION ZONE: ARM
LOCATION ZONE: TRUNK
LOCATION ZONE: HAND
LOCATION ZONE: LEG

## 2021-01-06 ASSESSMENT — LOCATION SIMPLE DESCRIPTION DERM
LOCATION SIMPLE: LEFT PRETIBIAL REGION
LOCATION SIMPLE: LEFT THIGH
LOCATION SIMPLE: LEFT ANTERIOR AXILLA
LOCATION SIMPLE: RIGHT FOREARM
LOCATION SIMPLE: LEFT POSTERIOR AXILLA
LOCATION SIMPLE: ABDOMEN
LOCATION SIMPLE: RIGHT UPPER BACK
LOCATION SIMPLE: RIGHT THIGH
LOCATION SIMPLE: RIGHT HAND
LOCATION SIMPLE: RIGHT PRETIBIAL REGION
LOCATION SIMPLE: RIGHT THUMB
LOCATION SIMPLE: RIGHT LOWER BACK

## 2021-01-06 ASSESSMENT — SEVERITY ASSESSMENT 2020: SEVERITY 2020: MILD

## 2021-01-06 NOTE — PROCEDURE: LIQUID NITROGEN
Medical Necessity Clause: This procedure was medically necessary because the lesions that were treated were:
Render Note In Bullet Format When Appropriate: No
Medical Necessity Information: It is in your best interest to select a reason for this procedure from the list below. All of these items fulfill various CMS LCD requirements except the new and changing color options.
Detail Level: Detailed
Render Post-Care Instructions In Note?: yes
Post-Care Instructions: I reviewed with the patient in detail post-care instructions. Patient is to wear sunprotection, and avoid picking at any of the treated lesions. Pt may apply Vaseline to crusted or scabbing areas.
Number Of Freeze-Thaw Cycles: 2 freeze-thaw cycles
Duration Of Freeze Thaw-Cycle (Seconds): 5-10
Consent: The patient's consent was obtained including but not limited to risks of crusting, scabbing, blistering, scarring, darker or lighter pigmentary change, recurrence, incomplete removal and infection.

## 2021-01-06 NOTE — HPI: RASH
How Severe Is Your Rash?: mild
Is This A New Presentation, Or A Follow-Up?: Rash
Rajiv Moore)  Obstetrics and Gynecology  [address] [place] [address]  Phone: [phone]  Fax: [phone]  Follow Up Time: 2 weeks

## 2021-01-06 NOTE — PROCEDURE: LIQUID NITROGEN (COSMETIC)
Render Post-Care Instructions In Note?: no
Billing Information: Bill by Static Price
Post-Care Instructions: I reviewed with the patient in detail post-care instructions. Patient is to wear sunprotection, and avoid picking at any of the treated lesions. Pt may apply Vaseline to crusted or scabbing areas.
Price (Use Numbers Only, No Special Characters Or $): 50
Consent: The patient's consent was obtained including but not limited to risks of crusting, scabbing, blistering, scarring, darker or lighter pigmentary change, recurrence, incomplete removal and infection. The patient understands that the procedure is cosmetic in nature and is not covered by insurance.
Detail Level: Detailed

## 2021-01-06 NOTE — PROCEDURE: SKIN TAG REMOVAL (COSMETIC)
Consent: Written consent obtained and the risks of skin tag removal was reviewed with the patient including but not limited to bleeding, pigmentary change, infection, pain, and remote possibility of scarring.
Detail Level: Detailed
Price (Use Numbers Only, No Special Characters Or $): 30
Removed With: gradle excision
Anesthesia Volume In Cc: 0

## 2021-01-06 NOTE — PROCEDURE: TREATMENT REGIMEN
Samples Given: Cerave cream apply twice daily as needed, Eucerin advanced repair cream, apply daily as needed.
Detail Level: Detailed

## 2021-03-25 ENCOUNTER — TRANSCRIBE ORDER (OUTPATIENT)
Dept: SCHEDULING | Age: 56
End: 2021-03-25

## 2021-03-25 DIAGNOSIS — Z12.31 ENCOUNTER FOR SCREENING MAMMOGRAM FOR MALIGNANT NEOPLASM OF BREAST: Primary | ICD-10-CM

## 2021-08-13 ENCOUNTER — HOSPITAL ENCOUNTER (OUTPATIENT)
Dept: MAMMOGRAPHY | Age: 56
Discharge: HOME | End: 2021-08-13
Attending: FAMILY MEDICINE

## 2021-08-13 DIAGNOSIS — Z12.31 ENCOUNTER FOR SCREENING MAMMOGRAM FOR MALIGNANT NEOPLASM OF BREAST: ICD-10-CM

## 2021-09-12 NOTE — PROCEDURE: BODY PHOTOGRAPHY
Detail Level: Generalized
The patient is a 7y10m Male complaining of pain, wrist.
Reason For Photography: The patient is obtaining body photography to observe existing suspicious moles and or monitor for the appearance of any new lesions.
Number Of Photographs (Optional- Will Not Render If 0): 2
Consent: Written consent obtained, risks reviewed for whole body photography. Patient understands that photograph costs may not be covered by insurance, and patient is ultimately responsible for payment.
Was The Entire Body Photographed (Cannot Bill Unless Entire Body Photographed)?: No
Whole Body Statement: The whole body was photographed today.

## 2021-11-29 ENCOUNTER — APPOINTMENT (OUTPATIENT)
Dept: PHYSICAL THERAPY | Age: 56
End: 2021-11-29

## 2021-12-01 ENCOUNTER — HOSPITAL ENCOUNTER (OUTPATIENT)
Dept: PHYSICAL THERAPY | Age: 56
Discharge: HOME | End: 2021-12-01
Payer: COMMERCIAL

## 2021-12-01 PROCEDURE — 97162 PT EVAL MOD COMPLEX 30 MIN: CPT

## 2021-12-02 NOTE — THERAPY EVALUATION
Agatha Ham Feeling  : 1965  Primary: [facility]*  Secondary:  [facility] at [address]  [phone]   [phone]        OUTPATIENT PHYSICAL THERAPY:Initial Assessment 2021   ICD-10: Treatment Diagnosis: Cervicalgia (M54.2)   ICD-10: Treatment Diagnosis: Pain in thoracic spine (M54.6)    Precautions/Allergies:   Penicillin, Aciphex [rabeprazole], and Augmentin [amoxicillin-pot clavulanate]   TREATMENT PLAN:  Effective Dates: 2021 TO 3/1/2022 (90 days). Frequency/Duration: 2 times a week for 90 Day(s) MEDICAL/REFERRING DIAGNOSIS:  Numbness of left hand [R20.0]  Numbness of right hand [R20.0]   DATE OF ONSET: chronic, 1 year, progressing  REFERRING PHYSICIAN: Dr. Gaby Mcclure MD MD Orders: evaluate and treat  Return MD Appointment: as needed     INITIAL ASSESSMENT:  Ms. Shay Werner is a 55 y.o. female who presents to physical therapy with insidious onset of bilateral hand numbness every night when she sleeps. She presents with cervical and thoracic spine dysfunctions, bilateral UE neural tension and significant postural deficits. She is challenged with her sleep positions and her sleep quality. She would benefit from skilled physical therapy to improve mobility of her cervical and thoracic spine, improve postural awareness, improve awareness of sleeping positions and habits and improve postural stability and core stability strength. PROBLEM LIST (Impacting functional limitations):  1. Decreased Strength  2. Decreased ADL/Functional Activities  3. Decreased Balance  4. Increased Pain  5. Decreased Activity Tolerance  6. Decreased Flexibility/Joint Mobility INTERVENTIONS PLANNED: (Treatment may consist of any combination of the following)  1. Home Exercise Program (HEP)  2. Manual Therapy  3. Neuromuscular Re-education/Strengthening  4. Range of Motion (ROM)  5. Therapeutic Activites  6.  Therapeutic Exercise/Strengthening     GOALS: (Goals have been discussed and agreed upon with patient.)    Discharge Goals: Time Frame: 90 days  1. Patient demonstrated independence with her HEP without verbal cueing from therapist.   2. Patient able to sleep for 6 consecutive hours with correct sleeping positions and pillow supports. 3. Patient able to stand for 30 minutes to perform household activities with correct posture and automatic core engagement. 4. Improve DASH outcome measure score from 17/55 to 13/55 to perform ADLs. OUTCOME MEASURE:   Tool Used: Disabilities of the Arm, Shoulder and Hand (DASH) Questionnaire - Quick Version  Score:  Initial: 17/55  Most Recent: X/55 (Date: -- )   Interpretation of Score: The DASH is designed to measure the activities of daily living in person's with upper extremity dysfunction or pain. Each section is scored on a 1-5 scale, 5 representing the greatest disability. The scores of each section are added together for a total score of 55. MEDICAL NECESSITY:   · Patient is expected to demonstrate progress in strength, range of motion, balance, coordination and functional technique to increase independence with sleeping quality and positions, prolonged standing and sitting activities without onset of bilateral UE numbness. REASON FOR SERVICES/OTHER COMMENTS:  · Patient continues to require skilled intervention due to challenged with sleep quality and bilateral numbness in UE. James Huerta Total Duration:  PT Patient Time In/Time Out  Time In: 1630  Time Out: 1730    Rehabilitation Potential For Stated Goals: Excellent  Regarding Sarah Daly's therapy, I certify that the treatment plan above will be carried out by a therapist or under their direction.   Thank you for this referral,  Gillian Kern, PT     Referring Physician Signature: Dr. Joanna Syed MD _______________________________ Date _____________      PAIN/SUBJECTIVE:   Initial: Pain Intensity 1: 3  Pain Location 1: Hand, Spine, cervical  Pain Orientation 1: Left, Right  Post Session:  3/10   HISTORY:   History of Injury/Illness (Reason for Referral): Insidious onset of bilateral numbness in hands and forearms with sleeping. Symptoms occur every night. She is challenged with sleep positions and constantly toss and turn between side sleeping and supine. Patient notes she does not sleep prone. She notes she is constantly exhausted. She notes numbness intermittently occurs with sitting and desk work. She notes she just got a new pillow and just ordered a new sleep number bed. Chronic low back pain, coccyx pain and fatigue. Past Medical History/Comorbidities:    Ms. Johnny Piper  has a past medical history of Allergic rhinitis, cause unspecified, Esophageal reflux, Essential hypertension, benign, Extrinsic asthma, unspecified, Generalized anxiety disorder, Hypothyroidism, Menopause, and Unspecified sleep apnea. Ms. Johnny Piper  has a past surgical history that includes hx cholecystectomy (1/07); hx back surgery; and hx gyn (12/10). Social History/Living Environment:     lives in a private home with her , challenged with sleeping environments and habits. Prior Level of Function/Work/Activity:  Independent, limited with ADLs and recreational activities secondary to postural deficits  Dominant Side:         RIGHT   Ambulatory/Rehab Services H2 Model Falls Risk Assessment   Risk Factors:       No Risk Factors Identified Ability to Rise from Chair:       (0)  Ability to rise in a single movement   Falls Prevention Plan:       No modifications necessary   Total: (5 or greater = High Risk): 0   ©2010 [facility] of [employer]. All Rights Reserved. [facility] Patent #[id number].  Federal Law prohibits the replication, distribution or use without written permission from [facility] [employer]   Current Medications:       Current Outpatient Medications:     Nebulizer & Compressor machine, And accessories, Disp: 1 Each, Rfl: 0    predniSONE (DELTASONE) 10 mg tablet, Sterapred DS pack: taper 60mg to 20mg over 12 days. Dispense 1 pack. , Disp: 1 Tab, Rfl: 0    albuterol (PROVENTIL VENTOLIN) 2.5 mg /3 mL (0.083 %) nebulizer solution, 3 mL by Nebulization route every four (4) hours as needed for Wheezing., Disp: 24 Each, Rfl: 1    melatonin 3 mg tablet, Take 1 Tab by mouth nightly., Disp: 30 Tab, Rfl: 3    lisinopril (PRINIVIL, ZESTRIL) 10 mg tablet, TAKE 1 TABLET BY MOUTH DAILY, Disp: 90 Tab, Rfl: 1    fluticasone-vilanterol (BREO ELLIPTA) 200-25 mcg/dose inhaler, Take 1 Puff by inhalation daily. , Disp: 1 Inhaler, Rfl: 5    montelukast (SINGULAIR) 10 mg tablet, TAKE ONE TABLET BY MOUTH EVERY DAY, Disp: 90 Tab, Rfl: 1    olopatadine (PATANOL) 0.1 % ophthalmic solution, Administer 2 Drops to both eyes two (2) times a day., Disp: 5 mL, Rfl: 5    B.infantis-B.ani-B.long-B.bifi (PROBIOTIC 4X) 10-15 mg TbEC, Take  by mouth., Disp: , Rfl:     omeprazole (PRILOSEC) 40 mg capsule, TAKE ONE CAPSULE BY MOUTH EVERY DAY, Disp: 30 Cap, Rfl: 5    PROAIR RESPICLICK 90 mcg/actuation aepb, INHALE ONE PUFF BY MOUTH EVERY 4 HOURS AS NEEDED, Disp: , Rfl: 5    fexofenadine (ALLEGRA) 180 mg tablet, Take  by mouth., Disp: , Rfl:     fluticasone (FLONASE) 50 mcg/actuation nasal spray, take 1 spray(s) intranasally once a day for 30 day(s), Disp: 1 Bottle, Rfl: 11    albuterol (PROVENTIL HFA, VENTOLIN HFA, PROAIR HFA) 90 mcg/actuation inhaler, Take 2 Puffs by inhalation every four (4) hours as needed for Wheezing., Disp: 1 Inhaler, Rfl: 11   Date Last Reviewed:  12/1/2021   Number of Personal Factors/Comorbidities that affect the Plan of Care: 1-2: MODERATE COMPLEXITY   EXAMINATION:   Observation/Orthostatic Postural Assessment:          Patient denies any increase of symptoms with coughing, sneezing or valsalva maneuver.  Patient denies any headaches, changes in vision, dizziness, vertigo, nausea, drop attacks, black outs, tinnitus, dysphagia, dysarthria, LE symptoms or bowel/bladder dysfunction. Patient notes bilateral forearm and hand intermittent numbness. Patient exhibits a decreased cervical lordosis and slight increased thoracic kyphosis. She presents with posterior torso over anterior pelvis with elevated sternum. Patient exhibits a anterior shear through lumbar spine. Shoulder symmetry exhibits bilateral protraction. Shoulder girdles are right elevated. Scapular position is right elevated. Position of head is right cranial head tilt. Vertical compression test (VCT) for alignment is 2. Elbow flexion test (EFT) for motor activation is 2. Soft tissue observations indicates restrictions in anterior chest, anterior cervical spine, SCM, periscapular musculature, upper trapezius, levator scapula, pectoralis major/minor. Palpation:  Myofascial assessment indicates restrictions in anterior chest. Muscle length testing levator scapulae with ipsilateral arm abduction is restricted bilaterally. Upper trapezius length is restricted left greater than right. Pectoralis minor/major and latissimus dorsi exhibit restricted bilaterally. Breathing assessment indicates decreased inhalation, left greater than right. Ist rib palpation exhibits decreased inferior glide left. Scalene muscle length is restricted bilaterally. ROM: Gross active cervical spine rotation is 90% available on the R and 90% available of the L. Active cervical spine flexion is 5% restricted. Active cervical spine extension is 5% restricted. R side bending is 3 finger breaths ear to shoulder. L side bending is 3 finger breaths ear to shoulder. Mid/lower cervical spine PROM is mild restrictions right. Passive intervertebral motion testing through cervical spine indicates mild restrictions in right side bending. Passive intervertebral motion testing through thoracic spine indicates restrictions through T1-5 with bilateral limited extension.  Thoracic positional testing at transverse processes indicates restrictions in bilateral extension. Strength: Functional strength testing through cervical spine 4-/5. Postural stability strength 4-/5. Core stability 4-/5  Special Tests:   ·  Sharp-Scarlet test is not tested  ·  cranial atlas lift is negative  ·  [place]-Axis shear test is negative  ·  [place]-Axis side flexion test for integrity of alar ligament is negative  ·  Tectorial membrane test is negative. ·  Spurling test is negative. ·  Cervical distraction is negative. ·  Cervical compression is negative. · Hautant's test is negative. (Vertebral-Basilar Screen)  ·  Cranial extension test is negative. (Vertebral-Basilar Screen)  Neurological Screen:  Myotomes: Key muscle strength testing for bilateral UE is intact. Dermatomes: Sensation testing through bilateral upper quadrants for light touch is intact. Reflexes: Biceps (C5), brachioradialis (C6), and triceps (C7) are 2+ and WFL. Neural tension tests: Upper limb tension test positive bilaterally. Slump test is moderate restrictions. Functional Mobility:  Challenged with sleep positions and habits, challenged with sitting and standing postures. Body Structures Involved:  1. Nerves  2. Thoracic Cage  3. Joints  4. Muscles Body Functions Affected:  1. Sensory/Pain  2. Neuromusculoskeletal  3. Movement Related Activities and Participation Affected:  1. General Tasks and Demands  2. Mobility  3.  Community, Social and [place]   Number of elements (examined above) that affect the Plan of Care: 3: MODERATE COMPLEXITY   CLINICAL PRESENTATION:   Presentation: Evolving clinical presentation with changing clinical characteristics: MODERATE COMPLEXITY   CLINICAL DECISION MAKING:   Use of outcome tool(s) and clinical judgement create a POC that gives a: Questionable prediction of patient's progress: MODERATE COMPLEXITY

## 2021-12-06 ENCOUNTER — HOSPITAL ENCOUNTER (OUTPATIENT)
Dept: PHYSICAL THERAPY | Age: 56
Discharge: HOME | End: 2021-12-06
Payer: COMMERCIAL

## 2021-12-06 PROCEDURE — 97110 THERAPEUTIC EXERCISES: CPT

## 2021-12-06 PROCEDURE — 97140 MANUAL THERAPY 1/> REGIONS: CPT

## 2021-12-08 ENCOUNTER — HOSPITAL ENCOUNTER (OUTPATIENT)
Dept: PHYSICAL THERAPY | Age: 56
Discharge: HOME | End: 2021-12-08
Payer: COMMERCIAL

## 2021-12-08 PROCEDURE — 97110 THERAPEUTIC EXERCISES: CPT

## 2021-12-08 PROCEDURE — 97140 MANUAL THERAPY 1/> REGIONS: CPT

## 2021-12-09 NOTE — PROGRESS NOTES
Brianda Yao  : 1965  Primary: [facility]*  Secondary:  Therapy Center at [address]  [phone]   [phone]      OUTPATIENT PHYSICAL THERAPY: Daily Treatment Note 2021  Visit Count:  3    ICD-10: Treatment Diagnosis: Cervicalgia (M54.2)   ICD-10: Treatment Diagnosis: Pain in thoracic spine (M54.6)     Precautions/Allergies:   Penicillin, Aciphex [rabeprazole], and Augmentin [amoxicillin-pot clavulanate]   TREATMENT PLAN:  Effective Dates: 2021 TO 3/1/2022 (90 days). Frequency/Duration: 2 times a week for 90 Day(s) MEDICAL/REFERRING DIAGNOSIS:  Numbness of left hand [R20.0]  Numbness of right hand [R20.0]   DATE OF ONSET: chronic, 1 year, progressing  REFERRING PHYSICIAN: Dr. Denise Hubbard MD MD Orders: evaluate and treat  Return MD Appointment: as needed     Pre-treatment Symptoms/Complaints:  Patient brought her pillow with her, notes she is sleeping a little better  Pain: Initial: Pain Intensity 1: 3  Pain Location 1: Hand, Spine, cervical  Pain Orientation 1: Left, Right /10 Post Session:  2/10   Medications Last Reviewed:  2021  Updated Objective Findings:  None Today  TREATMENT:     THERAPEUTIC EXERCISE: (15 minutes):  Exercises per grid below to improve mobility, strength, balance and coordination. Required moderate verbal, manual and tactile cues to promote proper body alignment, promote proper body posture, promote proper body mechanics and promote proper body breathing techniques. Progressed resistance, range, repetitions and complexity of movement as indicated.    Date:  2021     Activity/Exercise Parameters   Sleeping and Pillow review X 10 minutes   Quadriped arch/sag X 5 reps, 10 sec holds   Thread the needle X 5 reps, 10 sec holds                       MANUAL THERAPY: (40 minutes): Joint mobilization and Soft tissue mobilization was utilized and necessary because of the patient's restricted joint motion, painful spasm, loss of articular motion and restricted motion of soft tissue. (Used abbreviations: MET - muscle energy technique; PNF - proprioceptive neuromuscular facilitation; NMR - neuromuscular re-education; a/p - anterior to posterior; p/a - posterior to anterior, FMP - functional movement patterns, SF - Superficial Fascia; BC - Bony contours; MP - muscle play; IASTM - instrument-assisted soft tissue mobilization)  · Supine soft tissue mobilization of anterior chest with breathing techniques  · Supine bilateral UE neural glides with FMP of wrist flexion/extension. · Supine cervical spine manual traction and suboccipital release  · Prone soft tissue mobilization of thoracic spine paraspinals and superficial fascia    [employer] Portal  Treatment/Session Summary:    · Response to Treatment:  improvements noted in neural glides and decreased soft tissue restrictions in anterior chest.  · Communication/Consultation:  None today  · Equipment provided today:  None today  · Recommendations/Intent for next treatment session: Next visit will focus on soft tissue mobilization to anterior chest/cervical spine and thoracic spine, postural awareness, breathing techniques.     Total Treatment Billable Duration:  55 minutes  PT Patient Time In/Time Out  Time In: 1630  Time Out: MARYAM Rao    Future Appointments   Date Time Provider Jackie Mckinney   12/14/2021  9:30 AM [facility]   12/15/2021  9:30 AM [facility]   12/20/2021  4:30 PM Rajiv Durham, PT [facility]   12/22/2021  9:30 AM [facility] Durham[facility]

## 2021-12-14 ENCOUNTER — HOSPITAL ENCOUNTER (OUTPATIENT)
Dept: PHYSICAL THERAPY | Age: 56
Discharge: HOME | End: 2021-12-14
Payer: COMMERCIAL

## 2021-12-14 PROCEDURE — 97110 THERAPEUTIC EXERCISES: CPT

## 2021-12-14 PROCEDURE — 97140 MANUAL THERAPY 1/> REGIONS: CPT

## 2021-12-15 ENCOUNTER — HOSPITAL ENCOUNTER (OUTPATIENT)
Dept: PHYSICAL THERAPY | Age: 56
Discharge: HOME | End: 2021-12-15
Payer: COMMERCIAL

## 2021-12-15 PROCEDURE — 97110 THERAPEUTIC EXERCISES: CPT

## 2021-12-15 PROCEDURE — 97140 MANUAL THERAPY 1/> REGIONS: CPT

## 2021-12-15 NOTE — PROGRESS NOTES
Abelino Sheehanalbarorafaela  : 1965  Primary: [place] Andrea [facility]*  Secondary:  [facility] at [place] [id number] [address], [address]  [phone]   [phone]      OUTPATIENT PHYSICAL THERAPY: Daily Treatment Note 2021  Visit Count:  4    ICD-10: Treatment Diagnosis: Cervicalgia (M54.2)   ICD-10: Treatment Diagnosis: Pain in thoracic spine (M54.6)     Precautions/Allergies:   Penicillin, Aciphex [rabeprazole], and Augmentin [amoxicillin-pot clavulanate]   TREATMENT PLAN:  Effective Dates: 2021 TO 3/1/2022 (90 days). Frequency/Duration: 2 times a week for 90 Day(s) MEDICAL/REFERRING DIAGNOSIS:  Numbness of left hand [R20.0]  Numbness of right hand [R20.0]   DATE OF ONSET: chronic, 1 year, progressing  REFERRING PHYSICIAN: Dr. Makayla Kendrick MD MD Orders: evaluate and treat  Return MD Appointment: as needed     Pre-treatment Symptoms/Complaints:  Patient brought her pillow with her, notes she is sleeping a little better with the new pillow and notes less discomfort in her hands at night. Pain: Initial: Pain Intensity 1: 2  Pain Location 1: Spine, cervical  Pain Orientation 1: Left, Right /10 Post Session:  2/10   Medications Last Reviewed:  2021  Updated Objective Findings:  None Today  TREATMENT:     THERAPEUTIC EXERCISE: (15 minutes):  Exercises per grid below to improve mobility, strength, balance and coordination. Required moderate verbal, manual and tactile cues to promote proper body alignment, promote proper body posture, promote proper body mechanics and promote proper body breathing techniques. Progressed resistance, range, repetitions and complexity of movement as indicated.    Date:  2021     Activity/Exercise Parameters   Sleeping and Pillow review X 10 minutes   Quadriped arch/sag X 5 reps, 10 sec holds   Thread the needle X 5 reps, 10 sec holds                       MANUAL THERAPY: (40 minutes): Joint mobilization and Soft tissue mobilization was utilized and necessary because of the patient's restricted joint motion, painful spasm, loss of articular motion and restricted motion of soft tissue. (Used abbreviations: MET - muscle energy technique; PNF - proprioceptive neuromuscular facilitation; NMR - neuromuscular re-education; a/p - anterior to posterior; p/a - posterior to anterior, FMP - functional movement patterns, SF - Superficial Fascia; BC - Bony contours; MP - muscle play; IASTM - instrument-assisted soft tissue mobilization)  · Supine soft tissue mobilization of anterior chest with breathing techniques  · Supine bilateral UE neural glides with FMP of wrist flexion/extension. · Supine cervical spine manual traction and suboccipital release  · Prone soft tissue mobilization of thoracic spine paraspinals and superficial fascia    [employer] Portal  Treatment/Session Summary:    · Response to Treatment:  improving overall mobility in cervical spine and decreased restrictions noted in anterior chest.  · Communication/Consultation:  None today  · Equipment provided today:  None today  · Recommendations/Intent for next treatment session: Next visit will focus on soft tissue mobilization to anterior chest/cervical spine and thoracic spine, postural awareness, breathing techniques.     Total Treatment Billable Duration:  55 minutes  PT Patient Time In/Time Out  Time In: 0930  Time Out: [address] [facility], PT    Future Appointments   Date Time Provider Jackie Mckinney   12/15/2021  9:30 AM Em Hopkins, PT [facility]   12/20/2021  4:30 PM Lamonte GARZA PT [facility]   12/22/2021  9:30 AM Em Hopkins, PT [facility]

## 2021-12-16 NOTE — PROGRESS NOTES
Rich Galloway  : 1965  Primary: [facility]  Secondary:  [facility] at [address], [address] [place] [address]  [phone]   [phone]      OUTPATIENT PHYSICAL THERAPY: Daily Treatment Note 12/15/2021  Visit Count:  5    ICD-10: Treatment Diagnosis: Cervicalgia (M54.2)   ICD-10: Treatment Diagnosis: Pain in thoracic spine (M54.6)     Precautions/Allergies:   Penicillin, Aciphex [rabeprazole], and Augmentin [amoxicillin-pot clavulanate]   TREATMENT PLAN:  Effective Dates: 2021 TO 3/1/2022 (90 days). Frequency/Duration: 2 times a week for 90 Day(s) MEDICAL/REFERRING DIAGNOSIS:  Numbness of left hand [R20.0]  Numbness of right hand [R20.0]   DATE OF ONSET: chronic, 1 year, progressing  REFERRING PHYSICIAN: Dr. Anderson President, MD [facility] Orders: evaluate and treat  Return MD Appointment: as needed     Pre-treatment Symptoms/Complaints:  Patient notes improving symptoms, did not wake up last night with any tingling in hands. Has also seen her myofascial massage therapist this week. Pain: Initial: Pain Intensity 1: 2  Pain Location 1: Spine, cervical  Pain Orientation 1: Left,Right /10 Post Session:  2/10   Medications Last Reviewed:  12/15/2021  Updated Objective Findings:  None Today  TREATMENT:     THERAPEUTIC EXERCISE: (15 minutes):  Exercises per grid below to improve mobility, strength, balance and coordination. Required moderate verbal, manual and tactile cues to promote proper body alignment, promote proper body posture, promote proper body mechanics and promote proper body breathing techniques. Progressed resistance, range, repetitions and complexity of movement as indicated.    Date:  12/15/2021     Activity/Exercise Parameters   Sleeping and Pillow review X 3 minute review, review foam vs father/down pillows   Quadriped arch/sag X 5 reps, 10 sec holds   Thread the needle X 5 reps, 10 sec holds   Supine pectoralis stretch X 5 reps, 15 sec holds                   MANUAL THERAPY: (40 minutes): Joint mobilization and Soft tissue mobilization was utilized and necessary because of the patient's restricted joint motion, painful spasm, loss of articular motion and restricted motion of soft tissue. (Used abbreviations: MET - muscle energy technique; PNF - proprioceptive neuromuscular facilitation; NMR - neuromuscular re-education; a/p - anterior to posterior; p/a - posterior to anterior, FMP - functional movement patterns, SF - Superficial Fascia; BC - Bony contours; MP - muscle play; IASTM - instrument-assisted soft tissue mobilization)  · Supine soft tissue mobilization of anterior chest with breathing techniques  · Supine bilateral UE neural glides with FMP of wrist flexion/extension. · Supine cervical spine manual traction and suboccipital release  · Prone soft tissue mobilization of thoracic spine paraspinals and superficial fascia    MedBridge Portal  Treatment/Session Summary:    · Response to Treatment:  improving mobility in cervical spine and upper thoracic spine, decreased soft tissue restrictions noted in anterior chest and periscapular region. · Communication/Consultation:  None today  · Equipment provided today:  None today  · Recommendations/Intent for next treatment session: Next visit will focus on soft tissue mobilization to anterior chest/cervical spine and thoracic spine, postural awareness, breathing techniques.     Total Treatment Billable Duration:  55 minutes  PT Patient Time In/Time Out  Time In: 0930  Time Out: [facility], PT    Future Appointments   Date Time Provider Jackie Mckinney   12/20/2021  4:30 PM Adolph Weston, PT [facility]   12/22/2021  9:30 AM Adolph Weston, PT [facility]

## 2021-12-20 ENCOUNTER — HOSPITAL ENCOUNTER (OUTPATIENT)
Dept: PHYSICAL THERAPY | Age: 56
Discharge: HOME | End: 2021-12-20
Payer: COMMERCIAL

## 2021-12-20 PROCEDURE — 97110 THERAPEUTIC EXERCISES: CPT

## 2021-12-20 PROCEDURE — 97140 MANUAL THERAPY 1/> REGIONS: CPT

## 2021-12-21 NOTE — PROGRESS NOTES
Maisha Kahn  : 1965  Primary: [facility]  Secondary:  Therapy Center at [address]  [phone]   [phone]      OUTPATIENT PHYSICAL THERAPY: Daily Treatment Note 2021  Visit Count:  6    ICD-10: Treatment Diagnosis: Cervicalgia (M54.2)   ICD-10: Treatment Diagnosis: Pain in thoracic spine (M54.6)     Precautions/Allergies:   Penicillin, Aciphex [rabeprazole], and Augmentin [amoxicillin-pot clavulanate]   TREATMENT PLAN:  Effective Dates: 2021 TO 3/1/2022 (90 days). Frequency/Duration: 2 times a week for 90 Day(s) MEDICAL/REFERRING DIAGNOSIS:  Numbness of left hand [R20.0]  Numbness of right hand [R20.0]   DATE OF ONSET: chronic, 1 year, progressing  REFERRING PHYSICIAN: Dr. Creed Cooks, MD MD Orders: evaluate and treat  Return MD Appointment: as needed     Pre-treatment Symptoms/Complaints:  Patient notes got her new sleep number bed over the weekend and ordered a new pillow. Notes improvements with sleep with her new mattress already. Only right hand went numb at night over the past 2 nights with less frequency. Pain: Initial: Pain Intensity 1: 2  Pain Location 1: Spine, cervical  Pain Orientation 1: Right /10 Post Session:  1/10   Medications Last Reviewed:  2021  Updated Objective Findings:  None Today  TREATMENT:     THERAPEUTIC EXERCISE: (15 minutes):  Exercises per grid below to improve mobility, strength, balance and coordination. Required moderate verbal, manual and tactile cues to promote proper body alignment, promote proper body posture, promote proper body mechanics and promote proper body breathing techniques. Progressed resistance, range, repetitions and complexity of movement as indicated.    Date:  2021     Activity/Exercise Parameters   Sleeping and Pillow review X 3 minute review, review foam vs father/down pillows   Quadriped arch/sag X 5 reps, 10 sec holds   Thread the needle X 5 reps, 10 sec holds   Supine pectoralis stretch X 5 reps, 15 sec holds   Diaphragmatic breathing X 5 minute education, x 5 reps               MANUAL THERAPY: (40 minutes): Joint mobilization and Soft tissue mobilization was utilized and necessary because of the patient's restricted joint motion, painful spasm, loss of articular motion and restricted motion of soft tissue. (Used abbreviations: MET - muscle energy technique; PNF - proprioceptive neuromuscular facilitation; NMR - neuromuscular re-education; a/p - anterior to posterior; p/a - posterior to anterior, FMP - functional movement patterns, SF - Superficial Fascia; BC - Bony contours; MP - muscle play; IASTM - instrument-assisted soft tissue mobilization)  · Supine soft tissue mobilization of anterior chest with breathing techniques  · Supine soft tissue mobilization to anterior cervical spine: SCM, scalenes with cervical spine rotation. · Supine bilateral UE neural glides with FMP of wrist flexion/extension. · Supine cervical spine manual traction and suboccipital release  · Prone soft tissue mobilization of thoracic spine paraspinals and superficial fascia    MedBridge Portal  Treatment/Session Summary:    · Response to Treatment:  decreased soft tissue restrictions noted in anterior chest and anterior cervical spine, improving bilateral UE neural glides. · Communication/Consultation:  None today  · Equipment provided today:  None today  · Recommendations/Intent for next treatment session: Next visit will focus on soft tissue mobilization to anterior chest/cervical spine and thoracic spine, postural awareness, breathing techniques.     Total Treatment Billable Duration:  55 minutes  PT Patient Time In/Time Out  Time In: 1640  Time Out: [address] [place] [address] Dr, PT    Future Appointments   Date Time Provider Jackie Mckinney   12/22/2021  9:30 AM Karlos Lewis., PT [facility]

## 2021-12-22 ENCOUNTER — HOSPITAL ENCOUNTER (OUTPATIENT)
Dept: PHYSICAL THERAPY | Age: 56
Discharge: HOME | End: 2021-12-22
Payer: COMMERCIAL

## 2021-12-22 PROCEDURE — 97140 MANUAL THERAPY 1/> REGIONS: CPT

## 2021-12-22 PROCEDURE — 97110 THERAPEUTIC EXERCISES: CPT

## 2021-12-23 NOTE — PROGRESS NOTES
Arleen Gamez  : 1965  Primary: Lindaann [facility]  Secondary:  [facility]rapy [facility] at [address], [address]  [phone]   [phone]      OUTPATIENT PHYSICAL THERAPY: Daily Treatment Note 2021  Visit Count:  7    ICD-10: Treatment Diagnosis: Cervicalgia (M54.2)   ICD-10: Treatment Diagnosis: Pain in thoracic spine (M54.6)     Precautions/Allergies:   Penicillin, Aciphex [rabeprazole], and Augmentin [amoxicillin-pot clavulanate]   TREATMENT PLAN:  Effective Dates: 2021 TO 3/1/2022 (90 days). Frequency/Duration: 2 times a week for 90 Day(s) MEDICAL/REFERRING DIAGNOSIS:  Numbness of left hand [R20.0]  Numbness of right hand [R20.0]   DATE OF ONSET: chronic, 1 year, progressing  REFERRING PHYSICIAN: Dr. Murry Spatz, MD MD Orders: evaluate and treat  Return MD Appointment: as needed     Pre-treatment Symptoms/Complaints:  Patient notes less discomfort noted in her neck and decreased tinging/numbness in bilateral UE. Pain: Initial: Pain Intensity 1: 2  Pain Location 1: Spine, cervical  Pain Orientation 1: Right /10 Post Session:  1/10   Medications Last Reviewed:  2021  Updated Objective Findings:  None Today  TREATMENT:     THERAPEUTIC EXERCISE: (15 minutes):  Exercises per grid below to improve mobility, strength, balance and coordination. Required moderate verbal, manual and tactile cues to promote proper body alignment, promote proper body posture, promote proper body mechanics and promote proper body breathing techniques. Progressed resistance, range, repetitions and complexity of movement as indicated.    Date:  2021     Activity/Exercise Parameters   Sleeping and Pillow review X 3 minute review positions    Quadriped arch/sag X 5 reps, 10 sec holds   Thread the needle X 5 reps, 10 sec holds   Supine pectoralis stretch X 5 reps, 15 sec holds   Diaphragmatic breathing  x 5 reps   Pectoralis stretch on foam roller X 3 minute education   Standing pectoralis stretch X 5 reps, 15 sec holds       MANUAL THERAPY: (40 minutes): Joint mobilization and Soft tissue mobilization was utilized and necessary because of the patient's restricted joint motion, painful spasm, loss of articular motion and restricted motion of soft tissue. (Used abbreviations: MET - muscle energy technique; PNF - proprioceptive neuromuscular facilitation; NMR - neuromuscular re-education; a/p - anterior to posterior; p/a - posterior to anterior, FMP - functional movement patterns, SF - Superficial Fascia; BC - Bony contours; MP - muscle play; IASTM - instrument-assisted soft tissue mobilization)  · Supine soft tissue mobilization of anterior chest with breathing techniques  · Supine soft tissue mobilization to anterior cervical spine: SCM, scalenes with cervical spine rotation. · Supine bilateral UE neural glides with FMP of wrist flexion/extension. · Supine cervical spine manual traction and suboccipital release  · Prone soft tissue mobilization of thoracic spine paraspinals and superficial fascia    MedBridge Portal  Treatment/Session Summary:    · Response to Treatment:  improving overall mobility in bilateral UE and anterior chest. decrease soft tissue restrictions noted in periscapular and thoracic spine. · Communication/Consultation:  None today  · Equipment provided today:  None today  · Recommendations/Intent for next treatment session: Next visit will focus on soft tissue mobilization to anterior chest/cervical spine and thoracic spine, postural awareness, breathing techniques. Total Treatment Billable Duration:  55 minutes  PT Patient Time In/Time Out  Time In: 0930  Time Out: MARYAM Rao    No future appointments.

## 2022-01-07 ENCOUNTER — APPOINTMENT (RX ONLY)
Dept: URBAN - METROPOLITAN AREA CLINIC 349 | Facility: CLINIC | Age: 57
Setting detail: DERMATOLOGY
End: 2022-01-07

## 2022-01-07 DIAGNOSIS — L20.89 OTHER ATOPIC DERMATITIS: ICD-10-CM | Status: WELL CONTROLLED

## 2022-01-07 DIAGNOSIS — D22 MELANOCYTIC NEVI: ICD-10-CM | Status: STABLE

## 2022-01-07 DIAGNOSIS — Z87.2 PERSONAL HISTORY OF DISEASES OF THE SKIN AND SUBCUTANEOUS TISSUE: ICD-10-CM

## 2022-01-07 PROBLEM — D22.61 MELANOCYTIC NEVI OF RIGHT UPPER LIMB, INCLUDING SHOULDER: Status: ACTIVE | Noted: 2022-01-07

## 2022-01-07 PROBLEM — L20.84 INTRINSIC (ALLERGIC) ECZEMA: Status: ACTIVE | Noted: 2022-01-07

## 2022-01-07 PROBLEM — D22.62 MELANOCYTIC NEVI OF LEFT UPPER LIMB, INCLUDING SHOULDER: Status: ACTIVE | Noted: 2022-01-07

## 2022-01-07 PROBLEM — D22.5 MELANOCYTIC NEVI OF TRUNK: Status: ACTIVE | Noted: 2022-01-07

## 2022-01-07 PROCEDURE — ? COUNSELING

## 2022-01-07 PROCEDURE — 99213 OFFICE O/P EST LOW 20 MIN: CPT

## 2022-01-07 PROCEDURE — ? PRESCRIPTION

## 2022-01-07 PROCEDURE — ? MEDICAL PHOTOGRAPHY REVIEW

## 2022-01-07 PROCEDURE — ? BODY PHOTOGRAPHY

## 2022-01-07 RX ORDER — CLOBETASOL PROPIONATE 0.5 MG/G
CREAM TOPICAL
Qty: 60 | Refills: 2 | Status: ERX | COMMUNITY
Start: 2022-01-07

## 2022-01-07 RX ADMIN — CLOBETASOL PROPIONATE: 0.5 CREAM TOPICAL at 00:00

## 2022-01-07 ASSESSMENT — LOCATION SIMPLE DESCRIPTION DERM
LOCATION SIMPLE: ABDOMEN
LOCATION SIMPLE: RIGHT PRETIBIAL REGION
LOCATION SIMPLE: RIGHT LOWER BACK
LOCATION SIMPLE: LEFT THIGH
LOCATION SIMPLE: RIGHT THIGH
LOCATION SIMPLE: LEFT UPPER ARM
LOCATION SIMPLE: RIGHT UPPER ARM
LOCATION SIMPLE: RIGHT UPPER BACK
LOCATION SIMPLE: CHEST

## 2022-01-07 ASSESSMENT — LOCATION ZONE DERM
LOCATION ZONE: TRUNK
LOCATION ZONE: ARM
LOCATION ZONE: LEG

## 2022-01-07 ASSESSMENT — LOCATION DETAILED DESCRIPTION DERM
LOCATION DETAILED: MIDDLE STERNUM
LOCATION DETAILED: RIGHT ANTERIOR DISTAL UPPER ARM
LOCATION DETAILED: RIGHT INFERIOR MEDIAL UPPER BACK
LOCATION DETAILED: RIGHT LATERAL ABDOMEN
LOCATION DETAILED: RIGHT ANTERIOR PROXIMAL THIGH
LOCATION DETAILED: LEFT ANTERIOR DISTAL UPPER ARM
LOCATION DETAILED: RIGHT DISTAL PRETIBIAL REGION
LOCATION DETAILED: RIGHT INFERIOR MEDIAL MIDBACK
LOCATION DETAILED: LEFT ANTERIOR PROXIMAL THIGH

## 2022-01-07 ASSESSMENT — SEVERITY ASSESSMENT 2020: SEVERITY 2020: CLEAR

## 2022-01-07 NOTE — PROCEDURE: BODY PHOTOGRAPHY
Number Of Photographs (Optional- Will Not Render If 0): 3
Whole Body Statement: The whole body was photographed today.
Was The Entire Body Photographed (Cannot Bill Unless Entire Body Photographed)?: Please Select Yes or No - If you select Yes you are confirming that you took full body photographs
Reason For Photography: The patient is obtaining body photography to observe existing suspicious moles and or monitor for the appearance of any new lesions.
Consent: Written consent obtained, risks reviewed for whole body photography. Patient understands that photograph costs may not be covered by insurance, and patient is ultimately responsible for payment.
Detail Level: Simple

## 2022-03-18 PROBLEM — R00.2 PALPITATIONS: Status: ACTIVE | Noted: 2017-02-08

## 2022-03-18 PROBLEM — E66.01 SEVERE OBESITY WITH BODY MASS INDEX (BMI) OF 35.0 TO 39.9 WITH SERIOUS COMORBIDITY (HCC): Status: ACTIVE | Noted: 2018-09-24

## 2022-03-18 PROBLEM — G47.25: Status: ACTIVE | Noted: 2018-09-24

## 2022-03-19 PROBLEM — G89.29 CHRONIC LOW BACK PAIN: Status: ACTIVE | Noted: 2017-11-06

## 2022-03-19 PROBLEM — M54.50 CHRONIC LOW BACK PAIN: Status: ACTIVE | Noted: 2017-11-06

## 2022-04-18 NOTE — THERAPY DISCHARGE
Jessica Lafleur  : 1965  Primary: [facility]  Secondary:  [facility]rapy Center at [place] [address], [address] [facility]  [phone]   [phone]        OUTPATIENT PHYSICAL THERAPY:Discontinuation Summary 2022     ICD-10: Treatment Diagnosis: Cervicalgia (M54.2)   ICD-10: Treatment Diagnosis: Pain in thoracic spine (M54.6)    Precautions/Allergies:   Penicillin, Aciphex [rabeprazole], and Augmentin [amoxicillin-pot clavulanate]   TREATMENT PLAN:  Effective Dates: 2021 TO 3/1/2022 (90 days). Frequency/Duration: 2 times a week for 90 Day(s) MEDICAL/REFERRING DIAGNOSIS:  Numbness of left hand [R20.0]  Numbness of right hand [R20.0]   DATE OF ONSET: chronic, 1 year, progressing  REFERRING PHYSICIAN: Dr. Creed Cooks, MD MD Orders: evaluate and treat  Return MD Appointment: as needed     DISCONTINUATION NOTE: As of 2021, Maisha Kahn has attended 7 out of 7 scheduled visits, with 0 cancellation(s) and 0 no shows. She has met some of her physical therapy goals. She has decided to continue to work independently on her HEP. She did not need any additional physical therapy visits at this time and will be discontinued from therapy service. INITIAL ASSESSMENT:  Ms. Johnny Piper is a 55 y.o. female who presents to physical therapy with insidious onset of bilateral hand numbness every night when she sleeps. She presents with cervical and thoracic spine dysfunctions, bilateral UE neural tension and significant postural deficits. She is challenged with her sleep positions and her sleep quality. She would benefit from skilled physical therapy to improve mobility of her cervical and thoracic spine, improve postural awareness, improve awareness of sleeping positions and habits and improve postural stability and core stability strength.            GOALS: (Goals have been discussed and agreed upon with patient.)    Discharge Goals: Time Frame: 90 days  1. Patient demonstrated independence with her HEP without verbal cueing from therapist. GOAL MET  2. Patient able to sleep for 6 consecutive hours with correct sleeping positions and pillow supports. ALMOST MET  3. Patient able to stand for 30 minutes to perform household activities with correct posture and automatic core engagement. ALMOST MET  4. Improve DASH outcome measure score from 17/55 to 13/55 to perform ADLs. NOT ASSESSED    OUTCOME MEASURE:   Tool Used: Disabilities of the Arm, Shoulder and Hand (DASH) Questionnaire - Quick Version  Score:  Initial: 17/55  Most Recent: X/55 (Date: -- )   Interpretation of Score: The DASH is designed to measure the activities of daily living in person's with upper extremity dysfunction or pain. Each section is scored on a 1-5 scale, 5 representing the greatest disability. The scores of each section are added together for a total score of 55. Thank you for this referral,  Beti Bhakta PT     Referring Physician Signature: Dr. Tena Joyner MD No Signature is Required for this note.

## 2022-09-07 ENCOUNTER — HOSPITAL ENCOUNTER (OUTPATIENT)
Dept: MAMMOGRAPHY | Age: 57
Discharge: HOME | End: 2022-09-10

## 2022-09-07 DIAGNOSIS — Z12.31 VISIT FOR SCREENING MAMMOGRAM: ICD-10-CM

## 2023-04-04 ENCOUNTER — APPOINTMENT (RX ONLY)
Dept: URBAN - METROPOLITAN AREA CLINIC 330 | Facility: CLINIC | Age: 58
Setting detail: DERMATOLOGY
End: 2023-04-04

## 2023-04-04 DIAGNOSIS — D22 MELANOCYTIC NEVI: ICD-10-CM | Status: STABLE

## 2023-04-04 DIAGNOSIS — Z87.2 PERSONAL HISTORY OF DISEASES OF THE SKIN AND SUBCUTANEOUS TISSUE: ICD-10-CM

## 2023-04-04 DIAGNOSIS — L20.89 OTHER ATOPIC DERMATITIS: ICD-10-CM | Status: WELL CONTROLLED

## 2023-04-04 DIAGNOSIS — L82.1 OTHER SEBORRHEIC KERATOSIS: ICD-10-CM

## 2023-04-04 PROBLEM — L20.84 INTRINSIC (ALLERGIC) ECZEMA: Status: ACTIVE | Noted: 2023-04-04

## 2023-04-04 PROBLEM — D22.5 MELANOCYTIC NEVI OF TRUNK: Status: ACTIVE | Noted: 2023-04-04

## 2023-04-04 PROCEDURE — ? FULL BODY SKIN EXAM

## 2023-04-04 PROCEDURE — ? OTHER

## 2023-04-04 PROCEDURE — ? COUNSELING

## 2023-04-04 PROCEDURE — 99213 OFFICE O/P EST LOW 20 MIN: CPT

## 2023-04-04 PROCEDURE — ? MDM - TREATMENT GOALS

## 2023-04-04 PROCEDURE — ? MEDICAL PHOTOGRAPHY REVIEW

## 2023-04-04 PROCEDURE — ? PRESCRIPTION MEDICATION MANAGEMENT

## 2023-04-04 ASSESSMENT — LOCATION SIMPLE DESCRIPTION DERM
LOCATION SIMPLE: RIGHT THIGH
LOCATION SIMPLE: ABDOMEN
LOCATION SIMPLE: RIGHT FOOT
LOCATION SIMPLE: RIGHT UPPER BACK
LOCATION SIMPLE: LEFT THIGH
LOCATION SIMPLE: RIGHT PRETIBIAL REGION
LOCATION SIMPLE: RIGHT LOWER BACK

## 2023-04-04 ASSESSMENT — LOCATION DETAILED DESCRIPTION DERM
LOCATION DETAILED: RIGHT LATERAL ABDOMEN
LOCATION DETAILED: RIGHT MEDIAL UPPER BACK
LOCATION DETAILED: LEFT ANTERIOR PROXIMAL THIGH
LOCATION DETAILED: RIGHT DORSAL FOOT
LOCATION DETAILED: RIGHT DISTAL PRETIBIAL REGION
LOCATION DETAILED: RIGHT INFERIOR MEDIAL MIDBACK
LOCATION DETAILED: RIGHT LATERAL DISTAL PRETIBIAL REGION
LOCATION DETAILED: RIGHT ANTERIOR PROXIMAL THIGH
LOCATION DETAILED: RIGHT INFERIOR MEDIAL UPPER BACK

## 2023-04-04 ASSESSMENT — LOCATION ZONE DERM
LOCATION ZONE: FEET
LOCATION ZONE: TRUNK
LOCATION ZONE: LEG

## 2023-04-04 ASSESSMENT — ITCH NUMERIC RATING SCALE: HOW SEVERE IS YOUR ITCHING?: 0

## 2023-04-04 ASSESSMENT — SEVERITY ASSESSMENT 2020: SEVERITY 2020: CLEAR

## 2023-05-09 ENCOUNTER — HOSPITAL ENCOUNTER (OUTPATIENT)
Dept: NUTRITION | Age: 58
Discharge: HOME | End: 2023-05-12
Payer: COMMERCIAL

## 2023-05-09 PROCEDURE — 97802 MEDICAL NUTRITION INDIV IN: CPT

## 2023-05-09 NOTE — PROGRESS NOTES
Alaina Lawrence, MS RD LD, [facility]  Outpatient Registered Dietitian  [facility]  Phone: [phone]  Fax: [phone]    ASSESSMENT  Pt is a 65 y.o. female referred with the following diagnosis (es): Body mass index (BMI) 36.0-36.9, adult [Z68.36]  Dietary counseling and surveillance [Z71.3]   PMH includes elevated TG. Patient stated goal:   Weight reduction. Guidance on planted based eating. Notes following low fat/plant based meal pattern for a few months, purchased Blackwave. Dislikes cooking. Hx of Weight cycling, received nutrition services/food allergy testing from a chiropractor. shares family are in bigger body sizes. Eating behaviors and factors impacting food choices:   -Established food preferences/eating behaviors  -Confusion on nutrition advice: unsure if best pattern is plant based, low in fat. -Dining out more than 50% of meals-- preparing more meals at home now. Will be traveling for 1.5 weeks. Initial Diet Recall:   B: raspberries, sourdough toast butter  L: 1-2 cups black/cannon bean salad, tortilla chips  D: pasta marcel, with salad, Daykin sprouts. Drinks soda 1x per week, otherwise drinks water, alcohol 1x per month   Eating pattern appears inadequate in energy and protein. Labs: reviewed  Meds: reviewed. Patient Active Problem List   Diagnosis    Severe obesity with body mass index (BMI) of 35.0 to 39.9 with serious comorbidity (HCC)    Circadian rhythm sleep disorder, jet lag type    Esophageal reflux    Palpitations    Chronic low back pain    Class 2 obesity due to excess calories in adult    Extrinsic asthma    Essential hypertension, benign    RAQUEL on CPAP    Generalized anxiety disorder     Lifestyle/Family Influence/Support:   , retired, lives with spouse. Movement includes 20 minutes HIIT 3x per week  Stage of Change:  action     Anthropometrics:   Ht: 167.64cm, Wt: 102.15kg;  BMI 36.3 /60 (4/10/23)

## 2023-06-26 ENCOUNTER — HOSPITAL ENCOUNTER (OUTPATIENT)
Dept: NUTRITION | Age: 58
Setting detail: RECURRING SERIES
Discharge: HOME | End: 2023-06-29
Payer: COMMERCIAL

## 2023-06-26 PROCEDURE — 97803 MED NUTRITION INDIV SUBSEQ: CPT

## 2023-09-18 ENCOUNTER — HOSPITAL ENCOUNTER (OUTPATIENT)
Dept: NUTRITION | Age: 58
Setting detail: RECURRING SERIES
Discharge: HOME | End: 2023-09-21
Payer: COMMERCIAL

## 2023-09-18 ENCOUNTER — HOSPITAL ENCOUNTER (OUTPATIENT)
Dept: MAMMOGRAPHY | Age: 58
Discharge: HOME | End: 2023-09-21

## 2023-09-18 DIAGNOSIS — Z12.31 SCREENING MAMMOGRAM FOR HIGH-RISK PATIENT: ICD-10-CM

## 2023-09-18 PROCEDURE — 97803 MED NUTRITION INDIV SUBSEQ: CPT

## 2023-09-18 NOTE — PROGRESS NOTES
Anna Tellez, MS RD LD, [facility]  Outpatient Registered Dietitian  [address] [facility]  Phone: [phone]  Fax: [phone]       Pt is a 65 y.o. female referred with the following diagnosis (es): Body mass index (BMI) 36.0-36.9, adult [Z68.36]  Dietary counseling and surveillance [Z71.3]   PMH includes elevated TG.       9/18/2023 Follow up assessment   Jennifer Nj comes in today for a follow up assessment. Has a mamogram scheduled today. Since last assessment she traveled for 2.5 weeks, visiting her mother then went to [place]. Gained 6 pounds in 1.5 weeks. Reflects on 80year old mother consuming cookies and ice cream for lunch. Experienced significant constipation, no BM for 9 days while on vacation, landing her in the ER. Since getting back from vacation she is ready to get back on track today. Unclear as to why she is not tracking meals, but states she is ready to this time. Has not started on the D3/O3 supplement. Not following through with goals to support her WL goals today. B: oatmeal with 2 chocolate chip cookies. Intervention: Planned out balanced breakfast, reemphasized protein food at each meal.  Provided protein recommendations along with handout on plant and animal protein foods. Emphasized aiming for 15- 20g protein at each meal.      Goal: Support plant based meal pattern and weight management goals-- not progressing. Action Steps:           1. Consume 64 ounces of water daily  No meeting goals, extend, stressed to support in weight reduction   2. Consume 8-10 grams for fiber per meal  Not meeting goals   3.  Consume 20-25 grams protein per meal  Not meeting goals -- stressed to aim to consume 15g at minimum  States she will drink her protein shake in the morning, and add nuts/ flax/luis to her oatmeal. Upcoming dental visit will place her on a soft diet-- we planned out meals for a soft die today.     ---Track meals snack and beverages using [employer] adelaide for

## 2024-01-09 ENCOUNTER — APPOINTMENT (RX ONLY)
Dept: URBAN - METROPOLITAN AREA CLINIC 330 | Facility: CLINIC | Age: 59
Setting detail: DERMATOLOGY
End: 2024-01-09

## 2024-01-09 DIAGNOSIS — D22 MELANOCYTIC NEVI: ICD-10-CM | Status: STABLE

## 2024-01-09 DIAGNOSIS — L82.1 OTHER SEBORRHEIC KERATOSIS: ICD-10-CM

## 2024-01-09 DIAGNOSIS — Z87.2 PERSONAL HISTORY OF DISEASES OF THE SKIN AND SUBCUTANEOUS TISSUE: ICD-10-CM

## 2024-01-09 DIAGNOSIS — L20.89 OTHER ATOPIC DERMATITIS: ICD-10-CM | Status: WELL CONTROLLED

## 2024-01-09 PROBLEM — D22.5 MELANOCYTIC NEVI OF TRUNK: Status: ACTIVE | Noted: 2024-01-09

## 2024-01-09 PROCEDURE — ? TREATMENT REGIMEN

## 2024-01-09 PROCEDURE — ? MEDICAL PHOTOGRAPHY REVIEW

## 2024-01-09 PROCEDURE — ? PRESCRIPTION MEDICATION MANAGEMENT

## 2024-01-09 PROCEDURE — ? OTHER

## 2024-01-09 PROCEDURE — ? MDM - TREATMENT GOALS

## 2024-01-09 PROCEDURE — 99213 OFFICE O/P EST LOW 20 MIN: CPT

## 2024-01-09 PROCEDURE — ? FULL BODY SKIN EXAM

## 2024-01-09 PROCEDURE — ? COUNSELING

## 2024-01-09 ASSESSMENT — LOCATION SIMPLE DESCRIPTION DERM
LOCATION SIMPLE: RIGHT THIGH
LOCATION SIMPLE: ABDOMEN
LOCATION SIMPLE: RIGHT PRETIBIAL REGION
LOCATION SIMPLE: RIGHT LOWER BACK
LOCATION SIMPLE: RIGHT UPPER BACK
LOCATION SIMPLE: LEFT THIGH

## 2024-01-09 ASSESSMENT — LOCATION DETAILED DESCRIPTION DERM
LOCATION DETAILED: RIGHT DISTAL PRETIBIAL REGION
LOCATION DETAILED: RIGHT INFERIOR MEDIAL MIDBACK
LOCATION DETAILED: RIGHT ANTERIOR LATERAL PROXIMAL THIGH
LOCATION DETAILED: LEFT ANTERIOR PROXIMAL THIGH
LOCATION DETAILED: RIGHT ANTERIOR PROXIMAL THIGH
LOCATION DETAILED: RIGHT INFERIOR MEDIAL UPPER BACK
LOCATION DETAILED: RIGHT LATERAL ABDOMEN

## 2024-01-09 ASSESSMENT — SEVERITY ASSESSMENT 2020: SEVERITY 2020: CLEAR

## 2024-01-09 ASSESSMENT — LOCATION ZONE DERM
LOCATION ZONE: TRUNK
LOCATION ZONE: LEG

## 2024-01-09 ASSESSMENT — ITCH NUMERIC RATING SCALE: HOW SEVERE IS YOUR ITCHING?: 0

## 2024-01-09 NOTE — PROCEDURE: MIPS QUALITY
Detail Level: Detailed
Quality 226: Preventive Care And Screening: Tobacco Use: Screening And Cessation Intervention: Patient screened for tobacco use and is an ex/non-smoker
Quality 402: Tobacco Use And Help With Quitting Among Adolescents: Patient screened for tobacco and never smoked
Quality 431: Preventive Care And Screening: Unhealthy Alcohol Use - Screening: Patient not identified as an unhealthy alcohol user when screened for unhealthy alcohol use using a systematic screening method
Quality 130: Documentation Of Current Medications In The Medical Record: Current Medications Documented
Quality 486: Dermatitis - Improvement In Patient-Reported Itch Severity: Itch severity assessment score is reduced by 2 or more points from the initial (index) assessment score to the follow-up visit score

## 2024-01-09 NOTE — PROCEDURE: PRESCRIPTION MEDICATION MANAGEMENT
Detail Level: Zone
Continue Regimen: clobetasol 0.05 % topical cream \\nApply to rash twice daily x 2-3 weeks\\n\\nPatient declines needing refills at this time. \\nDiscussed topical steroid risk and appropriate use.
Render In Strict Bullet Format?: No

## 2024-02-23 NOTE — PROGRESS NOTES
Patient phone: [phone] (home)       Primary Insurance: Payor: [employer] / Plan: [employer] [place] / Product Type: *No Product type* /   Subscriber ID: [id number] - ([employer]manda [employer])      AMB Supply Order  Order Details     DME Location: Patient preference   Order Date: 2/26/2024   The encounter diagnosis was RAQUEL (obstructive sleep apnea).             (  X   )Supplies Needed       CPAP Machine   (     ) CPAP Unit  ( x    ) Auto CPAP Unit  (     ) BiLevel Unit  (     ) Auto BiLevel Unit  (     ) ASV        (     ) Bilevel ST      Length of need: 12 months    Pressure:  8-12 cmH20  EPR: 3    Starting Ramp Pressure:   cm H20  Ramp Time: min        Patient had a diagnostic Apnea Hypopnea Index (AHI) of :  8.7  *SUPPLIES* Replace all as needed, or per coverage guidelines     Masks Type:  ( x   ) -Full Face Mask (1 per 3 mon)  ( x   ) -Full Mask (1 per month) Interface/Cushion      ( x ) -Nasal Mask (1 per 3 mon)  ( x  ) - Nasal Mask (1 per month) Interface/Cushion  (  x   ) -Pillow (2 per mon)  (  x   ) -Fnkrucupm (1 per 6 mon)            Other Supplies:    (   X  )-Tmfstqop (1 per 6 mon)  (   X  )-Oarrhr Tubing (1 per 3 mon)  (   X  )- Disposable Filter (2 per mon)  (   x  )-Hmnptl Humidifier (1 per year)     (  x   )-Afplhuqws (sometimes used with Full Face Mask) (1 per 6 mos)  (    )-Tubing-without heat (1 per 3 mos)  (     )-Non-Disposable Filter (1 per 6 mos)  (  x   )-Water Chamber (1 per 6 mos)  (     )-Humidifier non-heated (1 per 5 yrs)      Signed Date: 2/26/2024  Electronically Signed By: DIAMOND Maier - CNP  Electronically Dated:  2/26/2024              Collaborating Physician: Dr. Fatima     Today's office visit was spent  reviewing test results, prognosis, importance of compliance, education about disease process, benefits of medications, instructions for management of acute flare-ups, and follow up plans.

## 2024-02-26 ENCOUNTER — OFFICE VISIT (OUTPATIENT)
Dept: SLEEP MEDICINE | Age: 59
End: 2024-02-26
Payer: COMMERCIAL

## 2024-02-26 VITALS
TEMPERATURE: 97 F | HEIGHT: 60 IN | SYSTOLIC BLOOD PRESSURE: 127 MMHG | DIASTOLIC BLOOD PRESSURE: 80 MMHG | OXYGEN SATURATION: 98 % | HEART RATE: 59 BPM | WEIGHT: 209 LBS | BODY MASS INDEX: 41.03 KG/M2

## 2024-02-26 DIAGNOSIS — E66.01 CLASS 3 SEVERE OBESITY DUE TO EXCESS CALORIES WITHOUT SERIOUS COMORBIDITY WITH BODY MASS INDEX (BMI) OF 40.0 TO 44.9 IN ADULT (HCC): ICD-10-CM

## 2024-02-26 DIAGNOSIS — R06.83 SNORING: ICD-10-CM

## 2024-02-26 DIAGNOSIS — R61 NIGHT SWEATS: ICD-10-CM

## 2024-02-26 DIAGNOSIS — G47.34 NOCTURNAL HYPOXEMIA: ICD-10-CM

## 2024-02-26 DIAGNOSIS — G47.33 OSA (OBSTRUCTIVE SLEEP APNEA): Primary | ICD-10-CM

## 2024-02-26 DIAGNOSIS — G47.8 NON-RESTORATIVE SLEEP: ICD-10-CM

## 2024-02-26 DIAGNOSIS — G47.10 HYPERSOMNOLENCE: ICD-10-CM

## 2024-02-26 PROBLEM — E66.813 CLASS 3 SEVERE OBESITY DUE TO EXCESS CALORIES WITHOUT SERIOUS COMORBIDITY WITH BODY MASS INDEX (BMI) OF 40.0 TO 44.9 IN ADULT (HCC): Status: ACTIVE | Noted: 2018-09-24

## 2024-02-26 PROCEDURE — 3079F DIAST BP 80-89 MM HG: CPT | Performed by: NURSE PRACTITIONER

## 2024-02-26 PROCEDURE — 3074F SYST BP LT 130 MM HG: CPT | Performed by: NURSE PRACTITIONER

## 2024-02-26 PROCEDURE — 99203 OFFICE O/P NEW LOW 30 MIN: CPT | Performed by: NURSE PRACTITIONER

## 2024-02-26 ASSESSMENT — SLEEP AND FATIGUE QUESTIONNAIRES
ESS TOTAL SCORE: 13
HOW LIKELY ARE YOU TO NOD OFF OR FALL ASLEEP WHEN YOU ARE A PASSENGER IN A CAR FOR AN HOUR WITHOUT A BREAK: 3
HOW LIKELY ARE YOU TO NOD OFF OR FALL ASLEEP WHILE LYING DOWN TO REST IN THE AFTERNOON WHEN CIRCUMSTANCES PERMIT: 3
HOW LIKELY ARE YOU TO NOD OFF OR FALL ASLEEP IN A CAR, WHILE STOPPED FOR A FEW MINUTES IN TRAFFIC: 0
HOW LIKELY ARE YOU TO NOD OFF OR FALL ASLEEP WHILE SITTING AND TALKING TO SOMEONE: 1
HOW LIKELY ARE YOU TO NOD OFF OR FALL ASLEEP WHILE SITTING AND READING: 3
HOW LIKELY ARE YOU TO NOD OFF OR FALL ASLEEP WHILE SITTING QUIETLY AFTER LUNCH WITHOUT ALCOHOL: 1
HOW LIKELY ARE YOU TO NOD OFF OR FALL ASLEEP WHILE SITTING INACTIVE IN A PUBLIC PLACE: 1
HOW LIKELY ARE YOU TO NOD OFF OR FALL ASLEEP WHILE WATCHING TV: 1

## 2024-02-26 NOTE — PATIENT INSTRUCTIONS
Continue CPAP 8-12 cm H2O with nightly compliance  New CPAP supplies ordered and prescription given to patient she orders her supplies online  Recommendations as above  Follow-up in 1 year or sooner if needed

## 2024-08-26 ENCOUNTER — TRANSCRIBE ORDERS (OUTPATIENT)
Dept: SCHEDULING | Age: 59
End: 2024-08-26

## 2024-08-26 DIAGNOSIS — Z12.31 SCREENING MAMMOGRAM FOR HIGH-RISK PATIENT: Primary | ICD-10-CM

## 2025-01-09 ENCOUNTER — APPOINTMENT (OUTPATIENT)
Dept: URBAN - METROPOLITAN AREA CLINIC 330 | Facility: CLINIC | Age: 60
Setting detail: DERMATOLOGY
End: 2025-01-09

## 2025-01-09 DIAGNOSIS — D22 MELANOCYTIC NEVI: ICD-10-CM | Status: STABLE

## 2025-01-09 DIAGNOSIS — L20.89 OTHER ATOPIC DERMATITIS: ICD-10-CM | Status: WELL CONTROLLED

## 2025-01-09 DIAGNOSIS — Z87.2 PERSONAL HISTORY OF DISEASES OF THE SKIN AND SUBCUTANEOUS TISSUE: ICD-10-CM

## 2025-01-09 DIAGNOSIS — L82.0 INFLAMED SEBORRHEIC KERATOSIS: ICD-10-CM

## 2025-01-09 PROBLEM — D22.5 MELANOCYTIC NEVI OF TRUNK: Status: ACTIVE | Noted: 2025-01-09

## 2025-01-09 PROCEDURE — ? MEDICAL PHOTOGRAPHY REVIEW

## 2025-01-09 PROCEDURE — ? COUNSELING

## 2025-01-09 PROCEDURE — ? OTHER

## 2025-01-09 PROCEDURE — ? FULL BODY SKIN EXAM

## 2025-01-09 PROCEDURE — ? PRESCRIPTION MEDICATION MANAGEMENT

## 2025-01-09 PROCEDURE — 17110 DESTRUCTION B9 LES UP TO 14: CPT

## 2025-01-09 PROCEDURE — 99213 OFFICE O/P EST LOW 20 MIN: CPT | Mod: 25

## 2025-01-09 PROCEDURE — ? MDM - TREATMENT GOALS

## 2025-01-09 PROCEDURE — ? LIQUID NITROGEN

## 2025-01-09 ASSESSMENT — LOCATION DETAILED DESCRIPTION DERM
LOCATION DETAILED: RIGHT DISTAL PRETIBIAL REGION
LOCATION DETAILED: LEFT ANTERIOR PROXIMAL THIGH
LOCATION DETAILED: RIGHT LATERAL ABDOMEN
LOCATION DETAILED: RIGHT INFERIOR MEDIAL UPPER BACK
LOCATION DETAILED: RIGHT INFERIOR MEDIAL MIDBACK
LOCATION DETAILED: RIGHT ANTERIOR LATERAL DISTAL THIGH
LOCATION DETAILED: RIGHT ANTERIOR PROXIMAL THIGH

## 2025-01-09 ASSESSMENT — LOCATION ZONE DERM
LOCATION ZONE: LEG
LOCATION ZONE: TRUNK

## 2025-01-09 ASSESSMENT — LOCATION SIMPLE DESCRIPTION DERM
LOCATION SIMPLE: RIGHT UPPER BACK
LOCATION SIMPLE: RIGHT LOWER BACK
LOCATION SIMPLE: ABDOMEN
LOCATION SIMPLE: RIGHT THIGH
LOCATION SIMPLE: RIGHT PRETIBIAL REGION
LOCATION SIMPLE: LEFT THIGH

## 2025-01-09 NOTE — PROCEDURE: LIQUID NITROGEN
Render Note In Bullet Format When Appropriate: No
Medical Necessity Information: It is in your best interest to select a reason for this procedure from the list below. All of these items fulfill various CMS LCD requirements except the new and changing color options.
Consent: The patient's consent was obtained including but not limited to risks of crusting, scabbing, blistering, scarring, darker or lighter pigmentary change, recurrence, incomplete removal and infection.
Post-Care Instructions: I reviewed with the patient in detail post-care instructions. Patient is to wear sunprotection, and avoid picking at any of the treated lesions. Pt may apply Vaseline to crusted or scabbing areas.
Spray Paint Text: The liquid nitrogen was applied to the skin utilizing a spray paint frosting technique.
Aperture Size (Optional): C
Show Topical Anesthesia Variable?: Yes
Application Tool (Optional): Liquid Nitrogen Sprayer
Detail Level: Detailed
Duration Of Freeze Thaw-Cycle (Seconds): 5-10
Number Of Freeze-Thaw Cycles: 2 freeze-thaw cycles
Medical Necessity Clause: This procedure was medically necessary because the lesions that were treated were:

## 2025-06-10 ENCOUNTER — TRANSCRIBE ORDERS (OUTPATIENT)
Dept: SCHEDULING | Age: 60
End: 2025-06-10

## 2025-06-10 DIAGNOSIS — Z12.31 ENCOUNTER FOR SCREENING MAMMOGRAM FOR MALIGNANT NEOPLASM OF BREAST: Primary | ICD-10-CM

## 2025-06-26 ENCOUNTER — RX ONLY (RX ONLY)
Age: 60
End: 2025-06-26

## 2025-06-26 RX ORDER — CLOBETASOL PROPIONATE CREAM USP, 0.05% 0.5 MG/G
CREAM TOPICAL
Qty: 60 | Refills: 2 | Status: ERX | COMMUNITY
Start: 2025-06-26

## 2025-06-26 RX ORDER — CLOBETASOL PROPIONATE CREAM USP, 0.05% 0.5 MG/G
CREAM TOPICAL
Qty: 60 | Refills: 2 | Status: CANCELLED | COMMUNITY
Start: 2025-06-26